# Patient Record
Sex: MALE | Race: BLACK OR AFRICAN AMERICAN | NOT HISPANIC OR LATINO | URBAN - METROPOLITAN AREA
[De-identification: names, ages, dates, MRNs, and addresses within clinical notes are randomized per-mention and may not be internally consistent; named-entity substitution may affect disease eponyms.]

---

## 2021-05-20 ENCOUNTER — INPATIENT (INPATIENT)
Facility: HOSPITAL | Age: 76
LOS: 15 days | Discharge: NURSING, REHAB OR LONG-TERM CARE | DRG: 656 | End: 2021-06-05
Attending: GENERAL ACUTE CARE HOSPITAL | Admitting: GENERAL ACUTE CARE HOSPITAL
Payer: MEDICARE

## 2021-05-20 VITALS
TEMPERATURE: 98 F | RESPIRATION RATE: 16 BRPM | DIASTOLIC BLOOD PRESSURE: 81 MMHG | WEIGHT: 154.1 LBS | HEART RATE: 71 BPM | SYSTOLIC BLOOD PRESSURE: 135 MMHG | OXYGEN SATURATION: 100 % | HEIGHT: 67 IN

## 2021-05-20 DIAGNOSIS — N17.9 ACUTE KIDNEY FAILURE, UNSPECIFIED: ICD-10-CM

## 2021-05-20 DIAGNOSIS — H26.9 UNSPECIFIED CATARACT: Chronic | ICD-10-CM

## 2021-05-20 DIAGNOSIS — Z29.9 ENCOUNTER FOR PROPHYLACTIC MEASURES, UNSPECIFIED: ICD-10-CM

## 2021-05-20 DIAGNOSIS — C16.9 MALIGNANT NEOPLASM OF STOMACH, UNSPECIFIED: ICD-10-CM

## 2021-05-20 DIAGNOSIS — I10 ESSENTIAL (PRIMARY) HYPERTENSION: ICD-10-CM

## 2021-05-20 LAB
ALBUMIN SERPL ELPH-MCNC: 3.8 G/DL — SIGNIFICANT CHANGE UP (ref 3.3–5)
ALP SERPL-CCNC: 94 U/L — SIGNIFICANT CHANGE UP (ref 40–120)
ALT FLD-CCNC: 44 U/L — SIGNIFICANT CHANGE UP (ref 10–45)
ANION GAP SERPL CALC-SCNC: 13 MMOL/L — SIGNIFICANT CHANGE UP (ref 5–17)
ANION GAP SERPL CALC-SCNC: 14 MMOL/L — SIGNIFICANT CHANGE UP (ref 5–17)
APPEARANCE UR: CLEAR — SIGNIFICANT CHANGE UP
AST SERPL-CCNC: 49 U/L — HIGH (ref 10–40)
BASOPHILS # BLD AUTO: 0.03 K/UL — SIGNIFICANT CHANGE UP (ref 0–0.2)
BASOPHILS NFR BLD AUTO: 0.4 % — SIGNIFICANT CHANGE UP (ref 0–2)
BILIRUB SERPL-MCNC: 0.3 MG/DL — SIGNIFICANT CHANGE UP (ref 0.2–1.2)
BILIRUB UR-MCNC: NEGATIVE — SIGNIFICANT CHANGE UP
BUN SERPL-MCNC: 45 MG/DL — HIGH (ref 7–23)
BUN SERPL-MCNC: 46 MG/DL — HIGH (ref 7–23)
CALCIUM SERPL-MCNC: 10.3 MG/DL — SIGNIFICANT CHANGE UP (ref 8.4–10.5)
CALCIUM SERPL-MCNC: 10.4 MG/DL — SIGNIFICANT CHANGE UP (ref 8.4–10.5)
CHLORIDE SERPL-SCNC: 112 MMOL/L — HIGH (ref 96–108)
CHLORIDE SERPL-SCNC: 113 MMOL/L — HIGH (ref 96–108)
CO2 SERPL-SCNC: 22 MMOL/L — SIGNIFICANT CHANGE UP (ref 22–31)
CO2 SERPL-SCNC: 22 MMOL/L — SIGNIFICANT CHANGE UP (ref 22–31)
COLOR SPEC: SIGNIFICANT CHANGE UP
CREAT SERPL-MCNC: 4.02 MG/DL — HIGH (ref 0.5–1.3)
CREAT SERPL-MCNC: 4.08 MG/DL — HIGH (ref 0.5–1.3)
DIFF PNL FLD: ABNORMAL
EOSINOPHIL # BLD AUTO: 0.27 K/UL — SIGNIFICANT CHANGE UP (ref 0–0.5)
EOSINOPHIL NFR BLD AUTO: 3.4 % — SIGNIFICANT CHANGE UP (ref 0–6)
GAS PNL BLDV: SIGNIFICANT CHANGE UP
GLUCOSE SERPL-MCNC: 81 MG/DL — SIGNIFICANT CHANGE UP (ref 70–99)
GLUCOSE SERPL-MCNC: 93 MG/DL — SIGNIFICANT CHANGE UP (ref 70–99)
GLUCOSE UR QL: NEGATIVE — SIGNIFICANT CHANGE UP
HCT VFR BLD CALC: 39.7 % — SIGNIFICANT CHANGE UP (ref 39–50)
HGB BLD-MCNC: 12.6 G/DL — LOW (ref 13–17)
IMM GRANULOCYTES NFR BLD AUTO: 0.3 % — SIGNIFICANT CHANGE UP (ref 0–1.5)
KETONES UR-MCNC: NEGATIVE — SIGNIFICANT CHANGE UP
LEUKOCYTE ESTERASE UR-ACNC: NEGATIVE — SIGNIFICANT CHANGE UP
LYMPHOCYTES # BLD AUTO: 1.78 K/UL — SIGNIFICANT CHANGE UP (ref 1–3.3)
LYMPHOCYTES # BLD AUTO: 22.7 % — SIGNIFICANT CHANGE UP (ref 13–44)
MCHC RBC-ENTMCNC: 28.2 PG — SIGNIFICANT CHANGE UP (ref 27–34)
MCHC RBC-ENTMCNC: 31.7 GM/DL — LOW (ref 32–36)
MCV RBC AUTO: 88.8 FL — SIGNIFICANT CHANGE UP (ref 80–100)
MONOCYTES # BLD AUTO: 1 K/UL — HIGH (ref 0–0.9)
MONOCYTES NFR BLD AUTO: 12.7 % — SIGNIFICANT CHANGE UP (ref 2–14)
NEUTROPHILS # BLD AUTO: 4.75 K/UL — SIGNIFICANT CHANGE UP (ref 1.8–7.4)
NEUTROPHILS NFR BLD AUTO: 60.5 % — SIGNIFICANT CHANGE UP (ref 43–77)
NITRITE UR-MCNC: NEGATIVE — SIGNIFICANT CHANGE UP
NRBC # BLD: 0 /100 WBCS — SIGNIFICANT CHANGE UP (ref 0–0)
PH UR: 6 — SIGNIFICANT CHANGE UP (ref 5–8)
PLATELET # BLD AUTO: 202 K/UL — SIGNIFICANT CHANGE UP (ref 150–400)
POTASSIUM SERPL-MCNC: 4.9 MMOL/L — SIGNIFICANT CHANGE UP (ref 3.5–5.3)
POTASSIUM SERPL-MCNC: 5.7 MMOL/L — HIGH (ref 3.5–5.3)
POTASSIUM SERPL-SCNC: 4.9 MMOL/L — SIGNIFICANT CHANGE UP (ref 3.5–5.3)
POTASSIUM SERPL-SCNC: 5.7 MMOL/L — HIGH (ref 3.5–5.3)
PROT SERPL-MCNC: 7.4 G/DL — SIGNIFICANT CHANGE UP (ref 6–8.3)
PROT UR-MCNC: ABNORMAL
RBC # BLD: 4.47 M/UL — SIGNIFICANT CHANGE UP (ref 4.2–5.8)
RBC # FLD: 15.8 % — HIGH (ref 10.3–14.5)
SARS-COV-2 RNA SPEC QL NAA+PROBE: SIGNIFICANT CHANGE UP
SODIUM SERPL-SCNC: 147 MMOL/L — HIGH (ref 135–145)
SODIUM SERPL-SCNC: 149 MMOL/L — HIGH (ref 135–145)
SP GR SPEC: 1.02 — SIGNIFICANT CHANGE UP (ref 1.01–1.02)
UROBILINOGEN FLD QL: NEGATIVE — SIGNIFICANT CHANGE UP
WBC # BLD: 7.85 K/UL — SIGNIFICANT CHANGE UP (ref 3.8–10.5)
WBC # FLD AUTO: 7.85 K/UL — SIGNIFICANT CHANGE UP (ref 3.8–10.5)

## 2021-05-20 PROCEDURE — 99223 1ST HOSP IP/OBS HIGH 75: CPT

## 2021-05-20 PROCEDURE — 93010 ELECTROCARDIOGRAM REPORT: CPT | Mod: GC

## 2021-05-20 PROCEDURE — 99285 EMERGENCY DEPT VISIT HI MDM: CPT | Mod: CS,GC

## 2021-05-20 PROCEDURE — 71045 X-RAY EXAM CHEST 1 VIEW: CPT | Mod: 26

## 2021-05-20 RX ORDER — SODIUM CHLORIDE 9 MG/ML
1000 INJECTION, SOLUTION INTRAVENOUS
Refills: 0 | Status: DISCONTINUED | OUTPATIENT
Start: 2021-05-20 | End: 2021-05-24

## 2021-05-20 RX ORDER — ONDANSETRON 8 MG/1
4 TABLET, FILM COATED ORAL EVERY 6 HOURS
Refills: 0 | Status: DISCONTINUED | OUTPATIENT
Start: 2021-05-20 | End: 2021-05-24

## 2021-05-20 RX ORDER — ONDANSETRON 8 MG/1
4 TABLET, FILM COATED ORAL ONCE
Refills: 0 | Status: COMPLETED | OUTPATIENT
Start: 2021-05-20 | End: 2021-05-20

## 2021-05-20 RX ORDER — SODIUM CHLORIDE 9 MG/ML
1000 INJECTION INTRAMUSCULAR; INTRAVENOUS; SUBCUTANEOUS ONCE
Refills: 0 | Status: COMPLETED | OUTPATIENT
Start: 2021-05-20 | End: 2021-05-20

## 2021-05-20 RX ORDER — SODIUM CHLORIDE 9 MG/ML
1000 INJECTION INTRAMUSCULAR; INTRAVENOUS; SUBCUTANEOUS
Refills: 0 | Status: DISCONTINUED | OUTPATIENT
Start: 2021-05-20 | End: 2021-05-20

## 2021-05-20 RX ORDER — SENNA PLUS 8.6 MG/1
2 TABLET ORAL AT BEDTIME
Refills: 0 | Status: DISCONTINUED | OUTPATIENT
Start: 2021-05-20 | End: 2021-05-28

## 2021-05-20 RX ORDER — ACETAMINOPHEN 500 MG
975 TABLET ORAL EVERY 8 HOURS
Refills: 0 | Status: DISCONTINUED | OUTPATIENT
Start: 2021-05-20 | End: 2021-05-28

## 2021-05-20 RX ORDER — POLYETHYLENE GLYCOL 3350 17 G/17G
17 POWDER, FOR SOLUTION ORAL DAILY
Refills: 0 | Status: DISCONTINUED | OUTPATIENT
Start: 2021-05-21 | End: 2021-05-28

## 2021-05-20 RX ADMIN — ONDANSETRON 4 MILLIGRAM(S): 8 TABLET, FILM COATED ORAL at 18:37

## 2021-05-20 RX ADMIN — SODIUM CHLORIDE 2000 MILLILITER(S): 9 INJECTION INTRAMUSCULAR; INTRAVENOUS; SUBCUTANEOUS at 17:42

## 2021-05-20 RX ADMIN — SODIUM CHLORIDE 125 MILLILITER(S): 9 INJECTION, SOLUTION INTRAVENOUS at 22:54

## 2021-05-20 RX ADMIN — SENNA PLUS 2 TABLET(S): 8.6 TABLET ORAL at 22:54

## 2021-05-20 NOTE — H&P ADULT - NSHPSOCIALHISTORY_GEN_ALL_CORE
Former smoker (in college), no etoh  , lives w/ wife   ambulates w/o assistance  works as laboratory technologist

## 2021-05-20 NOTE — ED CLERICAL - NS ED CLERK NOTE PRE-ARRIVAL INFORMATION; ADDITIONAL PRE-ARRIVAL INFORMATION
CC/Reason For referral:  stage IV gastric ca in akf  Preferred Consultant(if applicable):  dr joshua nephrologist  Who admits for you (if needed):  Do you have documents you would like to fax over?  Would you still like to speak to an ED attending? please call dr sosa @ [phone] after patient is seen

## 2021-05-20 NOTE — H&P ADULT - ASSESSMENT
75 yr old male w/ recent diagnosis of stage IV gastric carcinoma sent from [facility] with noted outpt abnormal labs concerning for new onset acute renal failure.

## 2021-05-20 NOTE — CONSULT NOTE ADULT - SUBJECTIVE AND OBJECTIVE BOX
Patient with Stage IV gastric cancer seen for evaluation by Dr. Kendall Arellano at [facility] found to have GIOVANNI referred to ED for admission, evaluation.    To be admitted under Dr. Gene Mullins. Dr. Noel from Nephrology called about case - requested to consult.    Full consult to follow in am.    Thank you.  Josr Nunes PA-C  Hematology/Oncology  [facility]   [phone] (cell)  [phone] (office)  [phone] (alt office)  Evenings and weekends please call MD on call or office

## 2021-05-20 NOTE — ED PROVIDER NOTE - CLINICAL SUMMARY MEDICAL DECISION MAKING FREE TEXT BOX
Acute renal failure in pt with stage 4 gastric cancer.  will get CBC, CMP today and give IVF.  will place thibodeaux if in renal failure.  check EKG and admit pt for further evaluation.

## 2021-05-20 NOTE — ED ADULT NURSE NOTE - OBJECTIVE STATEMENT
Pt was sent here from [facility] for eval and ts of elev Na+ and Cl+.  He received ivf earlier this week for dehydration. and an increase in his gfr.  Currently has c/o mild nausea and discomfort.  He reports decreased appetite, recently dx'd stage IV gastric ca. but has nor received any chemo as yet.

## 2021-05-20 NOTE — H&P ADULT - HISTORY OF PRESENT ILLNESS
75 yr old male w/ recent diagnosis of stage IV gastric carcinoma sent from [facility] with abnormal labs, notably elevated sCr. Pt notes normal renal function about two weeks ago. Notes he was also recently in ED earlier this week where he was really dehdrated and received some IVF (?500 cc NS) and then sent home. Notes he had decreased urine output that day but has otherwise not noted a decrease in urine output at home. No blood in urine. Denies new medications including NSAIDS or antibiotics. Has had reduced oral intake in the last 3 weeks associated with lack of appetite. Currently w/ mild nausea and discomfort.  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 Plan to start chemo.

## 2021-05-20 NOTE — ED PROVIDER NOTE - CONSTITUTIONAL, MLM
RN contacted by CT tech.  Informed Pt refusing CT.  RN to CT to talk with Pt.    Well appearing, awake, alert, oriented to person, place, time/situation and in no apparent distress. normal...

## 2021-05-20 NOTE — ED PROVIDER NOTE - OBJECTIVE STATEMENT
Attendinyo male presents with elevated creatinine.  Pt's GFR went from over 100 to less than 25 over the course of 1-2 weeks.  pt states he has been having decreased PO intake.  Has diagnosis of stage 4 gastric cancer, not on chemotherapy.  pt sent to the ED for the elevated creatinine for admission to Dr. Mullins.  Pt has no pain.  no nausea but feels fatigued.

## 2021-05-20 NOTE — H&P ADULT - NSICDXPASTMEDICALHX_GEN_ALL_CORE_FT
PAST MEDICAL HISTORY:  Gastric cancer stage IV    HTN (hypertension)     Polycystic kidney disease ??    Raynauds syndrome

## 2021-05-20 NOTE — ED ADULT NURSE REASSESSMENT NOTE - NS ED NURSE REASSESS COMMENT FT1
Indwelling urinary "thibodeaux" catheter inserted using sterile technique. Procedure, risks, and benefits of catheter explained to patient, patient verbalized understanding. Second RN present to confirm sterility. Pt tolerated well. Urinary catheter drained clear urine, no clots visualized. Bedside drainage to gravity. Stat lock in place.

## 2021-05-20 NOTE — H&P ADULT - NSHPLABSRESULTS_GEN_ALL_CORE
Personally reviewed labs.   Personally reviewed imaging.   Personally reviewed EKG.                12.6   7.85  )-----------( 202      ( 20 May 2021 17:52 )             39.7     149<H>  |  113<H>  |  45<H>  ----------------------------<  81  4.9   |  22  |  4.08<H>    Ca    10.3      20 May 2021 19:08    TPro  7.4  /  Alb  3.8  /  TBili  0.3  /  DBili  x   /  AST  49<H>  /  ALT  44  /  AlkPhos  94  05-20    LIVER FUNCTIONS - ( 20 May 2021 17:52 )  Alb: 3.8 g/dL / Pro: 7.4 g/dL / ALK PHOS: 94 U/L / ALT: 44 U/L / AST: 49 U/L / GGT: x           Urinalysis Basic - ( 20 May 2021 19:48 )    Color: Light Yellow / Appearance: Clear / S.017 / pH: x  Gluc: x / Ketone: Negative  / Bili: Negative / Urobili: Negative   Blood: x / Protein: 30 mg/dL / Nitrite: Negative   Leuk Esterase: Negative / RBC: 1 /hpf / WBC 1 /HPF   Sq Epi: x / Non Sq Epi: 0 /hpf / Bacteria: Negative    EKG- NSR w.o acute ST or T changes  CXR w/ clear lungs  UA: protein 30 mg/dl; trace blood

## 2021-05-20 NOTE — H&P ADULT - PROBLEM SELECTOR PLAN 1
may have component of dehydration/pre renal but unclear cause of rapid decline; pt denies any new medications; oliguric   - obtain urine lytes  - s/p morelia peoples, strict In/Out q3h  - will start IVF: LR at 125 cc/hr  - avoid nephrotoxic agetns may have component of dehydration/pre renal but unclear cause of rapid decline; pt denies any new medications; oliguric   - obtain urine lytes and Renal US  - s/p morelia peoples, strict In/Out q3h  - will start IVF: LR at 125 cc/hr  - avoid nephrotoxic agents  - Nephro consult

## 2021-05-20 NOTE — H&P ADULT - NSHPREVIEWOFSYSTEMS_GEN_ALL_CORE
REVIEW OF SYSTEMS:  CONSTITUTIONAL: + poor appetite. No weakness. No fevers. No chills. No rigors. No weight loss. No night sweats.  EYES: No blurry or double vision. No eye pain.  ENT: No hearing difficulty. No vertigo. No dysphagia. No sore throat. No Sinusitis/rhinorrhea.   NECK: No pain. No stiffness/rigidity.  CARDIAC: No chest pain. No palpitations. No lightheadedness. No syncope.  RESPIRATORY: No cough. No SOB. No hemoptysis.  GASTROINTESTINAL: +abdominal discomfort, nausea. No vomiting. No hematemesis. No diarrhea. No constipation. No melena. No hematochezia.  GENITOURINARY: No dysuria. No frequency. No hesitancy. No hematuria. No oliguria.  NEUROLOGICAL: No numbness/tingling. No focal weakness. No urinary or fecal incontinence. No headache. No unsteady gait.  BACK: + back pain. No flank pain.  EXTREMITIES: No lower extremity edema. Full ROM. No joint pain.  SKIN: No rashes. No itching. No other lesions.  PSYCHIATRIC: No depression. No anxiety. No SI/HI.  ALLERGIC: No lip swelling. No hives.  All other review of systems is negative unless indicated above.  Unless indicated above, unable to assess ROS 2/2

## 2021-05-20 NOTE — H&P ADULT - NSHPPHYSICALEXAM_GEN_ALL_CORE
PHYSICAL EXAM:   GENERAL: Alert. Not confused. No acute distress. Not thin. Not cachectic. Not obese.  HEAD:  Atraumatic. Normocephalic.  EYES: EOMI. PERRLA. Normal conjunctiva/sclera.  ENT: Neck supple. No JVD. Moist oral mucosa. Not edentulous. No thrush.  LYMPH: Normal supraclavicular/cervical lymph nodes.   CARDIAC: No tachy, Not kemi. Regular rhythm. Not irregularly irregular. S1. S2. No murmur. No rub. No distant heart sounds.  LUNG/CHEST: CTAB. BS equal bilaterally. No wheezes. No rales. No rhonchi.  ABDOMEN: Soft. mild tenderness to palpation in hypogastric region. No distension. No fluid wave. Normal bowel sounds.  BACK: No midline/vertebral tenderness. No flank tenderness.  VASCULAR: +2 b/l radial or ulnar pulses. Palpable DP pulses.  EXTREMITIES:  No clubbing. No cyanosis. No edema. Moving all 4.  NEUROLOGY: A&Ox3. Non-focal exam. Cranial nerves intact. Normal speech. Sensation intact.  PSYCH: Normal behavior. Normal affect.  SKIN: No jaundice. No erythema. No rash/lesion.  Vascular Access:     ICU Vital Signs Last 24 Hrs  T(C): 36.8 (20 May 2021 15:53), Max: 36.8 (20 May 2021 15:53)  T(F): 98.3 (20 May 2021 15:53), Max: 98.3 (20 May 2021 15:53)  HR: 72 (20 May 2021 15:53) (71 - 72)  BP: 133/71 (20 May 2021 15:53) (133/71 - 135/81)  BP(mean): --  ABP: --  ABP(mean): --  RR: 18 (20 May 2021 15:53) (16 - 18)  SpO2: 100% (20 May 2021 15:53) (100% - 100%)      I&O's Summary    20 May 2021 07:01  -  20 May 2021 21:45  --------------------------------------------------------  IN: 1000 mL / OUT: 100 mL / NET: 900 mL

## 2021-05-20 NOTE — H&P ADULT - PROBLEM SELECTOR PLAN 2
Recent diagnosis, per pt, known metastasis to peritoneal lymph nodes  Heme onc following, appreciate recs  pain control- tylenol 975 mg TID for mild-mod pain, will add dilaudid po for severe pain  nausea - zofran 4 mg q6h prn

## 2021-05-21 LAB
ANION GAP SERPL CALC-SCNC: 16 MMOL/L — SIGNIFICANT CHANGE UP (ref 5–17)
BASOPHILS # BLD AUTO: 0.02 K/UL — SIGNIFICANT CHANGE UP (ref 0–0.2)
BASOPHILS NFR BLD AUTO: 0.3 % — SIGNIFICANT CHANGE UP (ref 0–2)
BUN SERPL-MCNC: 40 MG/DL — HIGH (ref 7–23)
CALCIUM SERPL-MCNC: 9.8 MG/DL — SIGNIFICANT CHANGE UP (ref 8.4–10.5)
CHLORIDE SERPL-SCNC: 115 MMOL/L — HIGH (ref 96–108)
CO2 SERPL-SCNC: 18 MMOL/L — LOW (ref 22–31)
COVID-19 SPIKE DOMAIN AB INTERP: POSITIVE
COVID-19 SPIKE DOMAIN ANTIBODY RESULT: >250 U/ML — HIGH
CREAT ?TM UR-MCNC: 116 MG/DL — SIGNIFICANT CHANGE UP
CREAT SERPL-MCNC: 3.98 MG/DL — HIGH (ref 0.5–1.3)
EOSINOPHIL # BLD AUTO: 0.24 K/UL — SIGNIFICANT CHANGE UP (ref 0–0.5)
EOSINOPHIL NFR BLD AUTO: 3.8 % — SIGNIFICANT CHANGE UP (ref 0–6)
GLUCOSE SERPL-MCNC: 78 MG/DL — SIGNIFICANT CHANGE UP (ref 70–99)
HCT VFR BLD CALC: 36.6 % — LOW (ref 39–50)
HGB BLD-MCNC: 11.6 G/DL — LOW (ref 13–17)
IMM GRANULOCYTES NFR BLD AUTO: 0.2 % — SIGNIFICANT CHANGE UP (ref 0–1.5)
LACTATE SERPL-SCNC: 1.1 MMOL/L — SIGNIFICANT CHANGE UP (ref 0.7–2)
LYMPHOCYTES # BLD AUTO: 1.29 K/UL — SIGNIFICANT CHANGE UP (ref 1–3.3)
LYMPHOCYTES # BLD AUTO: 20.2 % — SIGNIFICANT CHANGE UP (ref 13–44)
MCHC RBC-ENTMCNC: 27.8 PG — SIGNIFICANT CHANGE UP (ref 27–34)
MCHC RBC-ENTMCNC: 31.7 GM/DL — LOW (ref 32–36)
MCV RBC AUTO: 87.6 FL — SIGNIFICANT CHANGE UP (ref 80–100)
MONOCYTES # BLD AUTO: 0.7 K/UL — SIGNIFICANT CHANGE UP (ref 0–0.9)
MONOCYTES NFR BLD AUTO: 11 % — SIGNIFICANT CHANGE UP (ref 2–14)
NEUTROPHILS # BLD AUTO: 4.12 K/UL — SIGNIFICANT CHANGE UP (ref 1.8–7.4)
NEUTROPHILS NFR BLD AUTO: 64.5 % — SIGNIFICANT CHANGE UP (ref 43–77)
NRBC # BLD: 0 /100 WBCS — SIGNIFICANT CHANGE UP (ref 0–0)
PHOSPHATE SERPL-MCNC: 3.2 MG/DL — SIGNIFICANT CHANGE UP (ref 2.5–4.5)
PLATELET # BLD AUTO: 165 K/UL — SIGNIFICANT CHANGE UP (ref 150–400)
POTASSIUM SERPL-MCNC: 4.9 MMOL/L — SIGNIFICANT CHANGE UP (ref 3.5–5.3)
POTASSIUM SERPL-SCNC: 4.9 MMOL/L — SIGNIFICANT CHANGE UP (ref 3.5–5.3)
PROT ?TM UR-MCNC: 14 MG/DL — HIGH (ref 0–12)
PROT/CREAT UR-RTO: 0.1 RATIO — SIGNIFICANT CHANGE UP (ref 0–0.2)
RBC # BLD: 4.18 M/UL — LOW (ref 4.2–5.8)
RBC # FLD: 15.6 % — HIGH (ref 10.3–14.5)
SARS-COV-2 IGG+IGM SERPL QL IA: >250 U/ML — HIGH
SARS-COV-2 IGG+IGM SERPL QL IA: POSITIVE
SODIUM SERPL-SCNC: 149 MMOL/L — HIGH (ref 135–145)
SODIUM UR-SCNC: 70 MMOL/L — SIGNIFICANT CHANGE UP
WBC # BLD: 6.38 K/UL — SIGNIFICANT CHANGE UP (ref 3.8–10.5)
WBC # FLD AUTO: 6.38 K/UL — SIGNIFICANT CHANGE UP (ref 3.8–10.5)

## 2021-05-21 PROCEDURE — 99223 1ST HOSP IP/OBS HIGH 75: CPT | Mod: GC

## 2021-05-21 RX ORDER — HEPARIN SODIUM 5000 [USP'U]/ML
5000 INJECTION INTRAVENOUS; SUBCUTANEOUS EVERY 8 HOURS
Refills: 0 | Status: DISCONTINUED | OUTPATIENT
Start: 2021-05-21 | End: 2021-05-28

## 2021-05-21 RX ORDER — FAMOTIDINE 10 MG/ML
20 INJECTION INTRAVENOUS ONCE
Refills: 0 | Status: COMPLETED | OUTPATIENT
Start: 2021-05-21 | End: 2021-05-21

## 2021-05-21 RX ORDER — SODIUM CHLORIDE 9 MG/ML
1000 INJECTION, SOLUTION INTRAVENOUS ONCE
Refills: 0 | Status: COMPLETED | OUTPATIENT
Start: 2021-05-21 | End: 2021-05-21

## 2021-05-21 RX ADMIN — SODIUM CHLORIDE 125 MILLILITER(S): 9 INJECTION, SOLUTION INTRAVENOUS at 18:06

## 2021-05-21 RX ADMIN — SENNA PLUS 2 TABLET(S): 8.6 TABLET ORAL at 21:35

## 2021-05-21 RX ADMIN — ONDANSETRON 4 MILLIGRAM(S): 8 TABLET, FILM COATED ORAL at 07:24

## 2021-05-21 RX ADMIN — SODIUM CHLORIDE 125 MILLILITER(S): 9 INJECTION, SOLUTION INTRAVENOUS at 11:41

## 2021-05-21 RX ADMIN — SODIUM CHLORIDE 500 MILLILITER(S): 9 INJECTION, SOLUTION INTRAVENOUS at 09:04

## 2021-05-21 RX ADMIN — ONDANSETRON 4 MILLIGRAM(S): 8 TABLET, FILM COATED ORAL at 13:24

## 2021-05-21 RX ADMIN — HEPARIN SODIUM 5000 UNIT(S): 5000 INJECTION INTRAVENOUS; SUBCUTANEOUS at 14:23

## 2021-05-21 RX ADMIN — HEPARIN SODIUM 5000 UNIT(S): 5000 INJECTION INTRAVENOUS; SUBCUTANEOUS at 21:35

## 2021-05-21 RX ADMIN — HEPARIN SODIUM 5000 UNIT(S): 5000 INJECTION INTRAVENOUS; SUBCUTANEOUS at 05:48

## 2021-05-21 RX ADMIN — FAMOTIDINE 20 MILLIGRAM(S): 10 INJECTION INTRAVENOUS at 11:24

## 2021-05-21 NOTE — CONSULT NOTE ADULT - SUBJECTIVE AND OBJECTIVE BOX
[facility] DIVISION OF KIDNEY DISEASES AND HYPERTENSION -- INITIAL CONSULT NOTE  --------------------------------------------------------------------------------  Trent Rosales   Nephrology Fellow  Pager NS: [phone]/ [facility]: [phone]  (After 5 pm or on weekends please page the on-call fellow, can view the schedule on [employer]. Login is kita lr, schedule under [facility] medicine, psych, derm.    HPI: Patient is a 75 yr old male w/ recent diagnosis of stage IV gastric carcinoma sent from [facility] with abnormal labs, notably elevated sCr. Pt notes normal renal function about two weeks ago. Notes he was also recently in ED earlier this week where he was really dehdrated and received some IVF (?500 cc NS) and then sent home. Notes he had decreased urine output that day but has otherwise not noted a decrease in urine output at home. No blood in urine. Denies new medications including NSAIDS or antibiotics. Has had reduced oral intake in the last 3 weeks associated with lack of appetite. Currently w/ mild nausea and discomfort.  Diagnosed last week with stage IV gastric ca w/ metastasis to peritoneal lymph nodes. Nephrology consulted for GIOVANNI management.         PAST HISTORY  --------------------------------------------------------------------------------  PAST MEDICAL & SURGICAL HISTORY:  HTN (hypertension)    Polycystic kidney disease  ??    Raynauds syndrome    Gastric cancer  stage IV    Cataract      FAMILY HISTORY:  FH: liver cancer (Mother)      PAST SOCIAL HISTORY:    ALLERGIES & MEDICATIONS  --------------------------------------------------------------------------------  Allergies    aspirin (Rash)    Intolerances      Standing Inpatient Medications  heparin   Injectable 5000 Unit(s) SubCutaneous every 8 hours  lactated ringers. 1000 milliLiter(s) IV Continuous <Continuous>  polyethylene glycol 3350 17 Gram(s) Oral daily  senna 2 Tablet(s) Oral at bedtime    PRN Inpatient Medications  acetaminophen   Tablet .. 975 milliGRAM(s) Oral every 8 hours PRN  ondansetron Injectable 4 milliGRAM(s) IV Push every 6 hours PRN      REVIEW OF SYSTEMS  --------------------------------------------------------------------------------  Gen: No fevers/chills  Skin: No rashes  Head/Eyes/Ears: Normal hearing, no difficulty seeing  Respiratory: No dyspnea, cough  CV: No chest pain  GI: No abdominal pain, diarrhea  : No dysuria, hematuria  MSK: No  edema  Heme: No easy bruising or bleeding  Psych: No significant depression.    VITALS/PHYSICAL EXAM  --------------------------------------------------------------------------------  T(C): 36.8 (05-21-21 @ 04:56), Max: 37.2 (05-20-21 @ 23:29)  HR: 70 (05-21-21 @ 04:56) (70 - 80)  BP: 144/78 (05-21-21 @ 04:56) (131/73 - 149/71)  RR: 18 (05-21-21 @ 04:56) (16 - 18)  SpO2: 100% (05-21-21 @ 04:56) (100% - 100%)  Wt(kg): --  Height (cm): 170.2 (05-20-21 @ 14:19)  Weight (kg): 69.9 (05-20-21 @ 14:19)  BMI (kg/m2): 24.1 (05-20-21 @ 14:19)  BSA (m2): 1.81 (05-20-21 @ 14:19)      05-20-21 @ 07:01  -  05-21-21 @ 06:53  --------------------------------------------------------  IN: 1000 mL / OUT: 400 mL / NET: 600 mL      Physical Exam:    	Gen: NAD  	HEENT: Anicteric  	Pulm: CTA B/L  	CV: S1S2  	Abd: Soft, +BS   	MSK: No LE edema B/L  	Neuro: Awake  	Skin: Warm and dry  	Vascular access:      LABS/STUDIES  --------------------------------------------------------------------------------              12.6   7.85  >-----------<  202      [05-20-21 @ 17:52]              39.7     149  |  113  |  45  ----------------------------<  81      [05-20-21 @ 19:08]  4.9   |  22  |  4.08        Ca     10.3     [05-20-21 @ 19:08]    TPro  7.4  /  Alb  3.8  /  TBili  0.3  /  DBili  x   /  AST  49  /  ALT  44  /  AlkPhos  94  [05-20-21 @ 17:52]          Creatinine Trend:  SCr 4.08 [05-20 @ 19:08]  SCr 4.02 [05-20 @ 17:52]    Urinalysis - [05-20-21 @ 19:48]      Color Light Yellow / Appearance Clear / SG 1.017 / pH 6.0      Gluc Negative / Ketone Negative  / Bili Negative / Urobili Negative       Blood Trace / Protein 30 mg/dL / Leuk Est Negative / Nitrite Negative      RBC 1 / WBC 1 / Hyaline 2 / Gran  / Sq Epi  / Non Sq Epi 0 / Bacteria Negative           [facility] DIVISION OF KIDNEY DISEASES AND HYPERTENSION -- INITIAL CONSULT NOTE  --------------------------------------------------------------------------------  Trent Rosales   Nephrology Fellow  Pager NS: [phone]/ [facility]: [phone]  (After 5 pm or on weekends please page the on-call fellow, can view the schedule on [employer]. Login is kita lr, schedule under [facility] medicine, psych, derm.    HPI: Patient is a 75 yr old male w/ recent diagnosis of stage IV gastric carcinoma sent from [facility] with abnormal labs, notably elevated sCr. Pt notes normal renal function about two weeks ago. Notes he was also recently in ED earlier this week where he was really dehdrated and received some IVF (?500 cc NS) and then sent home. Notes he had decreased urine output that day but has otherwise not noted a decrease in urine output at home. No blood in urine. Denies new medications including NSAIDS or antibiotics. Has had reduced oral intake in the last 3 weeks associated with lack of appetite. Currently w/ mild nausea and discomfort.  Diagnosed last week with stage IV gastric ca w/ metastasis to peritoneal lymph nodes. Nephrology consulted for GIOVANNI management. Pt states that he was on several anti-hypertensive agents at home, including telmisartan, he discontinued them on his own roughly 2 weeks ago bc his BP was low.         PAST HISTORY  --------------------------------------------------------------------------------  PAST MEDICAL & SURGICAL HISTORY:  HTN (hypertension)    Polycystic kidney disease  ??    Raynauds syndrome    Gastric cancer  stage IV    Cataract      FAMILY HISTORY:  FH: liver cancer (Mother)      PAST SOCIAL HISTORY: No toxic habits    ALLERGIES & MEDICATIONS  --------------------------------------------------------------------------------  Allergies    aspirin (Rash)    Intolerances      Standing Inpatient Medications  heparin   Injectable 5000 Unit(s) SubCutaneous every 8 hours  lactated ringers. 1000 milliLiter(s) IV Continuous <Continuous>  polyethylene glycol 3350 17 Gram(s) Oral daily  senna 2 Tablet(s) Oral at bedtime    PRN Inpatient Medications  acetaminophen   Tablet .. 975 milliGRAM(s) Oral every 8 hours PRN  ondansetron Injectable 4 milliGRAM(s) IV Push every 6 hours PRN      REVIEW OF SYSTEMS  --------------------------------------------------------------------------------  Gen: No fevers/chills  Skin: No rashes  Head/Eyes/Ears: Normal hearing, no difficulty seeing  Respiratory: No dyspnea, cough  CV: No chest pain  GI: No abdominal pain, diarrhea  : No dysuria, hematuria  MSK: No  edema  Heme: No easy bruising or bleeding  Psych: No significant depression.    VITALS/PHYSICAL EXAM  --------------------------------------------------------------------------------  T(C): 36.8 (05-21-21 @ 04:56), Max: 37.2 (05-20-21 @ 23:29)  HR: 70 (05-21-21 @ 04:56) (70 - 80)  BP: 144/78 (05-21-21 @ 04:56) (131/73 - 149/71)  RR: 18 (05-21-21 @ 04:56) (16 - 18)  SpO2: 100% (05-21-21 @ 04:56) (100% - 100%)  Wt(kg): --  Height (cm): 170.2 (05-20-21 @ 14:19)  Weight (kg): 69.9 (05-20-21 @ 14:19)  BMI (kg/m2): 24.1 (05-20-21 @ 14:19)  BSA (m2): 1.81 (05-20-21 @ 14:19)      05-20-21 @ 07:01  -  05-21-21 @ 06:53  --------------------------------------------------------  IN: 1000 mL / OUT: 400 mL / NET: 600 mL      Physical Exam:    	Gen: NAD  	HEENT: Anicteric  	Pulm: CTA B/L  	CV: S1S2  	Abd: Soft, +BS               : Ramon with trace urine  	MSK: No LE edema B/L  	Neuro: Awake  	Skin: Warm and dry  	Vascular access:      LABS/STUDIES  --------------------------------------------------------------------------------              12.6   7.85  >-----------<  202      [05-20-21 @ 17:52]              39.7     149  |  113  |  45  ----------------------------<  81      [05-20-21 @ 19:08]  4.9   |  22  |  4.08        Ca     10.3     [05-20-21 @ 19:08]    TPro  7.4  /  Alb  3.8  /  TBili  0.3  /  DBili  x   /  AST  49  /  ALT  44  /  AlkPhos  94  [05-20-21 @ 17:52]          Creatinine Trend:  SCr 4.08 [05-20 @ 19:08]  SCr 4.02 [05-20 @ 17:52]    Urinalysis - [05-20-21 @ 19:48]      Color Light Yellow / Appearance Clear / SG 1.017 / pH 6.0      Gluc Negative / Ketone Negative  / Bili Negative / Urobili Negative       Blood Trace / Protein 30 mg/dL / Leuk Est Negative / Nitrite Negative      RBC 1 / WBC 1 / Hyaline 2 / Gran  / Sq Epi  / Non Sq Epi 0 / Bacteria Negative

## 2021-05-21 NOTE — CONSULT NOTE ADULT - ATTENDING COMMENTS
Alert, conversant  1.  ARF--likely volume depletion possibly magnified by ARB.  Ulytes not distinctly pre-renal but would provide IVF and trend off diuretics and ARB  2.  HYpernatremia--free water supplementation.  May be attributable to 3.  3.  Hypercalcemia--PTH, 1/25 VitD levels.  Likely improve with volume optimization and provision of NaCl to distal tubule    discussed with med team

## 2021-05-21 NOTE — CONSULT NOTE ADULT - SUBJECTIVE AND OBJECTIVE BOX
Reason for consult: Gastric Cancer    HPI:  75 yr old male w/ recent diagnosis of stage IV gastric carcinoma [place] [facility] [place], notably elevated sCr. Pt notes normal renal function about two weeks ago. Notes he was also recently in ED earlier this week where he was really dehdrated and received some IVF (?500 cc NS) and then sent home. Notes he had decreased urine output that day but has otherwise not noted a decrease in urine output at home. No blood in urine. Denies new medications including NSAIDS or antibiotics. Has had reduced oral intake in the last 3 weeks associated with lack of appetite. Currently w/ mild nausea and discomfort.  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 Plan to start chemo.    (20 May 2021 21:37)    Hematology/Oncology called to see patient who was seen in consultation by Dr. Kendall Arellano and [facility] on 5/20/2021 for stage IV gastric cancer. History provided by Dr. Arellano as follows:  75-year-old male with HTN was experiencing abdominal pain, fatigue and weight loss of 10 pounds, with symptoms worsening over 6 week period  Patient saw PMD and was referred to gastroenterology 4/15/2021 CT A/P w/o con outside report – mesenteric, retroperitoneal, pelvic adenopathy concerning for malignancy, innumerable hyper and hypodense cysts throughout kidneys demonstrating peripheral calcifications, compatible with polycystic kidney disease. Multiple lytic and sclerotic lesions throughout sacrum and to lesser extent pelvis concerning for metastasis.  Prostatomegaly. Hypoattenuating lesions in liver may represent cysts, however metastases cannot be entirely excluded  5/5/2021 PET/CT [facility] outside report - linear segment of hypermetabolism associated with lesser curvature of mid stomach measures 4 x 1cm, SUV 6.0; numerous hypo and  hyperdense cysts throughout bilateral kidneys, multiple hypodense lesions in liver consistent with hepatic cysts; FDG avid posterior mediastinal lymph node mass adjacent to mid esophagus, SUV 5.0, 2.1 x 1.9cm. Numerous FDG avid lymph nodes throughout abdomen and pelvis for example right external iliac node, SUV 5.1, 1.7 x 1.4cm. Inactive left thyroid nodule, lack of uptake favors adenoma. No aggressive osseous lesions seen.  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 Avelina Chaudhry of [facility] who informed patient of stage IV gastric cancer diagnosis, palliative intent of treatment and recommended DOF + Herceptin (docetaxel 70mg/m2 d1, oxaliplatin 130mg/m2, IVCI fluorouracil 750mg/m2 d1-5, trastuzumab 8mg/kg loading dose then 6 mg/kg maintenance every 3 weeks as part of 21 day schedule)  As of day of appointment, patient had not signed release of information to request pathology and imaging [place] at [facility], [facility] review not yet available  Patient reports he feels well, is urinating less. Patient notes that he had eGFR >100 last week but within a few days had dropped significantly with no cause known.    PAST MEDICAL & SURGICAL HISTORY:  HTN (hypertension)    Polycystic kidney disease  ??    Raynauds syndrome    Gastric cancer  stage IV    Cataract        FAMILY HISTORY:  FH: liver cancer (Mother)        Alcohol Denied  Smoking: Nonsmoker  Drug Use: Denied  Marital Status:         Allergies    aspirin (Rash)    Intolerances        MEDICATIONS  (STANDING):  famotidine    Tablet 20 milliGRAM(s) Oral once  heparin   Injectable 5000 Unit(s) SubCutaneous every 8 hours  lactated ringers. 1000 milliLiter(s) (125 mL/Hr) IV Continuous <Continuous>  polyethylene glycol 3350 17 Gram(s) Oral daily  senna 2 Tablet(s) Oral at bedtime    MEDICATIONS  (PRN):  acetaminophen   Tablet .. 975 milliGRAM(s) Oral every 8 hours PRN Mild Pain (1 - 3), Moderate Pain (4 - 6)  ondansetron Injectable 4 milliGRAM(s) IV Push every 6 hours PRN Nausea      ROS  No fever, sweats, chills  No epistaxis, HA, sore throat  No CP, SOB, cough, sputum  Intermittent nausea, no vomiting  No edema  No rash  No anxiety  No back pain, joint pain  No bleeding, bruising  No dysuria, hematuria    T(C): 36.6 (05-21-21 @ 11:18), Max: 37.2 (05-20-21 @ 23:29)  HR: 61 (05-21-21 @ 11:18) (61 - 80)  BP: 149/75 (05-21-21 @ 11:18) (131/73 - 164/80)  RR: 18 (05-21-21 @ 11:18) (16 - 18)  SpO2: 99% (05-21-21 @ 11:18) (99% - 100%)  Wt(kg): --    PE  NAD  Awake, alert  Anicteric, MMM  RRR  CTAB  Abd soft, NT, ND  No c/c/e  No rash grossly                            11.6   6.38  )-----------( 165      ( 21 May 2021 07:10 )             36.6       05-21    149<H>  |  115<H>  |  40<H>  ----------------------------<  78  4.9   |  18<L>  |  3.98<H>    Ca    9.8      21 May 2021 07:10  Phos  3.2     05-21    TPro  7.4  /  Alb  3.8  /  TBili  0.3  /  DBili  x   /  AST  49<H>  /  ALT  44  /  AlkPhos  94  05-20    EXAM:  XR CHEST PORTABLE URGENT 1V                            PROCEDURE DATE:  05/20/2021            INTERPRETATION:  EXAMINATION: XR CHEST URGENT    CLINICAL INDICATION: weakness    TECHNIQUE: Single frontal, portable view of the chest was obtained.    COMPARISON:  None.    FINDINGS:    The heart is normal in size.  The lungs are clear.  There is no pneumothorax or pleural effusion.  Degenerative changes of the spine.    IMPRESSION:  Clear lungs.

## 2021-05-21 NOTE — PROGRESS NOTE ADULT - ASSESSMENT
75 yr old male w/ recent diagnosis of stage IV gastric [place] [facility] [place] abnormal labs concerning for new onset acute renal failure.     Problem/Plan - 1:  ·  Problem: Acute kidney failure.  Plan: may have component of dehydration/pre renal but unclear cause of rapid decline; pt denies any new medications; oliguric   - fu  Renal US  - s/p morelia peoples, strict In/Out q3h  - cont ivf   - avoid nephrotoxic agents  - appreciate nephro input , possible need for bx     Problem/Plan - 2:  ·  Problem: Gastric cancer.  Plan: Recent diagnosis, per pt, known metastasis to peritoneal lymph nodes  Heme onc following, appreciate recs  pain control- tylenol 975 mg TID for mild-mod pain, will add dilaudid po for severe pain  nausea - zofran 4 mg q6h prn.     Problem/Plan - 3:  ·  Problem: HTN (hypertension).  Plan: not on antihypertensives per pt  controlled, continue to monitor.     Problem/Plan - 4:  ·  Problem: Prophylactic measure.  Plan: DVT ppx: heparin subq.

## 2021-05-22 LAB
ANION GAP SERPL CALC-SCNC: 13 MMOL/L — SIGNIFICANT CHANGE UP (ref 5–17)
BUN SERPL-MCNC: 42 MG/DL — HIGH (ref 7–23)
CALCIUM SERPL-MCNC: 9.8 MG/DL — SIGNIFICANT CHANGE UP (ref 8.4–10.5)
CHLORIDE SERPL-SCNC: 114 MMOL/L — HIGH (ref 96–108)
CO2 SERPL-SCNC: 20 MMOL/L — LOW (ref 22–31)
CREAT SERPL-MCNC: 3.94 MG/DL — HIGH (ref 0.5–1.3)
GLUCOSE SERPL-MCNC: 80 MG/DL — SIGNIFICANT CHANGE UP (ref 70–99)
HCT VFR BLD CALC: 38.9 % — LOW (ref 39–50)
HGB BLD-MCNC: 12.7 G/DL — LOW (ref 13–17)
MCHC RBC-ENTMCNC: 28.5 PG — SIGNIFICANT CHANGE UP (ref 27–34)
MCHC RBC-ENTMCNC: 32.6 GM/DL — SIGNIFICANT CHANGE UP (ref 32–36)
MCV RBC AUTO: 87.4 FL — SIGNIFICANT CHANGE UP (ref 80–100)
NRBC # BLD: 0 /100 WBCS — SIGNIFICANT CHANGE UP (ref 0–0)
PLATELET # BLD AUTO: 177 K/UL — SIGNIFICANT CHANGE UP (ref 150–400)
POTASSIUM SERPL-MCNC: 5.2 MMOL/L — SIGNIFICANT CHANGE UP (ref 3.5–5.3)
POTASSIUM SERPL-SCNC: 5.2 MMOL/L — SIGNIFICANT CHANGE UP (ref 3.5–5.3)
RBC # BLD: 4.45 M/UL — SIGNIFICANT CHANGE UP (ref 4.2–5.8)
RBC # FLD: 15.5 % — HIGH (ref 10.3–14.5)
SODIUM SERPL-SCNC: 147 MMOL/L — HIGH (ref 135–145)
WBC # BLD: 5.66 K/UL — SIGNIFICANT CHANGE UP (ref 3.8–10.5)
WBC # FLD AUTO: 5.66 K/UL — SIGNIFICANT CHANGE UP (ref 3.8–10.5)

## 2021-05-22 PROCEDURE — 99233 SBSQ HOSP IP/OBS HIGH 50: CPT | Mod: GC

## 2021-05-22 PROCEDURE — 76770 US EXAM ABDO BACK WALL COMP: CPT | Mod: 26

## 2021-05-22 RX ORDER — LATANOPROST 0.05 MG/ML
1 SOLUTION/ DROPS OPHTHALMIC; TOPICAL AT BEDTIME
Refills: 0 | Status: DISCONTINUED | OUTPATIENT
Start: 2021-05-22 | End: 2021-05-28

## 2021-05-22 RX ADMIN — LATANOPROST 1 DROP(S): 0.05 SOLUTION/ DROPS OPHTHALMIC; TOPICAL at 21:22

## 2021-05-22 RX ADMIN — SENNA PLUS 2 TABLET(S): 8.6 TABLET ORAL at 21:22

## 2021-05-22 RX ADMIN — HEPARIN SODIUM 5000 UNIT(S): 5000 INJECTION INTRAVENOUS; SUBCUTANEOUS at 06:30

## 2021-05-22 RX ADMIN — HEPARIN SODIUM 5000 UNIT(S): 5000 INJECTION INTRAVENOUS; SUBCUTANEOUS at 21:22

## 2021-05-22 RX ADMIN — HEPARIN SODIUM 5000 UNIT(S): 5000 INJECTION INTRAVENOUS; SUBCUTANEOUS at 14:28

## 2021-05-22 NOTE — PROGRESS NOTE ADULT - SUBJECTIVE AND OBJECTIVE BOX
Patient is a 75y old  Male who presents with a chief complaint of GIOVANNI (21 May 2021 15:17)      MEDICATIONS  (STANDING):  heparin   Injectable 5000 Unit(s) SubCutaneous every 8 hours  lactated ringers. 1000 milliLiter(s) (125 mL/Hr) IV Continuous <Continuous>  polyethylene glycol 3350 17 Gram(s) Oral daily  senna 2 Tablet(s) Oral at bedtime    MEDICATIONS  (PRN):  acetaminophen   Tablet .. 975 milliGRAM(s) Oral every 8 hours PRN Mild Pain (1 - 3), Moderate Pain (4 - 6)  ondansetron Injectable 4 milliGRAM(s) IV Push every 6 hours PRN Nausea      Interim History:  No acute events over night. Vitals stable. Patient in no acute distress      Vital Signs Last 24 Hrs  T(C): 36.6 (22 May 2021 04:01), Max: 36.8 (21 May 2021 21:23)  T(F): 97.9 (22 May 2021 04:01), Max: 98.2 (21 May 2021 21:23)  HR: 72 (22 May 2021 04:01) (72 - 72)  BP: 151/76 (22 May 2021 04:01) (151/76 - 159/83)  BP(mean): --  RR: 18 (22 May 2021 04:01) (17 - 18)  SpO2: 99% (22 May 2021 04:01) (99% - 100%)    PE  NAD  Awake, alert  Anicteric, MMM  RRR  CTAB  Abd soft, NT, ND  No c/c/e  No rash grossly                            12.7   5.66  )-----------( 177      ( 22 May 2021 06:58 )             38.9       05-22    147<H>  |  114<H>  |  42<H>  ----------------------------<  80  5.2   |  20<L>  |  3.94<H>    Ca    9.8      22 May 2021 06:52  Phos  3.2     05-21    TPro  7.4  /  Alb  3.8  /  TBili  0.3  /  DBili  x   /  AST  49<H>  /  ALT  44  /  AlkPhos  94  05-20

## 2021-05-22 NOTE — PROGRESS NOTE ADULT - SUBJECTIVE AND OBJECTIVE BOX
[facility] Division of Kidney Diseases & Hypertension  FOLLOW UP NOTE  --------------------------------------------------------------------------------  Chief Complaint:    24 hour events/subjective:    no changes         PAST HISTORY  --------------------------------------------------------------------------------  No significant changes to PMH, PSH, FHx, SHx, unless otherwise noted    ALLERGIES & MEDICATIONS  --------------------------------------------------------------------------------  Allergies    aspirin (Rash)    Intolerances      Standing Inpatient Medications  heparin   Injectable 5000 Unit(s) SubCutaneous every 8 hours  lactated ringers. 1000 milliLiter(s) IV Continuous <Continuous>  polyethylene glycol 3350 17 Gram(s) Oral daily  senna 2 Tablet(s) Oral at bedtime    PRN Inpatient Medications  acetaminophen   Tablet .. 975 milliGRAM(s) Oral every 8 hours PRN  ondansetron Injectable 4 milliGRAM(s) IV Push every 6 hours PRN        VITALS/PHYSICAL EXAM  --------------------------------------------------------------------------------  T(C): 36.6 (05-22-21 @ 04:01), Max: 36.8 (05-21-21 @ 21:23)  HR: 72 (05-22-21 @ 04:01) (72 - 72)  BP: 151/76 (05-22-21 @ 04:01) (151/76 - 159/83)  RR: 18 (05-22-21 @ 04:01) (17 - 18)  SpO2: 99% (05-22-21 @ 04:01) (99% - 100%)  Wt(kg): --  Height (cm): 170.2 (05-20-21 @ 14:19)  Weight (kg): 69.9 (05-20-21 @ 14:19)  BMI (kg/m2): 24.1 (05-20-21 @ 14:19)  BSA (m2): 1.81 (05-20-21 @ 14:19)      05-21-21 @ 07:01  -  05-22-21 @ 07:00  --------------------------------------------------------  IN: 350 mL / OUT: 650 mL / NET: -300 mL      Physical Exam:  	Gen: well-appearing  	HEENT: supple neck  	Pulm: CTA B/L  	CV: RRR, S1S2; no rub  	Abd: +BS, soft, nontender/nondistended  	UE: Warm, no edema; no asterixis  	Skin: Warm, without rashes  	  LABS/STUDIES  --------------------------------------------------------------------------------              12.7   5.66  >-----------<  177      [05-22-21 @ 06:58]              38.9     147  |  114  |  42  ----------------------------<  80      [05-22-21 @ 06:52]  5.2   |  20  |  3.94        Ca     9.8     [05-22-21 @ 06:52]      Phos  3.2     [05-21-21 @ 07:10]    TPro  7.4  /  Alb  3.8  /  TBili  0.3  /  DBili  x   /  AST  49  /  ALT  44  /  AlkPhos  94  [05-20-21 @ 17:52]          Creatinine Trend:  SCr 3.94 [05-22 @ 06:52]  SCr 3.98 [05-21 @ 07:10]  SCr 4.08 [05-20 @ 19:08]  SCr 4.02 [05-20 @ 17:52]    Urinalysis - [05-20-21 @ 19:48]      Color Light Yellow / Appearance Clear / SG 1.017 / pH 6.0      Gluc Negative / Ketone Negative  / Bili Negative / Urobili Negative       Blood Trace / Protein 30 mg/dL / Leuk Est Negative / Nitrite Negative      RBC 1 / WBC 1 / Hyaline 2 / Gran  / Sq Epi  / Non Sq Epi 0 / Bacteria Negative    Urine Creatinine 116      [05-21-21 @ 06:41]  Urine Protein 14      [05-21-21 @ 06:41]  Urine Sodium 70      [05-21-21 @ 06:41]

## 2021-05-22 NOTE — PROGRESS NOTE ADULT - SUBJECTIVE AND OBJECTIVE BOX
Date of service: 05-22-21 @ 21:58      Patient is a 75y old  Male who presents with a chief complaint of GIOVANNI (22 May 2021 11:54)                                                               INTERVAL HPI/OVERNIGHT EVENTS:    REVIEW OF SYSTEMS:     CONSTITUTIONAL: No weakness, fevers or chills  EYES/ENT: No visual changes , no ear ache   NECK: No pain or stiffness  RESPIRATORY: No cough, wheezing,  No shortness of breath  CARDIOVASCULAR: No chest pain or palpitations  GASTROINTESTINAL: No abdominal pain  . No nausea, vomiting, or hematemesis; No diarrhea or constipation. No melena or hematochezia.  GENITOURINARY: No dysuria, frequency or hematuria  NEUROLOGICAL: No numbness or weakness  SKIN: No itching, burning, rashes, or lesions                                                                                                                                                                                                                                                                                 Medications:  MEDICATIONS  (STANDING):  heparin   Injectable 5000 Unit(s) SubCutaneous every 8 hours  lactated ringers. 1000 milliLiter(s) (125 mL/Hr) IV Continuous <Continuous>  latanoprost 0.005% Ophthalmic Solution 1 Drop(s) Both EYES at bedtime  polyethylene glycol 3350 17 Gram(s) Oral daily  senna 2 Tablet(s) Oral at bedtime    MEDICATIONS  (PRN):  acetaminophen   Tablet .. 975 milliGRAM(s) Oral every 8 hours PRN Mild Pain (1 - 3), Moderate Pain (4 - 6)  ondansetron Injectable 4 milliGRAM(s) IV Push every 6 hours PRN Nausea       Allergies    aspirin (Rash)    Intolerances      Vital Signs Last 24 Hrs  T(C): 36.7 (22 May 2021 21:52), Max: 36.7 (22 May 2021 12:54)  T(F): 98.1 (22 May 2021 21:52), Max: 98.1 (22 May 2021 12:54)  HR: 63 (22 May 2021 21:52) (62 - 72)  BP: 148/83 (22 May 2021 21:52) (124/75 - 151/76)  BP(mean): --  RR: 18 (22 May 2021 21:52) (18 - 18)  SpO2: 100% (22 May 2021 21:52) (99% - 100%)  CAPILLARY BLOOD GLUCOSE          05-21 @ 07:01  -  05-22 @ 07:00  --------------------------------------------------------  IN: 350 mL / OUT: 650 mL / NET: -300 mL      Physical Exam:    Daily     Daily   General:  Well appearing, NAD, not cachetic  HEENT:  Nonicteric, PERRLA  CV:  RRR, S1S2   Lungs:  CTA B/L, no wheezes, rales, rhonchi  Abdomen:  Soft, non-tender, no distended, positive BS  Extremities:  2+ pulses, no c/c, no edema  Skin:  Warm and dry, no rashes  :  No thibodeaux  Neuro:  AAOx3, non-focal, grossly intact                                                                                                                                                                                                                                                                                                LABS:                               12.7   5.66  )-----------( 177      ( 22 May 2021 06:58 )             38.9                      05-22    147<H>  |  114<H>  |  42<H>  ----------------------------<  80  5.2   |  20<L>  |  3.94<H>    Ca    9.8      22 May 2021 06:52  Phos  3.2     05-21                         RADIOLOGY & ADDITIONAL TESTS         I personally reviewed: [  ]EKG   [  ]CXR    [  ] CT      A/P:         Discussed with :     [place] consultants' Notes   Time spent :

## 2021-05-22 NOTE — PROGRESS NOTE ADULT - ASSESSMENT
75y M with gastric cancer presenting with acute renal failure    #GIOVANNI  - Pt reportedly had normal renal fxn a week ago, states his Cr was ~1mg/dl, now presenting with Cr of 4mg/dl in setting of poor Po intake and ? ARB use. Possibly now with ATN  - UA relatively bland, no significant proteinuria  - Agree with IVF, CONTINUE LR at 125cc/hr > he has now begun to urinate     Hypernatremia  - Na 147 and improving.

## 2021-05-22 NOTE — PROGRESS NOTE ADULT - ASSESSMENT
75 yr old male w/ recent diagnosis of stage [place] [facility] [place] abnormal labs concerning for new onset acute renal failure.     Problem/Plan - 1:  ·  Problem: Acute kidney failure.  Plan: may have component of dehydration/pre renal but unclear cause of rapid decline; pt denies any new medications; oliguric   - fu  Renal US  - s/p morelia peoples, strict In/Out q3h  - cont ivf   - avoid nephrotoxic agents  - appreciate nephro input , possible need for bx     Problem/Plan - 2:  ·  Problem: Gastric cancer.  Plan: Recent diagnosis, per pt, known metastasis to peritoneal lymph nodes  Heme onc following, appreciate recs  pain control- tylenol 975 mg TID for mild-mod pain, will add dilaudid po for severe pain  nausea - zofran 4 mg q6h prn.     Problem/Plan - 3:  ·  Problem: HTN (hypertension).  Plan: not on antihypertensives per pt  controlled, continue to monitor.     Problem/Plan - 4:  ·  Problem: Prophylactic measure.  Plan: DVT ppx: heparin subq.

## 2021-05-22 NOTE — PROGRESS NOTE ADULT - ASSESSMENT
75 yr old male w/ recent diagnosis of stage IV gastric carcinoma sent from [facility] [place] with abnormal labs, notably elevated sCr. Pt notes normal renal function about two weeks ago. Notes he was also recently in ED earlier this week where he was really dehdrated and received some IVF (?500 cc NS) and then sent home. Notes he had decreased urine output that day but has otherwise not noted a decrease in urine output at home. No blood in urine. Denies new medications including NSAIDS or antibiotics. Has had reduced oral intake in the last 3 weeks associated with lack of appetite. Currently w/ mild nausea and discomfort.  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 Plan to start chemo.    (20 May 2021 21:37)    Hematology/Oncology called to see patient who was seen in consultation by Dr. Kendall Arellano and [facility] [place] on 5/20/2021 for stage IV gastric cancer. History provided by Dr. Arellano as follows:  75-year-old male with HTN was experiencing abdominal pain, fatigue and weight loss of 10 pounds, with symptoms worsening over 6 week period  Patient saw PMD and was referred to gastroenterology 4/15/2021 CT A/P w/o con outside report – mesenteric, retroperitoneal, pelvic adenopathy concerning for malignancy, innumerable hyper and hypodense cysts throughout kidneys demonstrating peripheral calcifications, compatible with polycystic kidney disease. Multiple lytic and sclerotic lesions throughout sacrum and to lesser extent pelvis concerning for metastasis.  Prostatomegaly. Hypoattenuating lesions in liver may represent cysts, however metastases cannot be entirely excluded  5/5/2021 PET/CT [facility] outside report - linear segment of hypermetabolism associated with lesser curvature of mid stomach measures 4 x 1cm, SUV 6.0; numerous hypo and  hyperdense cysts throughout bilateral kidneys, multiple hypodense lesions in liver consistent with hepatic cysts; FDG avid posterior mediastinal lymph node mass adjacent to mid esophagus, SUV 5.0, 2.1 x 1.9cm. Numerous FDG avid lymph nodes throughout abdomen and pelvis for example right external iliac node, SUV 5.1, 1.7 x 1.4cm. Inactive left thyroid nodule, lack of uptake favors adenoma. No aggressive osseous lesions seen.  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 Avelina Chaudhry of [facility] who informed patient of stage IV gastric cancer diagnosis, palliative intent of treatment and recommended DOF + Herceptin (docetaxel 70mg/m2 d1, oxaliplatin 130mg/m2, IVCI fluorouracil 750mg/m2 d1-5, trastuzumab 8mg/kg loading dose then 6 mg/kg maintenance every 3 weeks as part of 21 day schedule)  As of day of appointment, patient had not signed release of information to request pathology and imaging review at [facility] [place], [facility] [place] review not yet available  Patient reports he feels well, is urinating less. Patient notes that he had eGFR >100 last week but within a few days had dropped significantly with no cause known.      Stage IV Gastric Cancer  --Seen by Dr. Kendall Arellano at [facility]  --Treatment per Dr. Arellano      Acute Kidney Injury  --Nephrology Consult appreciated; recommended IVF and USG to assess for hydronephrosis  --Workup underway  --Cr more or less stable; 3.9      We will continue to follow patient and coordinate with [facility]      Altaf Panda MD  Hematology/Oncology Consultant  [facility]  Cell: [phone]

## 2021-05-23 LAB
ANION GAP SERPL CALC-SCNC: 12 MMOL/L — SIGNIFICANT CHANGE UP (ref 5–17)
BUN SERPL-MCNC: 39 MG/DL — HIGH (ref 7–23)
CALCIUM SERPL-MCNC: 9.4 MG/DL — SIGNIFICANT CHANGE UP (ref 8.4–10.5)
CHLORIDE SERPL-SCNC: 114 MMOL/L — HIGH (ref 96–108)
CO2 SERPL-SCNC: 19 MMOL/L — LOW (ref 22–31)
CREAT SERPL-MCNC: 3.81 MG/DL — HIGH (ref 0.5–1.3)
EOSINOPHIL NFR URNS MANUAL: POSITIVE
GLUCOSE SERPL-MCNC: 72 MG/DL — SIGNIFICANT CHANGE UP (ref 70–99)
POTASSIUM SERPL-MCNC: 4.4 MMOL/L — SIGNIFICANT CHANGE UP (ref 3.5–5.3)
POTASSIUM SERPL-SCNC: 4.4 MMOL/L — SIGNIFICANT CHANGE UP (ref 3.5–5.3)
SODIUM SERPL-SCNC: 145 MMOL/L — SIGNIFICANT CHANGE UP (ref 135–145)

## 2021-05-23 RX ADMIN — HEPARIN SODIUM 5000 UNIT(S): 5000 INJECTION INTRAVENOUS; SUBCUTANEOUS at 05:34

## 2021-05-23 RX ADMIN — POLYETHYLENE GLYCOL 3350 17 GRAM(S): 17 POWDER, FOR SOLUTION ORAL at 11:32

## 2021-05-23 RX ADMIN — SENNA PLUS 2 TABLET(S): 8.6 TABLET ORAL at 21:02

## 2021-05-23 RX ADMIN — Medication 975 MILLIGRAM(S): at 02:25

## 2021-05-23 RX ADMIN — Medication 975 MILLIGRAM(S): at 11:15

## 2021-05-23 RX ADMIN — HEPARIN SODIUM 5000 UNIT(S): 5000 INJECTION INTRAVENOUS; SUBCUTANEOUS at 21:02

## 2021-05-23 RX ADMIN — LATANOPROST 1 DROP(S): 0.05 SOLUTION/ DROPS OPHTHALMIC; TOPICAL at 21:02

## 2021-05-23 RX ADMIN — Medication 975 MILLIGRAM(S): at 10:39

## 2021-05-23 RX ADMIN — ONDANSETRON 4 MILLIGRAM(S): 8 TABLET, FILM COATED ORAL at 10:39

## 2021-05-23 RX ADMIN — Medication 975 MILLIGRAM(S): at 02:18

## 2021-05-23 RX ADMIN — HEPARIN SODIUM 5000 UNIT(S): 5000 INJECTION INTRAVENOUS; SUBCUTANEOUS at 13:07

## 2021-05-23 NOTE — PROGRESS NOTE ADULT - ASSESSMENT
75 yr old male w/ recent diagnosis of stage IV gastric carcinoma sent from [facility] [place] with abnormal labs, notably elevated sCr. Pt notes normal renal function about two weeks ago. Notes he was also recently in ED earlier this week where he was really dehdrated and received some IVF (?500 cc NS) and then sent home. Notes he had decreased urine output that day but has otherwise not noted a decrease in urine output at home. No blood in urine. Denies new medications including NSAIDS or antibiotics. Has had reduced oral intake in the last 3 weeks associated with lack of appetite. Currently w/ mild nausea and discomfort.  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 Plan to start chemo.    (20 May 2021 21:37)    Hematology/Oncology called to see patient who was seen in consultation by Dr. Kendall Arellano [place] [facility] [place] for stage IV gastric cancer. History provided by Dr. Arellano as follows:  75-year-old male with HTN was experiencing abdominal pain, fatigue and weight loss of 10 pounds, with symptoms worsening over 6 week period  Patient saw PMD and was referred to gastroenterology 4/15/2021 CT A/P w/o con outside report – mesenteric, retroperitoneal, pelvic adenopathy concerning for malignancy, innumerable hyper and hypodense cysts throughout kidneys demonstrating peripheral calcifications, compatible with polycystic kidney disease. Multiple lytic and sclerotic lesions throughout sacrum and to lesser extent pelvis concerning for metastasis.  Prostatomegaly. Hypoattenuating lesions in liver may represent cysts, however metastases cannot be entirely excluded  5/5/2021 PET/CT [facility] outside report - linear segment of hypermetabolism associated with lesser curvature of mid stomach measures 4 x 1cm, SUV 6.0; numerous hypo and  hyperdense cysts throughout bilateral kidneys, multiple hypodense lesions in liver consistent with hepatic cysts; FDG avid posterior mediastinal lymph node mass adjacent to mid esophagus, SUV 5.0, 2.1 x 1.9cm. Numerous FDG avid lymph nodes throughout abdomen and pelvis for example right external iliac node, SUV 5.1, 1.7 x 1.4cm. Inactive left thyroid nodule, lack of uptake favors adenoma. No aggressive osseous lesions seen.  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 Avelina Chaudhry of [facility] who informed patient of stage IV gastric cancer diagnosis, palliative intent of treatment and recommended DOF + Herceptin (docetaxel 70mg/m2 d1, oxaliplatin 130mg/m2, IVCI fluorouracil 750mg/m2 d1-5, trastuzumab 8mg/kg loading dose then 6 mg/kg maintenance every 3 weeks as part of 21 day schedule)  As of day of appointment, patient had not signed release of information to request pathology and imaging review at [facility], [facility] review not yet available  Patient reports he feels well, is urinating less. Patient notes that he had eGFR >100 last week but within a few days had dropped significantly with no cause known.      Stage IV Gastric Cancer  --[place] Dr. Kendall Arellano [place] [facility] [place]  --Treatment per Dr. Arellano      Acute Kidney Injury  --Nephrology Consult appreciated; recommended IVF and USG to assess for hydronephrosis  --Workup underway  --Cr more or less stable; 3.81  --ATN vs. ARB induced vs. hydronephrosis;  noted US to have increased renal cortical echogenicity bilaterally which may be secondary to medical renal disease; mild to moderate left hydronephrosis with echogenic material noted within the left renal collecting system.   -- nephrology f/u  We will be happy to answer any onc questions on behalf of [facility] and will be in touch with them.     Jens Alonzo MD  Hematology/Oncology  Cell:  [phone]  Office Phone: [phone]  Office Fax:  [phone] [address] [place] [address]

## 2021-05-23 NOTE — PROGRESS NOTE ADULT - ASSESSMENT
75 yr old male w/ recent diagnosis of stage IV gastric carcinoma sent from [facility] with noted outpt abnormal labs concerning for new onset acute renal failure.     Problem/Plan - 1:  ·  Problem: Acute kidney failure.  Plan: may have component of dehydration/pre renal but unclear cause of rapid decline; pt denies any new medications  - Renal US: noted .. consider CT Nn contrast ..? underlying pckd   - thibodeaux   - cont ivf   - avoid nephrotoxic agents  - possible need for bx .. Cr still elevated .. no obvious etiology .. eosinophils sent    fu with nephro       Problem/Plan - 2:  ·  Problem: Gastric cancer.  Plan: Recent diagnosis, per pt, known metastasis to peritoneal lymph nodes  Heme onc following, appreciate recs  pain control- tylenol 975 mg TID for mild-mod pain, will add dilaudid po for severe pain  nausea - zofran 4 mg q6h prn.     Problem/Plan - 3:  ·  Problem: HTN (hypertension).  Plan: not on antihypertensives per pt  controlled, continue to monitor.     Problem/Plan - 4:  ·  Problem: back pain : sec to suptured ? cyst

## 2021-05-23 NOTE — PROGRESS NOTE ADULT - SUBJECTIVE AND OBJECTIVE BOX
no new events    acetaminophen   Tablet .. 975 milliGRAM(s) Oral every 8 hours PRN  heparin   Injectable 5000 Unit(s) SubCutaneous every 8 hours  lactated ringers. 1000 milliLiter(s) IV Continuous <Continuous>  latanoprost 0.005% Ophthalmic Solution 1 Drop(s) Both EYES at bedtime  ondansetron Injectable 4 milliGRAM(s) IV Push every 6 hours PRN  polyethylene glycol 3350 17 Gram(s) Oral daily  senna 2 Tablet(s) Oral at bedtime      aspirin (Rash)      ROS otherwise negative     T(C): 36.7 (05-23-21 @ 05:47), Max: 36.7 (05-22-21 @ 12:54)  HR: 58 (05-23-21 @ 05:47) (58 - 63)  BP: 152/80 (05-23-21 @ 05:47) (124/75 - 152/80)  RR: 18 (05-23-21 @ 05:47) (18 - 18)  SpO2: 99% (05-23-21 @ 05:47) (99% - 100%)  PHYSICAL EXAM  Gen:  laying in bed, nad  H:  anicteric, eomi  CV:  RRR, S1, S2  Lungs:  CTA b/l  Ab soft/nt/nd  Ext:  no edema                          12.7   5.66  )-----------( 177      ( 22 May 2021 06:58 )             38.9                         11.6   6.38  )-----------( 165      ( 21 May 2021 07:10 )             36.6                         12.6   7.85  )-----------( 202      ( 20 May 2021 17:52 )             39.7   05-23    145  |  114<H>  |  39<H>  ----------------------------<  72  4.4   |  19<L>  |  3.81<H>    Ca    9.4      23 May 2021 07:30    < from: US Kidney and Bladder (05.22.21 @ 17:34) >  Increased renal cortical echogenicity bilaterally which may be secondary to medical renal disease.    Multiple bilateral renal cysts, some of which are complex. In particular, a cyst in the left kidney demonstrates peripheral calcification and internal echogenic material of uncertain etiology.    Mild to moderate left hydronephrosis with echogenic material noted within the left renal collecting system. If the patient's creatinine normalizes, dedicated pre and postcontrast CT or MR is suggested for further evaluation.    Nonobstructing right intrarenal calculi.    < end of copied text >

## 2021-05-23 NOTE — PROGRESS NOTE ADULT - SUBJECTIVE AND OBJECTIVE BOX
Date of service: 05-23-21 @ 22:42      Patient is a 75y old  Male who presents with a chief complaint of GIOVANNI (23 May 2021 10:54)                                                               INTERVAL HPI/OVERNIGHT EVENTS:    REVIEW OF SYSTEMS:     CONSTITUTIONAL: No weakness, fevers or chills  EYES/ENT: No visual changes , no ear ache   NECK: No pain or stiffness  RESPIRATORY: No cough, wheezing,  No shortness of breath  CARDIOVASCULAR: No chest pain or palpitations  GASTROINTESTINAL: No abdominal pain  . No nausea, vomiting, or hematemesis; No diarrhea or constipation. No melena or hematochezia.  GENITOURINARY: No dysuria, frequency or hematuria  NEUROLOGICAL: No numbness or weakness  SKIN: No itching, burning, rashes, or lesions                                                                                                                                                                                                                                                                                 Medications:  MEDICATIONS  (STANDING):  heparin   Injectable 5000 Unit(s) SubCutaneous every 8 hours  lactated ringers. 1000 milliLiter(s) (125 mL/Hr) IV Continuous <Continuous>  latanoprost 0.005% Ophthalmic Solution 1 Drop(s) Both EYES at bedtime  polyethylene glycol 3350 17 Gram(s) Oral daily  senna 2 Tablet(s) Oral at bedtime    MEDICATIONS  (PRN):  acetaminophen   Tablet .. 975 milliGRAM(s) Oral every 8 hours PRN Mild Pain (1 - 3), Moderate Pain (4 - 6)  ondansetron Injectable 4 milliGRAM(s) IV Push every 6 hours PRN Nausea       Allergies    aspirin (Rash)    Intolerances      Vital Signs Last 24 Hrs  T(C): 37.2 (23 May 2021 20:55), Max: 37.2 (23 May 2021 20:55)  T(F): 98.9 (23 May 2021 20:55), Max: 98.9 (23 May 2021 20:55)  HR: 68 (23 May 2021 20:55) (58 - 68)  BP: 123/70 (23 May 2021 20:55) (123/70 - 152/80)  BP(mean): --  RR: 18 (23 May 2021 20:55) (18 - 18)  SpO2: 97% (23 May 2021 20:55) (97% - 99%)  CAPILLARY BLOOD GLUCOSE          05-22 @ 07:01  -  05-23 @ 07:00  --------------------------------------------------------  IN: 1700 mL / OUT: 700 mL / NET: 1000 mL    05-23 @ 07:01  -  05-23 @ 22:42  --------------------------------------------------------  IN: 240 mL / OUT: 725 mL / NET: -485 mL      Physical Exam:    Daily     Daily   General:  Well appearing, NAD, not cachetic  HEENT:  Nonicteric, PERRLA  CV:  RRR, S1S2   Lungs:  CTA B/L, no wheezes, rales, rhonchi  Abdomen:  Soft, non-tender, no distended, positive BS  Extremities:  2+ pulses, no c/c, no edema  Skin:  Warm and dry, no rashes  :  No thibodeaux  Neuro:  AAOx3, non-focal, grossly intact                                                                                                                                                                                                                                                                                                LABS:                               12.7   5.66  )-----------( 177      ( 22 May 2021 06:58 )             38.9                      05-23    145  |  114<H>  |  39<H>  ----------------------------<  72  4.4   |  19<L>  |  3.81<H>    Ca    9.4      23 May 2021 07:30                         RADIOLOGY & ADDITIONAL TESTS         I personally reviewed: [  ]EKG   [  ]CXR    [  ] CT      A/P:         Discussed with :     [facility] consultants' Notes   Time spent :

## 2021-05-24 LAB
ANION GAP SERPL CALC-SCNC: 13 MMOL/L — SIGNIFICANT CHANGE UP (ref 5–17)
BUN SERPL-MCNC: 33 MG/DL — HIGH (ref 7–23)
CALCIUM SERPL-MCNC: 9.3 MG/DL — SIGNIFICANT CHANGE UP (ref 8.4–10.5)
CHLORIDE SERPL-SCNC: 112 MMOL/L — HIGH (ref 96–108)
CO2 SERPL-SCNC: 19 MMOL/L — LOW (ref 22–31)
CREAT SERPL-MCNC: 3.65 MG/DL — HIGH (ref 0.5–1.3)
GLUCOSE SERPL-MCNC: 74 MG/DL — SIGNIFICANT CHANGE UP (ref 70–99)
HCT VFR BLD CALC: 35.2 % — LOW (ref 39–50)
HGB BLD-MCNC: 11.7 G/DL — LOW (ref 13–17)
MCHC RBC-ENTMCNC: 28.7 PG — SIGNIFICANT CHANGE UP (ref 27–34)
MCHC RBC-ENTMCNC: 33.2 GM/DL — SIGNIFICANT CHANGE UP (ref 32–36)
MCV RBC AUTO: 86.5 FL — SIGNIFICANT CHANGE UP (ref 80–100)
NRBC # BLD: 0 /100 WBCS — SIGNIFICANT CHANGE UP (ref 0–0)
PLATELET # BLD AUTO: 181 K/UL — SIGNIFICANT CHANGE UP (ref 150–400)
POTASSIUM SERPL-MCNC: 4.3 MMOL/L — SIGNIFICANT CHANGE UP (ref 3.5–5.3)
POTASSIUM SERPL-SCNC: 4.3 MMOL/L — SIGNIFICANT CHANGE UP (ref 3.5–5.3)
RBC # BLD: 4.07 M/UL — LOW (ref 4.2–5.8)
RBC # FLD: 15.3 % — HIGH (ref 10.3–14.5)
SODIUM SERPL-SCNC: 144 MMOL/L — SIGNIFICANT CHANGE UP (ref 135–145)
WBC # BLD: 7.64 K/UL — SIGNIFICANT CHANGE UP (ref 3.8–10.5)
WBC # FLD AUTO: 7.64 K/UL — SIGNIFICANT CHANGE UP (ref 3.8–10.5)

## 2021-05-24 PROCEDURE — 74176 CT ABD & PELVIS W/O CONTRAST: CPT | Mod: 26

## 2021-05-24 PROCEDURE — 99233 SBSQ HOSP IP/OBS HIGH 50: CPT | Mod: GC

## 2021-05-24 RX ORDER — SIMETHICONE 80 MG/1
80 TABLET, CHEWABLE ORAL ONCE
Refills: 0 | Status: COMPLETED | OUTPATIENT
Start: 2021-05-24 | End: 2021-05-24

## 2021-05-24 RX ORDER — SODIUM CHLORIDE 9 MG/ML
1000 INJECTION, SOLUTION INTRAVENOUS
Refills: 0 | Status: DISCONTINUED | OUTPATIENT
Start: 2021-05-24 | End: 2021-05-28

## 2021-05-24 RX ORDER — METOCLOPRAMIDE HCL 10 MG
5 TABLET ORAL EVERY 8 HOURS
Refills: 0 | Status: DISCONTINUED | OUTPATIENT
Start: 2021-05-24 | End: 2021-05-27

## 2021-05-24 RX ADMIN — ONDANSETRON 4 MILLIGRAM(S): 8 TABLET, FILM COATED ORAL at 11:19

## 2021-05-24 RX ADMIN — Medication 5 MILLIGRAM(S): at 20:20

## 2021-05-24 RX ADMIN — POLYETHYLENE GLYCOL 3350 17 GRAM(S): 17 POWDER, FOR SOLUTION ORAL at 11:18

## 2021-05-24 RX ADMIN — Medication 975 MILLIGRAM(S): at 11:38

## 2021-05-24 RX ADMIN — HEPARIN SODIUM 5000 UNIT(S): 5000 INJECTION INTRAVENOUS; SUBCUTANEOUS at 05:45

## 2021-05-24 RX ADMIN — Medication 975 MILLIGRAM(S): at 12:12

## 2021-05-24 RX ADMIN — SENNA PLUS 2 TABLET(S): 8.6 TABLET ORAL at 22:26

## 2021-05-24 RX ADMIN — LATANOPROST 1 DROP(S): 0.05 SOLUTION/ DROPS OPHTHALMIC; TOPICAL at 22:26

## 2021-05-24 RX ADMIN — SIMETHICONE 80 MILLIGRAM(S): 80 TABLET, CHEWABLE ORAL at 12:16

## 2021-05-24 RX ADMIN — ONDANSETRON 4 MILLIGRAM(S): 8 TABLET, FILM COATED ORAL at 05:47

## 2021-05-24 RX ADMIN — HEPARIN SODIUM 5000 UNIT(S): 5000 INJECTION INTRAVENOUS; SUBCUTANEOUS at 14:30

## 2021-05-24 RX ADMIN — HEPARIN SODIUM 5000 UNIT(S): 5000 INJECTION INTRAVENOUS; SUBCUTANEOUS at 22:26

## 2021-05-24 NOTE — PROGRESS NOTE ADULT - ASSESSMENT
75 yr old male w/ recent diagnosis of stage IV gastric carcinoma [place] [facility] [place] abnormal labs concerning for new onset acute renal failure.     Problem/Plan - 1:  ·  Problem: Acute kidney failure.  Plan: may have component of dehydration/pre renal but unclear cause of rapid decline; pt denies any new medications  - Renal US: noted .. consider CT Nn contrast ..? underlying pckd   - thibodeaux TOV   - avoid nephrotoxic agents  - Cr now slwoly improving .. no obvious etiology .. eosinophils positive  : will dw neph  - discussed with urology : CT done : fu official reports     Problem/Plan - 2:  ·  Problem: Gastric cancer.  Plan: Recent diagnosis, per pt, known metastasis to peritoneal lymph nodes  Heme onc following, appreciate recs  pain control- tylenol 975 mg TID for mild-mod pain, will add dilaudid po for severe pain  nausea - zofran 4 mg q6h prn.     Problem/Plan - 3:  ·  Problem: HTN (hypertension).  Plan: not on antihypertensives per pt  controlled, continue to monitor.     Problem/Plan - 4:  ·  Problem: back pain : sec to suptured ? cyst

## 2021-05-24 NOTE — PROGRESS NOTE ADULT - SUBJECTIVE AND OBJECTIVE BOX
[facility] DIVISION OF KIDNEY DISEASES AND HYPERTENSION -- FOLLOW UP NOTE  --------------------------------------------------------------------------------  Trent Rosales   Nephrology Fellow  Pager NS: [phone]/ [facility]: [phone]  (After 5 pm or on weekends please page the on-call fellow, can view the schedule on [email or web address]. Login is kita lr, schedule under [facility] medicine, psych, derm.      Patient is a 75y old  Male who presents with a chief complaint of GIOVANNI (23 May 2021 22:41)      24 hour events/subjective: Patient seen and examined at the bedside. Vital signs, labs, medications reviewed. Endorsing bloating and nausea. Non-oliguric otherwise         PAST HISTORY  --------------------------------------------------------------------------------  No significant changes to PMH, PSH, FHx, SHx, unless otherwise noted    ALLERGIES & MEDICATIONS  --------------------------------------------------------------------------------  Allergies    aspirin (Rash)    Intolerances      Standing Inpatient Medications  heparin   Injectable 5000 Unit(s) SubCutaneous every 8 hours  lactated ringers. 1000 milliLiter(s) IV Continuous <Continuous>  latanoprost 0.005% Ophthalmic Solution 1 Drop(s) Both EYES at bedtime  polyethylene glycol 3350 17 Gram(s) Oral daily  senna 2 Tablet(s) Oral at bedtime    PRN Inpatient Medications  acetaminophen   Tablet .. 975 milliGRAM(s) Oral every 8 hours PRN  ondansetron Injectable 4 milliGRAM(s) IV Push every 6 hours PRN      REVIEW OF SYSTEMS  --------------------------------------------------------------------------------  Gen: No fevers/chills  Skin: No rashes  Head/Eyes/Ears: Normal hearing, no difficulty seeing  Respiratory: No dyspnea, cough  CV: No chest pain  GI:  Complaining of bloating  : No dysuria, hematuria  MSK: No  edema  Heme: No easy bruising or bleeding  Psych: No significant depression    >>> <<<    VITALS/PHYSICAL EXAM  --------------------------------------------------------------------------------  T(C): 37.1 (05-24-21 @ 05:57), Max: 37.2 (05-23-21 @ 20:55)  HR: 66 (05-24-21 @ 08:44) (59 - 68)  BP: 119/63 (05-24-21 @ 08:44) (119/63 - 162/80)  RR: 18 (05-24-21 @ 05:57) (18 - 18)  SpO2: 98% (05-24-21 @ 05:57) (97% - 98%)  Wt(kg): --        05-23-21 @ 07:01  -  05-24-21 @ 07:00  --------------------------------------------------------  IN: 1990 mL / OUT: 1125 mL / NET: 865 mL      Physical Exam:    	Gen: NAD  	HEENT: Anicteric  	Pulm: CTA B/L  	CV: S1S2  	Abd: Soft, +BS   	MSK: No LE edema B/L  	Neuro: Awake  	Skin: Warm and dry  	Vascular access:      LABS/STUDIES  --------------------------------------------------------------------------------              11.7   7.64  >-----------<  181      [05-24-21 @ 07:17]              35.2     144  |  112  |  33  ----------------------------<  74      [05-24-21 @ 07:14]  4.3   |  19  |  3.65        Ca     9.3     [05-24-21 @ 07:14]            Creatinine Trend:  SCr 3.65 [05-24 @ 07:14]  SCr 3.81 [05-23 @ 07:30]  SCr 3.94 [05-22 @ 06:52]  SCr 3.98 [05-21 @ 07:10]  SCr 4.08 [05-20 @ 19:08]    Urinalysis - [05-20-21 @ 19:48]      Color Light Yellow / Appearance Clear / SG 1.017 / pH 6.0      Gluc Negative / Ketone Negative  / Bili Negative / Urobili Negative       Blood Trace / Protein 30 mg/dL / Leuk Est Negative / Nitrite Negative      RBC 1 / WBC 1 / Hyaline 2 / Gran  / Sq Epi  / Non Sq Epi 0 / Bacteria Negative    Urine Creatinine 116      [05-21-21 @ 06:41]  Urine Protein 14      [05-21-21 @ 06:41]  Urine Sodium 70      [05-21-21 @ 06:41]

## 2021-05-24 NOTE — CONSULT NOTE ADULT - ASSESSMENT
75M with newly diagnosed metastatic gastric cancer with new onset GIOVANNI and renal US demonstrating left hydronephrosis, right nonobstructing calculi.    -CT abdomen pelvis ordered to rule out obstructing stone in left ureter  -Cr improving, suggesting pre-renal or renal etiology rather than obstructive  -continue to trend Cr  -NPO at midnight in case CT demonstrates stone necessitating add on for left ureteral stent  -discussed plan with Dr. Mullins and patient    Frantz Holbrook MD  [facility]  [address], [address], [place] [address]  Office: [phone] Fax: [phone]  
75 yr old male w/ recent diagnosis of stage IV gastric carcinoma sent from [facility] [place] with abnormal labs, notably elevated sCr. Pt notes normal renal function about two weeks ago. Notes he was also recently in ED earlier this week where he was really dehdrated and received some IVF (?500 cc NS) and then sent home. Notes he had decreased urine output that day but has otherwise not noted a decrease in urine output at home. No blood in urine. Denies new medications including NSAIDS or antibiotics. Has had reduced oral intake in the last 3 weeks associated with lack of appetite. Currently w/ mild nausea and discomfort.  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 Plan to start chemo.    (20 May 2021 21:37)    Hematology/Oncology called to see patient who was seen in consultation by Dr. Kendall Arellano and [facility] on 5/20/2021 for stage IV gastric cancer. History provided by Dr. Arellano as follows:  75-year-old male with HTN was experiencing abdominal pain, fatigue and weight loss of 10 pounds, with symptoms worsening over 6 week period  Patient saw PMD and was referred to gastroenterology 4/15/2021 CT A/P w/o con outside report – mesenteric, retroperitoneal, pelvic adenopathy concerning for malignancy, innumerable hyper and hypodense cysts throughout kidneys demonstrating peripheral calcifications, compatible with polycystic kidney disease. Multiple lytic and sclerotic lesions throughout sacrum and to lesser extent pelvis concerning for metastasis.  Prostatomegaly. Hypoattenuating lesions in liver may represent cysts, however metastases cannot be entirely excluded  5/5/2021 PET/CT [facility] outside report - linear segment of hypermetabolism associated with lesser curvature of mid stomach measures 4 x 1cm, SUV 6.0; numerous hypo and  hyperdense cysts throughout bilateral kidneys, multiple hypodense lesions in liver consistent with hepatic cysts; FDG avid posterior mediastinal lymph node mass adjacent to mid esophagus, SUV 5.0, 2.1 x 1.9cm. Numerous FDG avid lymph nodes throughout abdomen and pelvis for example right external iliac node, SUV 5.1, 1.7 x 1.4cm. Inactive left thyroid nodule, lack of uptake favors adenoma. No aggressive osseous lesions seen.  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 Avelina Chaudhry of [facility] who informed patient of stage IV gastric cancer diagnosis, palliative intent of treatment and recommended DOF + Herceptin (docetaxel 70mg/m2 d1, oxaliplatin 130mg/m2, IVCI fluorouracil 750mg/m2 d1-5, trastuzumab 8mg/kg loading dose then 6 mg/kg maintenance every 3 weeks as part of 21 day schedule)  As of day of appointment, patient had not signed release of information to request pathology and imaging review at [facility] review not yet available  Patient reports he feels well, is urinating less. Patient notes that he had eGFR >100 last week but within a few days had dropped significantly with no cause known.    Stage IV Gastric Cancer  --Seen by Dr. Kendall Arellano at [facility]  --Treatment per Dr. Arellano    Acute Kidney Injury  --Nephrology Consult appreciated  --Workup underway    We will continue to follow patient and coordinate with [facility]    Josr Nunes PA-C  Hematology/Oncology  [facility]   [phone] (cell)  [phone] (office)  [phone] (alt office)  Evenings and weekends please call MD on call or office  
75y M with gastric cancer presenting with acute renal failure    #GIOVANNI  - Pt reportedly had normal renal fxn a week ago, states his Cr was ~1mg/dl, now presenting with Cr of 4mg/dl in setting of poor Po intake and ? ARB use. Possibly now with ATN  - UA relatively bland, no significant proteinuria  - Agree with IVF, would give additional 1L bolus of LR and c/w LR at 125cc/hr for one day  - Would check renal US, despite thibodeaux catheter he is not making significant amount of urine. Could have hydro 2/2 RP lymphadenopathy?  - Dose medications to eGFR<10  - If no significant improvement in the next few days would need to consider renal biopsy    Hypernatremia  - Na 149, encourage PO water intake. Can hold off on changing IVF but if persists can change to 1/2 NS tmrw

## 2021-05-24 NOTE — PHYSICAL THERAPY INITIAL EVALUATION ADULT - DIAGNOSIS, PT EVAL
To get better and follow your care plan as instructed. Pt presents with pain, decreased strength, decreased balance, and decreased endurance limiting overall independence with mobility.

## 2021-05-24 NOTE — PROGRESS NOTE ADULT - SUBJECTIVE AND OBJECTIVE BOX
Patient is a 75y old  Male who presents with a chief complaint of GIOVANNI (24 May 2021 10:03)    Patient seen this morning. Reporting nausea and bloating.    MEDICATIONS  (STANDING):  heparin   Injectable 5000 Unit(s) SubCutaneous every 8 hours  lactated ringers. 1000 milliLiter(s) (50 mL/Hr) IV Continuous <Continuous>  latanoprost 0.005% Ophthalmic Solution 1 Drop(s) Both EYES at bedtime  polyethylene glycol 3350 17 Gram(s) Oral daily  senna 2 Tablet(s) Oral at bedtime    MEDICATIONS  (PRN):  acetaminophen   Tablet .. 975 milliGRAM(s) Oral every 8 hours PRN Mild Pain (1 - 3), Moderate Pain (4 - 6)  ondansetron Injectable 4 milliGRAM(s) IV Push every 6 hours PRN Nausea        Vital Signs Last 24 Hrs  T(C): 37.1 (24 May 2021 05:57), Max: 37.2 (23 May 2021 20:55)  T(F): 98.8 (24 May 2021 05:57), Max: 98.9 (23 May 2021 20:55)  HR: 66 (24 May 2021 08:44) (59 - 68)  BP: 119/63 (24 May 2021 08:44) (119/63 - 162/80)  BP(mean): --  RR: 18 (24 May 2021 05:57) (18 - 18)  SpO2: 98% (24 May 2021 05:57) (97% - 98%)    PE  NAD  Awake, alert  Anicteric  No rash grossly                            11.7   7.64  )-----------( 181      ( 24 May 2021 07:17 )             35.2       05-24    144  |  112<H>  |  33<H>  ----------------------------<  74  4.3   |  19<L>  |  3.65<H>    Ca    9.3      24 May 2021 07:14

## 2021-05-24 NOTE — PROGRESS NOTE ADULT - SUBJECTIVE AND OBJECTIVE BOX
Date of service: 05-24-21 @ 21:57      Patient is a 75y old  Male who presents with a chief complaint of GIOVANNI (24 May 2021 16:04)                                                               INTERVAL HPI/OVERNIGHT EVENTS:    REVIEW OF SYSTEMS:     CONSTITUTIONAL: No weakness, fevers or chills  EYES/ENT: No visual changes , no ear ache   NECK: No pain or stiffness  RESPIRATORY: No cough, wheezing,  No shortness of breath  CARDIOVASCULAR: No chest pain or palpitations  GASTROINTESTINAL: No abdominal pain  . No nausea, vomiting, or hematemesis; No diarrhea or constipation. No melena or hematochezia.  GENITOURINARY: No dysuria, frequency or hematuria  NEUROLOGICAL: No numbness or weakness  SKIN: No itching, burning, rashes, or lesions                                                                                                                                                                                                                                                                                 Medications:  MEDICATIONS  (STANDING):  heparin   Injectable 5000 Unit(s) SubCutaneous every 8 hours  lactated ringers. 1000 milliLiter(s) (50 mL/Hr) IV Continuous <Continuous>  latanoprost 0.005% Ophthalmic Solution 1 Drop(s) Both EYES at bedtime  polyethylene glycol 3350 17 Gram(s) Oral daily  senna 2 Tablet(s) Oral at bedtime    MEDICATIONS  (PRN):  acetaminophen   Tablet .. 975 milliGRAM(s) Oral every 8 hours PRN Mild Pain (1 - 3), Moderate Pain (4 - 6)  metoclopramide Injectable 5 milliGRAM(s) IV Push every 8 hours PRN nausea/vomitting       Allergies    aspirin (Rash)    Intolerances      Vital Signs Last 24 Hrs  T(C): 37.2 (24 May 2021 20:19), Max: 37.2 (24 May 2021 20:19)  T(F): 98.9 (24 May 2021 20:19), Max: 98.9 (24 May 2021 20:19)  HR: 70 (24 May 2021 20:19) (59 - 70)  BP: 172/78 (24 May 2021 20:19) (119/63 - 174/78)  BP(mean): --  RR: 17 (24 May 2021 20:19) (17 - 18)  SpO2: 96% (24 May 2021 20:19) (96% - 99%)  CAPILLARY BLOOD GLUCOSE          05-23 @ 07:01  -  05-24 @ 07:00  --------------------------------------------------------  IN: 1990 mL / OUT: 1125 mL / NET: 865 mL    05-24 @ 07:01  -  05-24 @ 21:57  --------------------------------------------------------  IN: 0 mL / OUT: 0 mL / NET: 0 mL      Physical Exam:    Daily     Daily   General:  Well appearing, NAD, not cachetic  HEENT:  Nonicteric, PERRLA  CV:  RRR, S1S2   Lungs:  CTA B/L, no wheezes, rales, rhonchi  Abdomen:  Soft, non-tender, no distended, positive BS  Extremities:  2+ pulses, no c/c, no edema  Skin:  Warm and dry, no rashes  :  No thibodeaux  Neuro:  AAOx3, non-focal, grossly intact                                                                                                                                                                                                                                                                                                LABS:                               11.7   7.64  )-----------( 181      ( 24 May 2021 07:17 )             35.2                      05-24    144  |  112<H>  |  33<H>  ----------------------------<  74  4.3   |  19<L>  |  3.65<H>    Ca    9.3      24 May 2021 07:14                         RADIOLOGY & ADDITIONAL TESTS         I personally reviewed: [  ]EKG   [  ]CXR    [  ] CT      A/P:         Discussed with :     [facility] consultants' Notes   Time spent :

## 2021-05-24 NOTE — PHYSICAL THERAPY INITIAL EVALUATION ADULT - PERTINENT HX OF CURRENT PROBLEM, REHAB EVAL
75 yr old male w/ recent diagnosis of stage IV gastric carcinoma sent from [facility] [place] [place] with noted outpt abnormal labs concerning for new onset acute renal failure. Dx: Acute Kidney Failure, Gastric cancer known metastasis to peritoneal lymph nodes, HTN.

## 2021-05-24 NOTE — CONSULT NOTE ADULT - SUBJECTIVE AND OBJECTIVE BOX
Patient is a 75y old  Male who presents with a chief complaint of GIOVANNI (24 May 2021 11:18)      HPI:  75 yr old male w/ recent diagnosis of stage IV gastric carcinoma sent from [facility] [place] with abnormal labs, notably elevated sCr. Pt notes normal renal function about two weeks ago. Notes he was also recently in ED earlier this week where he was really dehdrated and received some IVF (?500 cc NS) and then sent home. Notes he had decreased urine output that day but has otherwise not noted a decrease in urine output at home. No blood in urine. Denies new medications including NSAIDS or antibiotics. Has had reduced oral intake in the last 3 weeks associated with lack of appetite. Currently w/ mild nausea and discomfort.  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 Plan to start chemo.    (20 May 2021 21:37)     consulted for finding of left hydronephrosis in setting of new onset GIOVANNI.      PAST MEDICAL & SURGICAL HISTORY:  HTN (hypertension)    Polycystic kidney disease  ??    Raynauds syndrome    Gastric cancer  stage IV    Cataract        REVIEW OF SYSTEMS:    CONSTITUTIONAL: No weakness, fevers or chills  HEENT: No visual changes;  No vertigo or throat pain   ENDO: No sweating, no palpitations  NECK: No pain or stiffness  MUSCULOSKELETAL: No back pain, no joint pain  RESPIRATORY: No cough, wheezing, hemoptysis; No shortness of breath  CARDIOVASCULAR: No chest pain or palpitations  GASTROINTESTINAL: No abdominal or epigastric pain. +nausea, vomiting, no hematemesis; No diarrhea or constipation. No melena or hematochezia.  NEUROLOGICAL: No numbness or weakness  PSYCH: No depression, no mood changes  SKIN: No itching, burning, rashes, or lesions   All other review of systems is negative unless indicated above.    MEDICATIONS  (STANDING):  heparin   Injectable 5000 Unit(s) SubCutaneous every 8 hours  lactated ringers. 1000 milliLiter(s) (50 mL/Hr) IV Continuous <Continuous>  latanoprost 0.005% Ophthalmic Solution 1 Drop(s) Both EYES at bedtime  polyethylene glycol 3350 17 Gram(s) Oral daily  senna 2 Tablet(s) Oral at bedtime    MEDICATIONS  (PRN):  acetaminophen   Tablet .. 975 milliGRAM(s) Oral every 8 hours PRN Mild Pain (1 - 3), Moderate Pain (4 - 6)  metoclopramide Injectable 5 milliGRAM(s) IV Push every 8 hours PRN nausea/vomitting      Allergies    aspirin (Rash)    Intolerances        SOCIAL HISTORY: No illicit drug use    FAMILY HISTORY:  FH: liver cancer (Mother)        Vital Signs Last 24 Hrs  T(C): 36.8 (24 May 2021 11:51), Max: 37.2 (23 May 2021 20:55)  T(F): 98.2 (24 May 2021 11:51), Max: 98.9 (23 May 2021 20:55)  HR: 62 (24 May 2021 12:42) (59 - 68)  BP: 136/65 (24 May 2021 12:42) (119/63 - 174/78)  BP(mean): --  RR: 18 (24 May 2021 11:51) (18 - 18)  SpO2: 99% (24 May 2021 11:51) (97% - 99%)    PHYSICAL EXAM:    Constitutional: NAD, well-developed  HEENT: JENNA, EOMI, Normal Hearing, MMM  Neck: No JVD  Back: No CVA tenderness  Respiratory: No accessory respiratory muscle use  Abd: Soft, NT/ND, no CVAT  Extremities: No calf tenderness  Neurological: A/O x 3, no focal deficits  Psychiatric: Normal mood, normal affect  Skin: No rashes    I&O's Summary    23 May 2021 07:01  -  24 May 2021 07:00  --------------------------------------------------------  IN: 1990 mL / OUT: 1125 mL / NET: 865 mL    24 May 2021 07:01  -  24 May 2021 16:06  --------------------------------------------------------  IN: 0 mL / OUT: 0 mL / NET: 0 mL        LABS:                        11.7   7.64  )-----------( 181      ( 24 May 2021 07:17 )             35.2     05-24    144  |  112<H>  |  33<H>  ----------------------------<  74  4.3   |  19<L>  |  3.65<H>    Ca    9.3      24 May 2021 07:14          Urine Culture: thibodeaux in place    RADIOLOGY & ADDITIONAL STUDIES:    Renal US 5/22/21    Right kidney: 11.2 cm. No hydronephrosis. Increased cortical echogenicity. Multiple cysts, some of which contain septations. For example, a 2.2 x 2.5 x 2.7 cm interpolar cyst contains internal septations. Multiple nonobstructing calculi are present, the largest in the interpolar region measuring 1.2 cm.    Left kidney: 12.5 cm. Mild to moderate hydronephrosis. There is echogenic material within the hydronephrotic left lower pole calyces. Increased cortical echogenicity. Multiple cysts. The largest is a complex cyst in the upper pole measuring 4.4 x 4.6 x 4.3 cm, with calcified wall and internal echogenic material. A 2.6 x 2.5 x 2.6 cm interpolar cyst contains an internal septation.    Urinary bladder: Collapsed with a Thibodeaux catheter, limiting evaluation.

## 2021-05-24 NOTE — PROGRESS NOTE ADULT - ASSESSMENT
75y M with gastric cancer presenting with acute renal failure    #GIOVANNI  - Pt reportedly had normal renal fxn a week ago, states his Cr was ~1mg/dl, now presenting with Cr of 4mg/dl in setting of poor Po intake and ? ARB use. Possibly now with ATN  - UA relatively bland, no significant proteinuria  - Cr downtrending to 3.6mg/dl  - Renal US findings noted, patient with multiple renal cysts b/l and likely underlying cystic disease but he does not have PCKD as his renal failure is acute, his Cr was .66mg/dl back on 5/14 and acutely eliot to 3.97mg/dl on 5/17 which would not be consistent with a hx of PCKD.  - Left renal collecting system with moderate hydronephrosis with echogenic material despite thibodeaux, recommend urology consult, may require an MRI to better assess if indicated, can also be done outpt from Nephrology standpoint  - Agree with IVF,  reduce to 50cc/hr given the bloating  - Would try to control pt's nausea, vomiting, bloating as that would help him have an oral intake and remove need for IVF. Consider increasing the zofran and adding H2 Blocker?  - Dose medications to eGFR<15 for now    Hypernatremia  - Resolved, Na 144

## 2021-05-24 NOTE — PHYSICAL THERAPY INITIAL EVALUATION ADULT - GAIT DEVIATIONS NOTED, PT EVAL
Narrow LUCY, increased M/L sway/decreased lauren/decreased stride length/increased stride width/decreased weight-shifting ability

## 2021-05-24 NOTE — PHYSICAL THERAPY INITIAL EVALUATION ADULT - ADDITIONAL COMMENTS
Pt lives with wife in a private home with 7 steps inside. PTA pt was independent with ADLs and amb without an AD.

## 2021-05-24 NOTE — PROGRESS NOTE ADULT - ASSESSMENT
75 yr old male w/ recent diagnosis of stage IV gastric carcinoma [place] [facility] [place], notably elevated sCr. Pt notes normal renal function about two weeks ago. Notes he was also recently in ED earlier this week where he was really dehdrated and received some IVF (?500 cc NS) and then sent home. Notes he had decreased urine output that day but has otherwise not noted a decrease in urine output at home. No blood in urine. Denies new medications including NSAIDS or antibiotics. Has had reduced oral intake in the last 3 weeks associated with lack of appetite. Currently w/ mild nausea and discomfort.  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 Plan to start chemo.    (20 May 2021 21:37)    Hematology/Oncology called to see patient who was seen in consultation by Dr. Kendall Arellano and [facility] on 5/20/2021 for stage IV gastric cancer. History provided by Dr. Arellano as follows:  75-year-old male with HTN was experiencing abdominal pain, fatigue and weight loss of 10 pounds, with symptoms worsening over 6 week period  Patient saw PMD and was referred to gastroenterology 4/15/2021 CT A/P w/o con outside report – mesenteric, retroperitoneal, pelvic adenopathy concerning for malignancy, innumerable hyper and hypodense cysts throughout kidneys demonstrating peripheral calcifications, compatible with polycystic kidney disease. Multiple lytic and sclerotic lesions throughout sacrum and to lesser extent pelvis concerning for metastasis.  Prostatomegaly. Hypoattenuating lesions in liver may represent cysts, however metastases cannot be entirely excluded  5/5/2021 PET/CT [facility] outside report - linear segment of hypermetabolism associated with lesser curvature of mid stomach measures 4 x 1cm, SUV 6.0; numerous hypo and  hyperdense cysts throughout bilateral kidneys, multiple hypodense lesions in liver consistent with hepatic cysts; FDG avid posterior mediastinal lymph node mass adjacent to mid esophagus, SUV 5.0, 2.1 x 1.9cm. Numerous FDG avid lymph nodes throughout abdomen and pelvis for example right external iliac node, SUV 5.1, 1.7 x 1.4cm. Inactive left thyroid nodule, lack of uptake favors adenoma. No aggressive osseous lesions seen.  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 Avelina Chaudhry of [facility] who informed patient of stage IV gastric cancer diagnosis, palliative intent of treatment and recommended DOF + Herceptin (docetaxel 70mg/m2 d1, oxaliplatin 130mg/m2, IVCI fluorouracil 750mg/m2 d1-5, trastuzumab 8mg/kg loading dose then 6 mg/kg maintenance every 3 weeks as part of 21 day schedule)  As of day of appointment, patient had not signed release of information to request pathology and imaging [place] [facility] [facility] [place] not yet available  Patient reports he feels well, is urinating less. Patient notes that he had eGFR >100 last week but within a few days had dropped significantly with no cause known.    Stage IV Gastric Cancer  --Seen by Dr. Kendall Arellano at [facility]  --Treatment per Dr. Arellano    Acute Kidney Injury  --Nephrology Consult appreciated; recommended IVF and USG to assess for hydronephrosis   --Cr more or less stable; 3.65  --ATN vs. ARB induced vs. hydronephrosis ;  noted US to have increased renal cortical echogenicity bilaterally which may be secondary to medical renal disease; mild to moderate left hydronephrosis with echogenic material noted within the left renal collecting system.   --Recommend  to evaluate hydronephrosis    We will be happy to answer any onc questions on behalf of [facility] and will be in touch with them.       Josr Nunes PA-C  Hematology/Oncology  [facility]   [phone] (cell)  [phone] (office)  [phone] (alt office)  Evenings and weekends please call MD on call or office

## 2021-05-25 ENCOUNTER — TRANSCRIPTION ENCOUNTER (OUTPATIENT)
Age: 76
End: 2021-05-25

## 2021-05-25 LAB
ANION GAP SERPL CALC-SCNC: 14 MMOL/L — SIGNIFICANT CHANGE UP (ref 5–17)
APPEARANCE UR: CLEAR — SIGNIFICANT CHANGE UP
BACTERIA # UR AUTO: NEGATIVE — SIGNIFICANT CHANGE UP
BILIRUB UR-MCNC: NEGATIVE — SIGNIFICANT CHANGE UP
BUN SERPL-MCNC: 29 MG/DL — HIGH (ref 7–23)
CALCIUM SERPL-MCNC: 9.3 MG/DL — SIGNIFICANT CHANGE UP (ref 8.4–10.5)
CHLORIDE SERPL-SCNC: 112 MMOL/L — HIGH (ref 96–108)
CHLORIDE UR-SCNC: 76 MMOL/L — SIGNIFICANT CHANGE UP
CO2 SERPL-SCNC: 18 MMOL/L — LOW (ref 22–31)
COLOR SPEC: SIGNIFICANT CHANGE UP
CREAT ?TM UR-MCNC: 107 MG/DL — SIGNIFICANT CHANGE UP
CREAT SERPL-MCNC: 3.71 MG/DL — HIGH (ref 0.5–1.3)
DIFF PNL FLD: ABNORMAL
EPI CELLS # UR: 0 /HPF — SIGNIFICANT CHANGE UP
GLUCOSE SERPL-MCNC: 71 MG/DL — SIGNIFICANT CHANGE UP (ref 70–99)
GLUCOSE UR QL: NEGATIVE — SIGNIFICANT CHANGE UP
HAV IGM SER-ACNC: SIGNIFICANT CHANGE UP
HBV CORE IGM SER-ACNC: SIGNIFICANT CHANGE UP
HBV SURFACE AG SER-ACNC: SIGNIFICANT CHANGE UP
HCT VFR BLD CALC: 34.2 % — LOW (ref 39–50)
HCV AB S/CO SERPL IA: 0.84 S/CO — SIGNIFICANT CHANGE UP (ref 0–0.99)
HCV AB SERPL-IMP: SIGNIFICANT CHANGE UP
HGB BLD-MCNC: 11.3 G/DL — LOW (ref 13–17)
HYALINE CASTS # UR AUTO: 0 /LPF — SIGNIFICANT CHANGE UP (ref 0–2)
KETONES UR-MCNC: ABNORMAL
LEUKOCYTE ESTERASE UR-ACNC: ABNORMAL
MCHC RBC-ENTMCNC: 28.1 PG — SIGNIFICANT CHANGE UP (ref 27–34)
MCHC RBC-ENTMCNC: 33 GM/DL — SIGNIFICANT CHANGE UP (ref 32–36)
MCV RBC AUTO: 85.1 FL — SIGNIFICANT CHANGE UP (ref 80–100)
NITRITE UR-MCNC: NEGATIVE — SIGNIFICANT CHANGE UP
NRBC # BLD: 0 /100 WBCS — SIGNIFICANT CHANGE UP (ref 0–0)
PH UR: 6 — SIGNIFICANT CHANGE UP (ref 5–8)
PLATELET # BLD AUTO: 197 K/UL — SIGNIFICANT CHANGE UP (ref 150–400)
POTASSIUM SERPL-MCNC: 4.2 MMOL/L — SIGNIFICANT CHANGE UP (ref 3.5–5.3)
POTASSIUM SERPL-SCNC: 4.2 MMOL/L — SIGNIFICANT CHANGE UP (ref 3.5–5.3)
POTASSIUM UR-SCNC: 23 MMOL/L — SIGNIFICANT CHANGE UP
PROT ?TM UR-MCNC: 18 MG/DL — HIGH (ref 0–12)
PROT UR-MCNC: ABNORMAL
PROT/CREAT UR-RTO: 0.2 RATIO — SIGNIFICANT CHANGE UP (ref 0–0.2)
RBC # BLD: 4.02 M/UL — LOW (ref 4.2–5.8)
RBC # FLD: 15.5 % — HIGH (ref 10.3–14.5)
RBC CASTS # UR COMP ASSIST: 11 /HPF — HIGH (ref 0–4)
SODIUM SERPL-SCNC: 144 MMOL/L — SIGNIFICANT CHANGE UP (ref 135–145)
SODIUM UR-SCNC: 96 MMOL/L — SIGNIFICANT CHANGE UP
SP GR SPEC: 1.01 — SIGNIFICANT CHANGE UP (ref 1.01–1.02)
UROBILINOGEN FLD QL: NEGATIVE — SIGNIFICANT CHANGE UP
WBC # BLD: 8.17 K/UL — SIGNIFICANT CHANGE UP (ref 3.8–10.5)
WBC # FLD AUTO: 8.17 K/UL — SIGNIFICANT CHANGE UP (ref 3.8–10.5)
WBC UR QL: 7 /HPF — HIGH (ref 0–5)

## 2021-05-25 PROCEDURE — 99233 SBSQ HOSP IP/OBS HIGH 50: CPT | Mod: GC

## 2021-05-25 PROCEDURE — 78708 K FLOW/FUNCT IMAGE W/DRUG: CPT | Mod: 26

## 2021-05-25 RX ORDER — AMLODIPINE BESYLATE 2.5 MG/1
5 TABLET ORAL DAILY
Refills: 0 | Status: DISCONTINUED | OUTPATIENT
Start: 2021-05-25 | End: 2021-05-28

## 2021-05-25 RX ORDER — SODIUM CHLORIDE 9 MG/ML
700 INJECTION INTRAMUSCULAR; INTRAVENOUS; SUBCUTANEOUS ONCE
Refills: 0 | Status: COMPLETED | OUTPATIENT
Start: 2021-05-25 | End: 2021-05-25

## 2021-05-25 RX ADMIN — HEPARIN SODIUM 5000 UNIT(S): 5000 INJECTION INTRAVENOUS; SUBCUTANEOUS at 21:17

## 2021-05-25 RX ADMIN — LATANOPROST 1 DROP(S): 0.05 SOLUTION/ DROPS OPHTHALMIC; TOPICAL at 21:17

## 2021-05-25 RX ADMIN — SODIUM CHLORIDE 50 MILLILITER(S): 9 INJECTION, SOLUTION INTRAVENOUS at 21:26

## 2021-05-25 RX ADMIN — SENNA PLUS 2 TABLET(S): 8.6 TABLET ORAL at 21:17

## 2021-05-25 RX ADMIN — HEPARIN SODIUM 5000 UNIT(S): 5000 INJECTION INTRAVENOUS; SUBCUTANEOUS at 15:33

## 2021-05-25 RX ADMIN — SODIUM CHLORIDE 700 MILLILITER(S): 9 INJECTION INTRAMUSCULAR; INTRAVENOUS; SUBCUTANEOUS at 11:10

## 2021-05-25 RX ADMIN — Medication 5 MILLIGRAM(S): at 07:47

## 2021-05-25 RX ADMIN — Medication 5 MILLIGRAM(S): at 15:40

## 2021-05-25 NOTE — PROGRESS NOTE ADULT - SUBJECTIVE AND OBJECTIVE BOX
[facility] DIVISION OF KIDNEY DISEASES AND HYPERTENSION -- FOLLOW UP NOTE  --------------------------------------------------------------------------------  Trent Rosales   Nephrology Fellow  Pager NS: [phone]/ [facility]: [phone]  (After 5 pm or on weekends please page the on-call fellow, can view the schedule on [facility]. Login is kita lr, schedule under [facility] medicine, psych, derm.      Patient is a 75y old  Male who presents with a chief complaint of GIOVANNI (25 May 2021 11:57)      24 hour events/subjective: Patient seen and examined at the bedside. Vital signs, labs, medications reviewed. Pt still with nausea, abdominal bloating improved. Non-oliguric, Cr stable.         PAST HISTORY  --------------------------------------------------------------------------------  No significant changes to PMH, PSH, FHx, SHx, unless otherwise noted    ALLERGIES & MEDICATIONS  --------------------------------------------------------------------------------  Allergies    aspirin (Rash)    Intolerances      Standing Inpatient Medications  heparin   Injectable 5000 Unit(s) SubCutaneous every 8 hours  lactated ringers. 1000 milliLiter(s) IV Continuous <Continuous>  latanoprost 0.005% Ophthalmic Solution 1 Drop(s) Both EYES at bedtime  polyethylene glycol 3350 17 Gram(s) Oral daily  senna 2 Tablet(s) Oral at bedtime    PRN Inpatient Medications  acetaminophen   Tablet .. 975 milliGRAM(s) Oral every 8 hours PRN  metoclopramide Injectable 5 milliGRAM(s) IV Push every 8 hours PRN      REVIEW OF SYSTEMS  --------------------------------------------------------------------------------  Gen: No fevers/chills  Skin: No rashes  Head/Eyes/Ears: Normal hearing, no difficulty seeing  Respiratory: No dyspnea, cough  CV: No chest pain  GI: No abdominal pain, diarrhea, + nausea  : No dysuria, hematuria  MSK: No  edema      >>> <<<    VITALS/PHYSICAL EXAM  --------------------------------------------------------------------------------  T(C): 37 (05-25-21 @ 04:43), Max: 37.2 (05-24-21 @ 20:19)  HR: 71 (05-25-21 @ 04:43) (66 - 71)  BP: 150/82 (05-25-21 @ 04:43) (132/67 - 172/78)  RR: 18 (05-25-21 @ 04:43) (17 - 18)  SpO2: 100% (05-25-21 @ 04:43) (96% - 100%)  Wt(kg): --        05-24-21 @ 07:01  -  05-25-21 @ 07:00  --------------------------------------------------------  IN: 0 mL / OUT: 1050 mL / NET: -1050 mL      Physical Exam:    	Gen: NAD  	HEENT: Anicteric  	Pulm: CTA B/L  	CV: S1S2  	Abd: Soft, +BS   	MSK: No LE edema B/L  	Neuro: Awake  	Skin: Warm and dry  	Vascular access:      LABS/STUDIES  --------------------------------------------------------------------------------              11.3   8.17  >-----------<  197      [05-25-21 @ 06:47]              34.2     144  |  112  |  29  ----------------------------<  71      [05-25-21 @ 06:47]  4.2   |  18  |  3.71        Ca     9.3     [05-25-21 @ 06:47]            Creatinine Trend:  SCr 3.71 [05-25 @ 06:47]  SCr 3.65 [05-24 @ 07:14]  SCr 3.81 [05-23 @ 07:30]  SCr 3.94 [05-22 @ 06:52]  SCr 3.98 [05-21 @ 07:10]    Urinalysis - [05-25-21 @ 07:49]      Color Light Yellow / Appearance Clear / SG 1.014 / pH 6.0      Gluc Negative / Ketone Small  / Bili Negative / Urobili Negative       Blood Moderate / Protein Trace / Leuk Est Large / Nitrite Negative      RBC 11 / WBC 7 / Hyaline 0 / Gran  / Sq Epi  / Non Sq Epi 0 / Bacteria Negative    Urine Creatinine 107      [05-25-21 @ 07:50]  Urine Protein 18      [05-25-21 @ 07:50]  Urine Sodium 96      [05-25-21 @ 07:50]  Urine Potassium 23      [05-25-21 @ 07:50]  Urine Chloride 76      [05-25-21 @ 07:50]

## 2021-05-25 NOTE — PROGRESS NOTE ADULT - ASSESSMENT
75 yr old male w/ recent diagnosis of stage IV gastric carcinoma sent from [facility] with noted outpt abnormal labs concerning for new onset acute renal failure.     Problem/Plan - 1:  ·  Problem: Acute kidney failure.  Plan: may have component of dehydration/pre renal but unclear cause of rapid decline; pt denies any new medications  - Renal US:< from: US Kidney and Bladder (05.22.21 @ 17:34) >  Increased renal cortical echogenicity bilaterally which may be secondary to medical renal disease.  Multiple bilateral renal cysts, some of which are complex. In particular, a cyst in the left kidney demonstrates peripheral calcification and internal echogenic material of uncertain etiology.  Mild to moderate left hydronephrosis with echogenic material noted within the left renal collecting system. Nonobstructing right intrarenal calculi.  - avoid nephrotoxic agents  - Cr now slwoly improving .. no obvious etiology .. eosinophils positive  : will dw neph  - discussed with urology : CT noted   called and discussed with nephro and urology ...  hydronehprosis mild to moderate and doubt it is the etiology of GIOVANNI however will check lasix scan , and cont lundy for other etiologies of GIOVANNI     Problem/Plan - 2:  ·  Problem: Gastric cancer.  Plan: Recent diagnosis, per pt, known metastasis to peritoneal lymph nodes  Heme onc following, appreciate recs  pain control- tylenol 975 mg TID for mild-mod pain, will add dilaudid po for severe pain  nausea - zofran 4 mg q6h prn.     Problem/Plan - 3:  ·  Problem: HTN (hypertension).  Plan: will start Norvasc     Problem/Plan - 4:  ·  Problem: back pain : sec to ruptured ? cyst   improved   no neuro deificits

## 2021-05-25 NOTE — PROGRESS NOTE ADULT - ASSESSMENT
75 yr old male w/ recent diagnosis of stage IV gastric carcinoma sent [place] [facility] with abnormal labs, notably elevated sCr. Pt notes normal renal function about two weeks ago. Notes he was also recently in ED earlier this week where he was really dehdrated and received some IVF (?500 cc NS) and then sent home. Notes he had decreased urine output that day but has otherwise not noted a decrease in urine output at home. No blood in urine. Denies new medications including NSAIDS or antibiotics. Has had reduced oral intake in the last 3 weeks associated with lack of appetite. Currently w/ mild nausea and discomfort.  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 Plan to start chemo.    (20 May 2021 21:37)    Hematology/Oncology called to see patient who was seen in consultation by Dr. Kendall Arellano and [facility] on 5/20/2021 for stage IV gastric cancer. History provided by Dr. Arellano as follows:  75-year-old male with HTN was experiencing abdominal pain, fatigue and weight loss of 10 pounds, with symptoms worsening over 6 week period  Patient saw PMD and was referred to gastroenterology 4/15/2021 CT A/P w/o con outside report – mesenteric, retroperitoneal, pelvic adenopathy concerning for malignancy, innumerable hyper and hypodense cysts throughout kidneys demonstrating peripheral calcifications, compatible with polycystic kidney disease. Multiple lytic and sclerotic lesions throughout sacrum and to lesser extent pelvis concerning for metastasis.  Prostatomegaly. Hypoattenuating lesions in liver may represent cysts, however metastases cannot be entirely excluded  5/5/2021 PET/CT [facility] outside report - linear segment of hypermetabolism associated with lesser curvature of mid stomach measures 4 x 1cm, SUV 6.0; numerous hypo and  hyperdense cysts throughout bilateral kidneys, multiple hypodense lesions in liver consistent with hepatic cysts; FDG avid posterior mediastinal lymph node mass adjacent to mid esophagus, SUV 5.0, 2.1 x 1.9cm. Numerous FDG avid lymph nodes throughout abdomen and pelvis for example right external iliac node, SUV 5.1, 1.7 x 1.4cm. Inactive left thyroid nodule, lack of uptake favors adenoma. No aggressive osseous lesions seen.  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 Avelina Chaudhry of [facility] who informed patient of stage IV gastric cancer diagnosis, palliative intent of treatment and recommended DOF + Herceptin (docetaxel 70mg/m2 d1, oxaliplatin 130mg/m2, IVCI fluorouracil 750mg/m2 d1-5, trastuzumab 8mg/kg loading dose then 6 mg/kg maintenance every 3 weeks as part of 21 day schedule)  As of day of appointment, patient had not signed release of information to request pathology and imaging review at [facility], [facility] review not yet available  Patient reports he feels well, is urinating less. Patient notes that he had eGFR >100 last week but within a few days had dropped significantly with no cause known.    Stage IV Gastric Cancer  --Seen by Dr. Kendall Arellano at [facility]  --Treatment per Dr. Arellano    Acute Kidney Injury  --Nephrology Consult appreciated; recommendeded IVF and USG to assess for hydronephrosis   --Cr more or less stable; 3.71  --ATN vs. ARB induced vs. hydronephrosis ;  noted US to have increased renal cortical echogenicity bilaterally which may be secondary to medical renal disease; mild to moderate left hydronephrosis with echogenic material noted within the left renal collecting system.   --Patient to get "Lasix" Scan to assess renal function  --Urine Eosinophils positive  --CT shows mild/moderate left hydronephrosis  --Appreciate  evaluation    We will be happy to answer any onc questions on behalf of [facility] and will be in touch with them.       Josr Nunes PA-C  Hematology/Oncology  [facility]   [phone] (cell)  [phone] (office)  [phone] (alt office)  Evenings and weekends please call MD on call or office

## 2021-05-25 NOTE — PROGRESS NOTE ADULT - SUBJECTIVE AND OBJECTIVE BOX
Date of service: 05-25-21 @ 19:59      Patient is a 75y old  Male who presents with a chief complaint of GIOVANNI (25 May 2021 14:58)                                                               INTERVAL HPI/OVERNIGHT EVENTS:    REVIEW OF SYSTEMS:     CONSTITUTIONAL: No weakness, fevers or chills  EYES/ENT: No visual changes , no ear ache   NECK: No pain or stiffness  RESPIRATORY: No cough, wheezing,  No shortness of breath  CARDIOVASCULAR: No chest pain or palpitations  GASTROINTESTINAL: No abdominal pain  . No nausea, vomiting, or hematemesis; No diarrhea or constipation. No melena or hematochezia.  GENITOURINARY: No dysuria, frequency or hematuria  NEUROLOGICAL: No numbness or weakness  SKIN: No itching, burning, rashes, or lesions                                                                                                                                                                                                                                                                                 Medications:  MEDICATIONS  (STANDING):  heparin   Injectable 5000 Unit(s) SubCutaneous every 8 hours  lactated ringers. 1000 milliLiter(s) (50 mL/Hr) IV Continuous <Continuous>  latanoprost 0.005% Ophthalmic Solution 1 Drop(s) Both EYES at bedtime  polyethylene glycol 3350 17 Gram(s) Oral daily  senna 2 Tablet(s) Oral at bedtime    MEDICATIONS  (PRN):  acetaminophen   Tablet .. 975 milliGRAM(s) Oral every 8 hours PRN Mild Pain (1 - 3), Moderate Pain (4 - 6)  metoclopramide Injectable 5 milliGRAM(s) IV Push every 8 hours PRN nausea/vomitting       Allergies    aspirin (Rash)    Intolerances      Vital Signs Last 24 Hrs  T(C): 36.9 (25 May 2021 15:01), Max: 37.2 (24 May 2021 20:19)  T(F): 98.4 (25 May 2021 15:01), Max: 98.9 (24 May 2021 20:19)  HR: 79 (25 May 2021 16:16) (66 - 79)  BP: 145/76 (25 May 2021 16:16) (132/67 - 176/75)  BP(mean): --  RR: 17 (25 May 2021 15:01) (17 - 18)  SpO2: 100% (25 May 2021 15:10) (96% - 100%)  CAPILLARY BLOOD GLUCOSE          05-24 @ 07:01  -  05-25 @ 07:00  --------------------------------------------------------  IN: 0 mL / OUT: 1050 mL / NET: -1050 mL    05-25 @ 07:01  -  05-25 @ 19:59  --------------------------------------------------------  IN: 0 mL / OUT: 500 mL / NET: -500 mL      Physical Exam:    Daily     Daily   General:  Well appearing, NAD, not cachetic  HEENT:  Nonicteric, PERRLA  CV:  RRR, S1S2   Lungs:  CTA B/L, no wheezes, rales, rhonchi  Abdomen:  Soft, non-tender, no distended, positive BS  Extremities: no edema  Neuro:  AAOx3, non-focal, grossly intact                                                                                                                                                                                                                                                                                                LABS:                               11.3   8.17  )-----------( 197      ( 25 May 2021 06:47 )             34.2                      05-25    144  |  112<H>  |  29<H>  ----------------------------<  71  4.2   |  18<L>  |  3.71<H>    Ca    9.3      25 May 2021 06:47                         RADIOLOGY & ADDITIONAL TESTS         I personally reviewed: [  ]EKG   [  ]CXR    [  ] CT      A/P:         Discussed with :     [facility] consultants' Notes   Time spent :

## 2021-05-25 NOTE — PROGRESS NOTE ADULT - SUBJECTIVE AND OBJECTIVE BOX
Patient is a 75y old  Male who presents with a chief complaint of GIOVANNI (24 May 2021 21:57)    Patient seen this morning. No new complaints. Nausea and abdominal pain under control.    MEDICATIONS  (STANDING):  heparin   Injectable 5000 Unit(s) SubCutaneous every 8 hours  lactated ringers. 1000 milliLiter(s) (50 mL/Hr) IV Continuous <Continuous>  latanoprost 0.005% Ophthalmic Solution 1 Drop(s) Both EYES at bedtime  polyethylene glycol 3350 17 Gram(s) Oral daily  senna 2 Tablet(s) Oral at bedtime    MEDICATIONS  (PRN):  acetaminophen   Tablet .. 975 milliGRAM(s) Oral every 8 hours PRN Mild Pain (1 - 3), Moderate Pain (4 - 6)  metoclopramide Injectable 5 milliGRAM(s) IV Push every 8 hours PRN nausea/vomitting      Vital Signs Last 24 Hrs  T(C): 37 (25 May 2021 04:43), Max: 37.2 (24 May 2021 20:19)  T(F): 98.6 (25 May 2021 04:43), Max: 98.9 (24 May 2021 20:19)  HR: 71 (25 May 2021 04:43) (62 - 71)  BP: 150/82 (25 May 2021 04:43) (132/67 - 172/78)  BP(mean): --  RR: 18 (25 May 2021 04:43) (17 - 18)  SpO2: 100% (25 May 2021 04:43) (96% - 100%)    PE  NAD  Awake, alert  Anicteric  No rash grossly                            11.3   8.17  )-----------( 197      ( 25 May 2021 06:47 )             34.2       05-25    144  |  112<H>  |  29<H>  ----------------------------<  71  4.2   |  18<L>  |  3.71<H>    Ca    9.3      25 May 2021 06:47        ******PRELIMINARY REPORT******    ******PRELIMINARY REPORT******          EXAM:  CT ABDOMEN AND PELVIS                            PROCEDURE DATE:  05/24/2021      ******PRELIMINARY REPORT******    ******PRELIMINARY REPORT******              INTERPRETATION:  Small bilateral pleural effusions. Multiple bilateral renal cysts, some of which are partially calcified. Bilateral hyperdense cysts. Mild left hydronephrosis without nephrolithiasis. No right hydronephrosis. Nonobstructing 6 mm calculus in the right renal pelvis. Bladder is collapsed around Ramon catheter balloon. No bowel obstruction. Follow-up the official report.

## 2021-05-25 NOTE — DISCHARGE NOTE NURSING/CASE MANAGEMENT/SOCIAL WORK - PATIENT PORTAL LINK FT
You can access the FollowMyHealth Patient Portal offered by [facility] by registering at the following website: [email or web address]. By joining [email or web address]’s FollowMyHealth portal, you will also be able to view your health information using other applications (apps) compatible with our system.

## 2021-05-25 NOTE — PROGRESS NOTE ADULT - ASSESSMENT
75y M with gastric cancer presenting with acute renal failure    #GIOVANNI  - Pt reportedly had normal renal fxn a week ago, states his Cr was ~1mg/dl, now presenting with Cr of 4mg/dl in setting of poor Po intake and ? ARB use. Possibly now with ATN  - UA relatively bland, no significant proteinuria  - Cr stable at 3.6-3.7mg/dl  - Renal US findings noted, patient with multiple renal cysts b/l and likely underlying cystic disease but he does not have PCKD as his renal failure is acute, his Cr was .66mg/dl back on 5/14 and acutely eliot to 3.97mg/dl on 5/17 which would not be consistent with a hx of PCKD.  - Left renal collecting system with moderate hydronephrosis with echogenic material despite thibodeaux, appreciate urology recommendations Would pursue a renal lasix scan prior to consider decompression of the urinary system. Would give 100mg IV lasix during the lasix renal scan   - Agree with IVF,  reduce to 50cc/hr given the bloating  - Would try to control pt's nausea, vomiting, bloating as that would help him have an oral intake and remove need for IVF.   - Discussed with the daughter/wife the possibility of a kidney biopsy for prognostication and diagnosis if no improvement noted and lasix scan is unrevealing. For now would send serological workup. Please send tests for WILY, dsDNA, anti smith, RF, ANCAs, Anti PLA2R level, anti GBM, Hep panel, HIV , c3,c4, immunoglobulin free light chains, Serum immunofixation.   - Dose medications to eGFR<15 for now    #Hypernatremia  - Resolved, Na 144      #Metabolic acidosis  - NAGMA, 2/2 GIOVANNI  - Monitor bicarb for now, no indication for bicarb therapy at this time

## 2021-05-25 NOTE — DISCHARGE NOTE NURSING/CASE MANAGEMENT/SOCIAL WORK - NSDCPNINST_GEN_ALL_CORE
Patient was instructed to follow up with his provider after discharge. Pt was told to call ED if there is any symptoms of SOB or chest pain.

## 2021-05-26 LAB
% ALBUMIN: 50.1 % — SIGNIFICANT CHANGE UP
% ALPHA 1: 5.7 % — SIGNIFICANT CHANGE UP
% ALPHA 2: 16.4 % — SIGNIFICANT CHANGE UP
% BETA: 10 % — SIGNIFICANT CHANGE UP
% GAMMA: 17.8 % — SIGNIFICANT CHANGE UP
ALBUMIN SERPL ELPH-MCNC: 2.6 G/DL — LOW (ref 3.6–5.5)
ALBUMIN/GLOB SERPL ELPH: 1 RATIO — SIGNIFICANT CHANGE UP
ALPHA1 GLOB SERPL ELPH-MCNC: 0.3 G/DL — SIGNIFICANT CHANGE UP (ref 0.1–0.4)
ALPHA2 GLOB SERPL ELPH-MCNC: 0.9 G/DL — SIGNIFICANT CHANGE UP (ref 0.5–1)
ANION GAP SERPL CALC-SCNC: 14 MMOL/L — SIGNIFICANT CHANGE UP (ref 5–17)
B-GLOBULIN SERPL ELPH-MCNC: 0.5 G/DL — SIGNIFICANT CHANGE UP (ref 0.5–1)
BUN SERPL-MCNC: 31 MG/DL — HIGH (ref 7–23)
C3 SERPL-MCNC: 113 MG/DL — SIGNIFICANT CHANGE UP (ref 81–157)
C4 SERPL-MCNC: 36 MG/DL — SIGNIFICANT CHANGE UP (ref 13–39)
CALCIUM SERPL-MCNC: 9.7 MG/DL — SIGNIFICANT CHANGE UP (ref 8.4–10.5)
CHLORIDE SERPL-SCNC: 112 MMOL/L — HIGH (ref 96–108)
CO2 SERPL-SCNC: 19 MMOL/L — LOW (ref 22–31)
CREAT SERPL-MCNC: 3.75 MG/DL — HIGH (ref 0.5–1.3)
DSDNA AB SER-ACNC: <12 IU/ML — SIGNIFICANT CHANGE UP
GAMMA GLOBULIN: 0.9 G/DL — SIGNIFICANT CHANGE UP (ref 0.6–1.6)
GLUCOSE SERPL-MCNC: 85 MG/DL — SIGNIFICANT CHANGE UP (ref 70–99)
INTERPRETATION SERPL IFE-IMP: SIGNIFICANT CHANGE UP
POTASSIUM SERPL-MCNC: 4.5 MMOL/L — SIGNIFICANT CHANGE UP (ref 3.5–5.3)
POTASSIUM SERPL-SCNC: 4.5 MMOL/L — SIGNIFICANT CHANGE UP (ref 3.5–5.3)
PROT PATTERN SERPL ELPH-IMP: SIGNIFICANT CHANGE UP
PROT SERPL-MCNC: 5.2 G/DL — LOW (ref 6–8.3)
PROT SERPL-MCNC: 5.2 G/DL — LOW (ref 6–8.3)
SODIUM SERPL-SCNC: 145 MMOL/L — SIGNIFICANT CHANGE UP (ref 135–145)

## 2021-05-26 PROCEDURE — 93306 TTE W/DOPPLER COMPLETE: CPT | Mod: 26

## 2021-05-26 PROCEDURE — 93356 MYOCRD STRAIN IMG SPCKL TRCK: CPT

## 2021-05-26 RX ADMIN — HEPARIN SODIUM 5000 UNIT(S): 5000 INJECTION INTRAVENOUS; SUBCUTANEOUS at 22:09

## 2021-05-26 RX ADMIN — LATANOPROST 1 DROP(S): 0.05 SOLUTION/ DROPS OPHTHALMIC; TOPICAL at 22:09

## 2021-05-26 RX ADMIN — Medication 975 MILLIGRAM(S): at 04:01

## 2021-05-26 RX ADMIN — SODIUM CHLORIDE 50 MILLILITER(S): 9 INJECTION, SOLUTION INTRAVENOUS at 10:26

## 2021-05-26 RX ADMIN — HEPARIN SODIUM 5000 UNIT(S): 5000 INJECTION INTRAVENOUS; SUBCUTANEOUS at 05:27

## 2021-05-26 RX ADMIN — SENNA PLUS 2 TABLET(S): 8.6 TABLET ORAL at 22:10

## 2021-05-26 RX ADMIN — Medication 975 MILLIGRAM(S): at 05:26

## 2021-05-26 RX ADMIN — HEPARIN SODIUM 5000 UNIT(S): 5000 INJECTION INTRAVENOUS; SUBCUTANEOUS at 13:16

## 2021-05-26 RX ADMIN — SODIUM CHLORIDE 50 MILLILITER(S): 9 INJECTION, SOLUTION INTRAVENOUS at 13:17

## 2021-05-26 RX ADMIN — Medication 5 MILLIGRAM(S): at 10:26

## 2021-05-26 NOTE — DIETITIAN INITIAL EVALUATION ADULT. - CHIEF COMPLAINT
Per chart, "75 yr old male w/ recent diagnosis of stage IV gastric carcinoma sent from [facility] with noted outpt abnormal labs concerning for new onset acute renal failure."

## 2021-05-26 NOTE — DIETITIAN INITIAL EVALUATION ADULT. - ORAL INTAKE PTA/DIET HISTORY
Of note, RD interview limited as Pt very lethargic at time of visit.  Pt visited at bedside who reports poor PO intake x2 months PTA secondary to poor appetite, consuming <50% of baseline intake + x2 Ensure Enlive at home. Likely meeting <75% of needs >1 month.  Pt reports following a regular diet PTA. Denies any difficulties chewing or swallowing. Pt denies taking any vitamins or minerals PTA. Confirms NKFA.

## 2021-05-26 NOTE — DIETITIAN INITIAL EVALUATION ADULT. - REASON
Pt very lethargic at time of visit, falling asleep towards end of RD interview. RD observed overt visual signs of moderate muscle mass depletion to clavicles.

## 2021-05-26 NOTE — PROGRESS NOTE ADULT - ASSESSMENT
75 yr old male w/ recent diagnosis of stage IV gastric carcinoma sent from [facility] with abnormal labs, notably elevated sCr. Pt notes normal renal function about two weeks ago. Notes he was also recently in ED earlier this week where he was really dehdrated and received some IVF (?500 cc NS) and then sent home. Notes he had decreased urine output that day but has otherwise not noted a decrease in urine output at home. No blood in urine. Denies new medications including NSAIDS or antibiotics. Has had reduced oral intake in the last 3 weeks associated with lack of appetite. Currently w/ mild nausea and discomfort.  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 Plan to start chemo.    (20 May 2021 21:37)    Hematology/Oncology called to see patient who was seen in consultation by Dr. Kendall Arellano and [facility] [place] on 5/20/2021 for stage IV gastric cancer. History provided by Dr. Arellano as follows:  75-year-old male with HTN was experiencing abdominal pain, fatigue and weight loss of 10 pounds, with symptoms worsening over 6 week period  Patient saw PMD and was referred to gastroenterology 4/15/2021 CT A/P w/o con outside report – mesenteric, retroperitoneal, pelvic adenopathy concerning for malignancy, innumerable hyper and hypodense cysts throughout kidneys demonstrating peripheral calcifications, compatible with polycystic kidney disease. Multiple lytic and sclerotic lesions throughout sacrum and to lesser extent pelvis concerning for metastasis.  Prostatomegaly. Hypoattenuating lesions in liver may represent cysts, however metastases cannot be entirely excluded  5/5/2021 PET/CT [facility] outside report - linear segment of hypermetabolism associated with lesser curvature of mid stomach measures 4 x 1cm, SUV 6.0; numerous hypo and  hyperdense cysts throughout bilateral kidneys, multiple hypodense lesions in liver consistent with hepatic cysts; FDG avid posterior mediastinal lymph node mass adjacent to mid esophagus, SUV 5.0, 2.1 x 1.9cm. Numerous FDG avid lymph nodes throughout abdomen and pelvis for example right external iliac node, SUV 5.1, 1.7 x 1.4cm. Inactive left thyroid nodule, lack of uptake favors adenoma. No aggressive osseous lesions seen.  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 Avelina Chaudhry of [facility] who informed patient of stage IV gastric cancer diagnosis, palliative intent of treatment and recommended DOF + Herceptin (docetaxel 70mg/m2 d1, oxaliplatin 130mg/m2, IVCI fluorouracil 750mg/m2 d1-5, trastuzumab 8mg/kg loading dose then 6 mg/kg maintenance every 3 weeks as part of 21 day schedule)  As of day of appointment, patient had not signed release of information to request pathology and imaging review at [facility], [facility] review not yet available  Patient reports he feels well, is urinating less. Patient notes that he had eGFR >100 last week but within a few days had dropped significantly with no cause known.    Stage IV Gastric Cancer  --Seen by Dr. Kendall Arellano at [facility] [place]  --Treatment per Dr. Arellano  --S/P echocardiogram per Dr. Arellano's request prior to chemo    Acute Kidney Injury  --Nephrology Consult appreciated; recommended IVF and USG to assess for hydronephrosis   --Cr more or less stable; 3.71  --ATN vs. ARB induced vs. hydronephrosis ;  noted US to have increased renal cortical echogenicity bilaterally which may be secondary to medical renal disease; mild to moderate left hydronephrosis with echogenic material noted within the left renal collecting system.   --S/P "Lasix" Scan to assess renal function   --Urine Eosinophils positive  --CT shows mild/moderate left hydronephrosis  --Appreciate  evaluation  --Patient under evaluation - may need stent placement or Nephrostomy tube    We will be happy to answer any onc questions on behalf of [facility] and will be in touch with them.       Josr Nunes PA-C  Hematology/Oncology  [facility]   [phone] (cell)  [phone] (office)  [phone] (alt office)  Evenings and weekends please call MD on call or office

## 2021-05-26 NOTE — PROGRESS NOTE ADULT - SUBJECTIVE AND OBJECTIVE BOX
Patient is a 75y old  Male who presents with a chief complaint of GIOVANNI (26 May 2021 08:57)    Patient seen this morning. Reporting abdominal pain this morning. Nausea is under control.    MEDICATIONS  (STANDING):  amLODIPine   Tablet 5 milliGRAM(s) Oral daily  heparin   Injectable 5000 Unit(s) SubCutaneous every 8 hours  lactated ringers. 1000 milliLiter(s) (50 mL/Hr) IV Continuous <Continuous>  latanoprost 0.005% Ophthalmic Solution 1 Drop(s) Both EYES at bedtime  polyethylene glycol 3350 17 Gram(s) Oral daily  senna 2 Tablet(s) Oral at bedtime    MEDICATIONS  (PRN):  acetaminophen   Tablet .. 975 milliGRAM(s) Oral every 8 hours PRN Mild Pain (1 - 3), Moderate Pain (4 - 6)  metoclopramide Injectable 5 milliGRAM(s) IV Push every 8 hours PRN nausea/vomitting    Vital Signs Last 24 Hrs  T(C): 36.7 (26 May 2021 04:54), Max: 37.5 (25 May 2021 20:40)  T(F): 98.1 (26 May 2021 04:54), Max: 99.5 (25 May 2021 20:40)  HR: 67 (26 May 2021 04:54) (67 - 79)  BP: 134/80 (26 May 2021 04:54) (126/72 - 176/75)  BP(mean): --  RR: 18 (26 May 2021 04:54) (17 - 18)  SpO2: 96% (26 May 2021 04:54) (96% - 100%)    PE  NAD  Awake, alert  Anicteric  No rash grossly                            11.3   8.17  )-----------( 197      ( 25 May 2021 06:47 )             34.2       05-25    144  |  112<H>  |  29<H>  ----------------------------<  71  4.2   |  18<L>  |  3.71<H>    Ca    9.3      25 May 2021 06:47        EXAM:  NM KIDNEY IMG LASIX                                PROCEDURE DATE:  05/25/2021          INTERPRETATION:  RADIOPHARMACEUTICAL: 10.2 mCi [id number] (MAG3), I.V.    CLINICAL STATEMENT: 75-year-old male with acute kidney insufficiency of unclear etiology referred to evaluate for obstruction and renal function.    TECHNIQUE: Patient arrived at the division with indwelling Ramon catheter in place. Patient was hydrated with 700 mL of normal saline solution intravenously in one hour. Dynamic images of the posterior abdomen were obtained for 46 minutes following administration of radiopharmaceutical.  Lasix 40 mg I.V. was administered at 24 minutes post-injection.    FINDINGS:  The renal perfusion images demonstrate markedly delayed and diminished flow to both kidneys, worse on the left.    The kidneys are symmetric in size, with the left kidney larger than the right.    The functional images show diminished cortical uptake by both kidneys, worse on the larger left kidney. There is delayed cortical transit bilaterally.    There are persistent photopenic areas along the lateral and superior aspects of the left kidney. There is continued accumulation of activity in the left collecting system with no evidence of drainage before and after the administration of Lasix, compatible with obstruction.    There are also persistent photopenic areas in the superomedial and inferolateral aspects of the right kidney. There is continued accumulation of activity in the right collecting system prior to Lasix. After Lasix, washout half time from the right kidney is 15 minutes (normal is 10 minutes or less). This is equivocal for obstruction.    Differential renal function: Right kidney 60 %; left kidney 40 %.    IMPRESSION: Abnormal diuretic renal scan.    Obstructed larger left kidney with delayed and diminished flow and diminished function.    Delayed and diminished flow to, and diminished function of the right kidney with equivocal response to diuretic challenge. Low-grade or partial obstruction cannot be excluded.    Persistent photopenic areas in bilateral kidneys may be secondary to cysts or masses.    Differential renal function: Right kidney 60 %; left kidney 40 %.      Patient name: CHAO MINER  YOB: 1945   Age: 75 (M)   MR#: [id number]  Study Date: 5/26/2021  Location: [address] [place], [place] [id number]: Amie Lino RDCS  Study quality: Technically fair  Referring Physician: Gene Mullins MD  Blood Pressure: 134/90 mmHg  Height: 170 cm  Weight: 70 kg  BSA: 1.8 m2  ------------------------------------------------------------------------  PROCEDURE: Transthoracic echocardiogram with 2-D, M-Mode  and complete spectral and color flow Doppler. Strain  Imaging.  INDICATION: Encounter for antineoplastic chemotherapy  (Z51.11)  ------------------------------------------------------------------------  Dimensions:    Normal Values:  LA:     3.7    2.0 - 4.0 cm  Ao:     3.3    2.0 - 3.8 cm  SEPTUM: 1.2    0.6 - 1.2 cm  PWT:    1.0    0.6 - 1.1 cm  LVIDd:  4.0    3.0 - 5.6 cm  LVIDs:         1.8 - 4.0 cm  Derived variables:  LVMI: 80 g/m2  RWT: 0.50  EF (Neff Rule): 70 %  ------------------------------------------------------------------------  Observations:  Mitral Valve: Normal mitral valve. Minimal mitral  regurgitation.  Aortic Valve/Aorta: Normal trileaflet aortic valve.  Aortic Root: 3.3 cm.  Left Atrium: Normal left atrium.  LA volume index = 24  cc/m2.  Left Ventricle: Normal left ventricular systolic function.  No segmental wall motion abnormalities. Increased relative  wall thickness with normal left ventricular mass index,  consistent with concentric left ventricular remodeling.  Mild diastolic dysfunction (Stage I).  Right Heart: Normal right atrium. Normal right ventricular  size and function. Normal tricuspid valve. Minimal  tricuspid regurgitation. Normal pulmonic valve.  Pericardium/Pleura: Normal pericardium with no pericardial  effusion.  Hemodynamic: Estimated right atrial pressure is 8 mm Hg.  Estimated right ventricular systolic pressure equals 37 mm  Hg, assuming right atrial pressure equals 8 mm Hg,  consistent with borderline pulmonary hypertension.  ------------------------------------------------------------------------  Conclusions:  1. Increased relative wall thickness with normal left  ventricular mass index, consistent with concentric left  ventricular remodeling.  2. Normal left ventricular systolic function. No segmental  wall motion abnormalities.  3. Global Longitudinal Strain -23.5%. (GE, Hr 62, BP  134/90)  4. Mild diastolic dysfunction (Stage I).  5. Estimated pulmonary artery systolic pressure equals 37  mm Hg, assuming right atrial pressure equals 8 mm Hg,  consistent with borderline pulmonary pressures.  *** No previous Echo exam.

## 2021-05-26 NOTE — DIETITIAN INITIAL EVALUATION ADULT. - ADD RECOMMEND
1. Continue Regular Diet as ordered; monitor need for renal restrictions 2. Monitor if Pt amenable to receive nutritional oral supplementation 3. Adjustments to bowel regimen as appropriate 4. Monitor weight trends, PO intake, GI function, electrolytes, and skin integrity

## 2021-05-26 NOTE — DIETITIAN INITIAL EVALUATION ADULT. - PROBLEM SELECTOR PLAN 1
may have component of dehydration/pre renal but unclear cause of rapid decline; pt denies any new medications; oliguric   - obtain urine lytes and Renal US  - s/p morelia peoples, strict In/Out q3h  - will start IVF: LR at 125 cc/hr  - avoid nephrotoxic agents  - Nephro consult

## 2021-05-26 NOTE — PROGRESS NOTE ADULT - ASSESSMENT
75 yr old male w/ recent diagnosis of stage IV gastric carcinoma sent from [facility] with noted outpt abnormal labs concerning for new onset acute renal failure.     Problem/Plan - 1:  ·  Problem: Acute kidney failure.  Plan: may have component of dehydration/pre renal but unclear cause of rapid decline; pt denies any new medications  - Renal US:< from: US Kidney and Bladder (05.22.21 @ 17:34) >  Increased renal cortical echogenicity bilaterally which may be secondary to medical renal disease.  Multiple bilateral renal cysts, some of which are complex. In particular, a cyst in the left kidney demonstrates peripheral calcification and internal echogenic material of uncertain etiology.  Mild to moderate left hydronephrosis with echogenic material noted within the left renal collecting system. Nonobstructing right intrarenal calculi.  - avoid nephrotoxic agents  - Cr now slwoly improving .. no obvious etiology .. eosinophils positive  : will dw neph  - discussed with urology : CT noted   called and discussed with nephro and urology ...  hydronehprosis mild to moderate and doubt it is the etiology of GIOVANNI however will check lasix scan , and cont lundy for other etiologies of GIOVANNI     Problem/Plan - 2:  ·  Problem: Gastric cancer.  Plan: Recent diagnosis, per pt, known metastasis to peritoneal lymph nodes  Heme onc following, appreciate recs  pain control- tylenol 975 mg TID for mild-mod pain, will add dilaudid po for severe pain  nausea - zofran 4 mg q6h prn.     Problem/Plan - 3:  ·  Problem: HTN (hypertension).  Plan: will start Norvasc     Problem/Plan - 4:  ·  Problem: back pain : sec to ruptured ? cyst   improved   no neuro deificits  75 yr old male w/ recent diagnosis of stage IV gastric carcinoma sent from [facility] with noted outpt abnormal labs concerning for new onset acute renal failure.     Problem/Plan - 1:  ·  Problem: Acute kidney failure.  Plan: may have component of dehydration/pre renal but unclear cause of rapid decline; pt denies any new medications  - Renal US:< from: US Kidney and Bladder (05.22.21 @ 17:34) >  Increased renal cortical echogenicity bilaterally which may be secondary to medical renal disease.  Multiple bilateral renal cysts, some of which are complex. In particular, a cyst in the left kidney demonstrates peripheral calcification and internal echogenic material of uncertain etiology.  Mild to moderate left hydronephrosis with echogenic material noted within the left renal collecting system. Nonobstructing right intrarenal calculi.  - avoid nephrotoxic agents  - Cr now slwoly improving .. no obvious etiology .. eosinophils positive  : will dw neph  - reviewed CT and lasix scan findings with urology Dr. Carrillo : planned OR on friday for cystoscopy/ureteroscopy with stenting ( ? possible B)   - cont current management     Problem/Plan - 2:  ·  Problem: Gastric cancer.  Plan: Recent diagnosis, per pt, known metastasis to peritoneal lymph nodes  Heme onc following, appreciate recs  pain control- tylenol 975 mg TID for mild-mod pain, will add dilaudid po for severe pain  nausea - zofran 4 mg q6h prn.     Problem/Plan - 3:  ·  Problem: HTN (hypertension).  Plan: will start Norvasc     Problem/Plan - 4:  ·  Problem: back pain : sec to ruptured ? cyst   improved   no neuro deificits

## 2021-05-26 NOTE — DIETITIAN INITIAL EVALUATION ADULT. - OTHER CALCULATIONS
estimated nutrient needs based on admission/dosing weight of 69.9 Kg with considerations for malnutrition; fluids per team

## 2021-05-26 NOTE — DIETITIAN INITIAL EVALUATION ADULT. - PERTINENT MEDS FT
MEDICATIONS  (STANDING):  amLODIPine   Tablet 5 milliGRAM(s) Oral daily  heparin   Injectable 5000 Unit(s) SubCutaneous every 8 hours  lactated ringers. 1000 milliLiter(s) (50 mL/Hr) IV Continuous <Continuous>  latanoprost 0.005% Ophthalmic Solution 1 Drop(s) Both EYES at bedtime  polyethylene glycol 3350 17 Gram(s) Oral daily  senna 2 Tablet(s) Oral at bedtime    MEDICATIONS  (PRN):  acetaminophen   Tablet .. 975 milliGRAM(s) Oral every 8 hours PRN Mild Pain (1 - 3), Moderate Pain (4 - 6)  metoclopramide Injectable 5 milliGRAM(s) IV Push every 8 hours PRN nausea/vomitting

## 2021-05-26 NOTE — PROGRESS NOTE ADULT - SUBJECTIVE AND OBJECTIVE BOX
The patient has an elevated creatinine likely secondary to GIOVANNI.  MIld hydronephrosis. Creatinine stable. Urine clear.    Vital Signs Last 24 Hrs  T(C): 36.7 (26 May 2021 04:54), Max: 37.5 (25 May 2021 20:40)  T(F): 98.1 (26 May 2021 04:54), Max: 99.5 (25 May 2021 20:40)  HR: 67 (26 May 2021 04:54) (67 - 79)  BP: 134/80 (26 May 2021 04:54) (126/72 - 176/75)  BP(mean): --  RR: 18 (26 May 2021 04:54) (17 - 18)  SpO2: 96% (26 May 2021 04:54) (96% - 100%)    PHYSICAL EXAM:    NAD, well-developed  Abd: soft, NT/ND, No CVAT    Urine:     I&O's Summary    25 May 2021 07:01  -  26 May 2021 07:00  --------------------------------------------------------  IN: 650 mL / OUT: 2600 mL / NET: -1950 mL        LABS:                        11.3   8.17  )-----------( 197      ( 25 May 2021 06:47 )             34.2     05-25    144  |  112<H>  |  29<H>  ----------------------------<  71  4.2   |  18<L>  |  3.71<H>    Ca    9.3      25 May 2021 06:47      Urinalysis Basic - ( 25 May 2021 07:49 )    Color: Light Yellow / Appearance: Clear / S.014 / pH: x  Gluc: x / Ketone: Small  / Bili: Negative / Urobili: Negative   Blood: x / Protein: Trace / Nitrite: Negative   Leuk Esterase: Large / RBC: 11 /hpf / WBC 7 /HPF   Sq Epi: x / Non Sq Epi: 0 /hpf / Bacteria: Negative        Prior notes/chart reviewed.        Renal scan with lasix washout pending to rule out obstruction.   Monitor creatinine.  No  intervention at this time.      Office: [phone] The patient has an elevated creatinine likely secondary to GIOVANNI.  MIld hydronephrosis. Creatinine stable. Urine clear.    Vital Signs Last 24 Hrs  T(C): 36.7 (26 May 2021 04:54), Max: 37.5 (25 May 2021 20:40)  T(F): 98.1 (26 May 2021 04:54), Max: 99.5 (25 May 2021 20:40)  HR: 67 (26 May 2021 04:54) (67 - 79)  BP: 134/80 (26 May 2021 04:54) (126/72 - 176/75)  BP(mean): --  RR: 18 (26 May 2021 04:54) (17 - 18)  SpO2: 96% (26 May 2021 04:54) (96% - 100%)    PHYSICAL EXAM:    NAD, well-developed  Abd: soft, NT/ND, No CVAT    Urine:     I&O's Summary    25 May 2021 07:01  -  26 May 2021 07:00  --------------------------------------------------------  IN: 650 mL / OUT: 2600 mL / NET: -1950 mL        LABS:                        11.3   8.17  )-----------( 197      ( 25 May 2021 06:47 )             34.2     05-25    144  |  112<H>  |  29<H>  ----------------------------<  71  4.2   |  18<L>  |  3.71<H>    Ca    9.3      25 May 2021 06:47      Urinalysis Basic - ( 25 May 2021 07:49 )    Color: Light Yellow / Appearance: Clear / S.014 / pH: x  Gluc: x / Ketone: Small  / Bili: Negative / Urobili: Negative   Blood: x / Protein: Trace / Nitrite: Negative   Leuk Esterase: Large / RBC: 11 /hpf / WBC 7 /HPF   Sq Epi: x / Non Sq Epi: 0 /hpf / Bacteria: Negative        Prior notes/chart reviewed.    CT  reveals evidence of left distal obstruction. Extrinsic v. Intrinsic ??  Obstructive pattern on renal scan.  Discussed with Dr. Mullins, will plan retrograde pyelogram, possible ureteroscopy and stent. If unable to access ureter will need nephrostomy tube.       Office: [phone]

## 2021-05-26 NOTE — PROGRESS NOTE ADULT - SUBJECTIVE AND OBJECTIVE BOX
Date of service: 05-26-21 @ 22:41      Patient is a 75y old  Male who presents with a chief complaint of GIOVANNI (26 May 2021 15:44)                                                               INTERVAL HPI/OVERNIGHT EVENTS:    REVIEW OF SYSTEMS:     CONSTITUTIONAL: No weakness, fevers or chills  EYES/ENT: No visual changes , no ear ache   NECK: No pain or stiffness  RESPIRATORY: No cough, wheezing,  No shortness of breath  CARDIOVASCULAR: No chest pain or palpitations  GASTROINTESTINAL: No abdominal pain  . No nausea, vomiting, or hematemesis; No diarrhea or constipation. No melena or hematochezia.  GENITOURINARY: No dysuria, frequency or hematuria  NEUROLOGICAL: No numbness or weakness  SKIN: No itching, burning, rashes, or lesions                                                                                                                                                                                                                                                                                 Medications:  MEDICATIONS  (STANDING):  amLODIPine   Tablet 5 milliGRAM(s) Oral daily  heparin   Injectable 5000 Unit(s) SubCutaneous every 8 hours  lactated ringers. 1000 milliLiter(s) (50 mL/Hr) IV Continuous <Continuous>  latanoprost 0.005% Ophthalmic Solution 1 Drop(s) Both EYES at bedtime  polyethylene glycol 3350 17 Gram(s) Oral daily  senna 2 Tablet(s) Oral at bedtime    MEDICATIONS  (PRN):  acetaminophen   Tablet .. 975 milliGRAM(s) Oral every 8 hours PRN Mild Pain (1 - 3), Moderate Pain (4 - 6)  metoclopramide Injectable 5 milliGRAM(s) IV Push every 8 hours PRN nausea/vomitting       Allergies    aspirin (Rash)    Intolerances      Vital Signs Last 24 Hrs  T(C): 36.9 (26 May 2021 21:27), Max: 36.9 (26 May 2021 21:27)  T(F): 98.4 (26 May 2021 21:27), Max: 98.4 (26 May 2021 21:27)  HR: 69 (26 May 2021 21:27) (67 - 71)  BP: 155/81 (26 May 2021 21:27) (134/80 - 165/78)  BP(mean): --  RR: 17 (26 May 2021 21:27) (17 - 18)  SpO2: 97% (26 May 2021 21:27) (96% - 97%)  CAPILLARY BLOOD GLUCOSE          05-25 @ 07:01  -  05-26 @ 07:00  --------------------------------------------------------  IN: 650 mL / OUT: 2600 mL / NET: -1950 mL    05-26 @ 07:01  -  05-26 @ 22:41  --------------------------------------------------------  IN: 1110 mL / OUT: 950 mL / NET: 160 mL      Physical Exam:    Daily     Daily   General:  Well appearing, NAD, not cachetic  HEENT:  Nonicteric, PERRLA  CV:  RRR, S1S2   Lungs:  CTA B/L, no wheezes, rales, rhonchi  Abdomen:  Soft, non-tender, no distended, positive BS  Extremities:  2+ pulses, no c/c, no edema  Skin:  Warm and dry, no rashes  :  No thibodeaux  Neuro:  AAOx3, non-focal, grossly intact                                                                                                                                                                                                                                                                                                LABS:                               11.3   8.17  )-----------( 197      ( 25 May 2021 06:47 )             34.2                      05-26    145  |  112<H>  |  31<H>  ----------------------------<  85  4.5   |  19<L>  |  3.75<H>    Ca    9.7      26 May 2021 10:48    TPro  5.2<L>  /  Alb  2.6<L>  /  TBili  x   /  DBili  x   /  AST  x   /  ALT  x   /  AlkPhos  x   05-26                       RADIOLOGY & ADDITIONAL TESTS         I personally reviewed: [  ]EKG   [  ]CXR    [  ] CT      A/P:         Discussed with :     [facility] consultants' Notes   Time spent :   Date of service: 05-26-21 @ 22:41      Patient is a 75y old  Male who presents with a chief complaint of GIOVANNI (26 May 2021 15:44)                                                               INTERVAL HPI/OVERNIGHT EVENTS:    REVIEW OF SYSTEMS:     CONSTITUTIONAL: No weakness, fevers or chills  EYES/ENT: No visual changes , no ear ache   NECK: No pain or stiffness  RESPIRATORY: No cough, wheezing,  No shortness of breath  CARDIOVASCULAR: No chest pain or palpitations  GASTROINTESTINAL: No abdominal pain  . No nausea, vomiting, or hematemesis; No diarrhea or constipation. No melena or hematochezia.  GENITOURINARY: No dysuria, frequency or hematuria  NEUROLOGICAL: No numbness or weakness                                                                                                                                                                                                                                                                                Medications:  MEDICATIONS  (STANDING):  amLODIPine   Tablet 5 milliGRAM(s) Oral daily  heparin   Injectable 5000 Unit(s) SubCutaneous every 8 hours  lactated ringers. 1000 milliLiter(s) (50 mL/Hr) IV Continuous <Continuous>  latanoprost 0.005% Ophthalmic Solution 1 Drop(s) Both EYES at bedtime  polyethylene glycol 3350 17 Gram(s) Oral daily  senna 2 Tablet(s) Oral at bedtime    MEDICATIONS  (PRN):  acetaminophen   Tablet .. 975 milliGRAM(s) Oral every 8 hours PRN Mild Pain (1 - 3), Moderate Pain (4 - 6)  metoclopramide Injectable 5 milliGRAM(s) IV Push every 8 hours PRN nausea/vomitting       Allergies    aspirin (Rash)    Intolerances      Vital Signs Last 24 Hrs  T(C): 36.9 (26 May 2021 21:27), Max: 36.9 (26 May 2021 21:27)  T(F): 98.4 (26 May 2021 21:27), Max: 98.4 (26 May 2021 21:27)  HR: 69 (26 May 2021 21:27) (67 - 71)  BP: 155/81 (26 May 2021 21:27) (134/80 - 165/78)  BP(mean): --  RR: 17 (26 May 2021 21:27) (17 - 18)  SpO2: 97% (26 May 2021 21:27) (96% - 97%)  CAPILLARY BLOOD GLUCOSE          05-25 @ 07:01  -  05-26 @ 07:00  --------------------------------------------------------  IN: 650 mL / OUT: 2600 mL / NET: -1950 mL    05-26 @ 07:01  -  05-26 @ 22:41  --------------------------------------------------------  IN: 1110 mL / OUT: 950 mL / NET: 160 mL      Physical Exam:    Daily     Daily   General:  Well appearing, NAD, not cachetic  HEENT:  Nonicteric, PERRLA  CV:  RRR, S1S2   Lungs:  CTA B/L, no wheezes, rales, rhonchi  Abdomen:  Soft, non-tender, no distended, positive BS  Extremities:  no edema   thibodeaux in place   Neuro:  AAOx3, non-focal, grossly intact                                                                                                                                                                                                                                                                                                LABS:                               11.3   8.17  )-----------( 197      ( 25 May 2021 06:47 )             34.2                      05-26    145  |  112<H>  |  31<H>  ----------------------------<  85  4.5   |  19<L>  |  3.75<H>    Ca    9.7      26 May 2021 10:48    TPro  5.2<L>  /  Alb  2.6<L>  /  TBili  x   /  DBili  x   /  AST  x   /  ALT  x   /  AlkPhos  x   05-26                       RADIOLOGY & ADDITIONAL TESTS         I personally reviewed: [  ]EKG   [  ]CXR    [  ] CT      A/P:         Discussed with :     [facility] consultants' Notes   Time spent :

## 2021-05-26 NOTE — DIETITIAN INITIAL EVALUATION ADULT. - OTHER INFO
Pt reports poor appetite in-house, however trying to maintain adequate PO intake; ?RN flowsheets documenting 5-99% of meal completion. Pt declined need for nutritional oral supplementation in-house, states that he would like to meet nutritional needs from food. Will continue to monitor. Pt made aware RD remains available.    GI: Pt with complaints of nausea; receiving zofran PRN. Pt states last BM x2 days ago; not actively on bowel regimen.    Current dosing weight: 69.9 Kg (5/20)  Pt reports UBW 72.3 Kg x2 months ago  [facility]  -- Per Pt report, 2.4 Kg (3.4%) weight loss x2 months; notable weight loss, however not clinically significant per ASPEN guidelines.

## 2021-05-27 ENCOUNTER — TRANSCRIPTION ENCOUNTER (OUTPATIENT)
Age: 76
End: 2021-05-27

## 2021-05-27 LAB
ANION GAP SERPL CALC-SCNC: 15 MMOL/L — SIGNIFICANT CHANGE UP (ref 5–17)
BUN SERPL-MCNC: 31 MG/DL — HIGH (ref 7–23)
CALCIUM SERPL-MCNC: 9.6 MG/DL — SIGNIFICANT CHANGE UP (ref 8.4–10.5)
CHLORIDE SERPL-SCNC: 111 MMOL/L — HIGH (ref 96–108)
CO2 SERPL-SCNC: 19 MMOL/L — LOW (ref 22–31)
CREAT SERPL-MCNC: 3.9 MG/DL — HIGH (ref 0.5–1.3)
GBM IGG SER-ACNC: 3 — SIGNIFICANT CHANGE UP (ref 0–20)
GLUCOSE SERPL-MCNC: 78 MG/DL — SIGNIFICANT CHANGE UP (ref 70–99)
HCT VFR BLD CALC: 33.5 % — LOW (ref 39–50)
HGB BLD-MCNC: 11 G/DL — LOW (ref 13–17)
KAPPA LC SER QL IFE: 4.9 MG/DL — HIGH (ref 0.33–1.94)
KAPPA/LAMBDA FREE LIGHT CHAIN RATIO, SERUM: 1.8 RATIO — HIGH (ref 0.26–1.65)
LAMBDA LC SER QL IFE: 2.72 MG/DL — HIGH (ref 0.57–2.63)
MCHC RBC-ENTMCNC: 28.1 PG — SIGNIFICANT CHANGE UP (ref 27–34)
MCHC RBC-ENTMCNC: 32.8 GM/DL — SIGNIFICANT CHANGE UP (ref 32–36)
MCV RBC AUTO: 85.7 FL — SIGNIFICANT CHANGE UP (ref 80–100)
MYELOPEROXIDASE AB SER-ACNC: <5 UNITS — SIGNIFICANT CHANGE UP
MYELOPEROXIDASE CELLS FLD QL: NEGATIVE — SIGNIFICANT CHANGE UP
NRBC # BLD: 0 /100 WBCS — SIGNIFICANT CHANGE UP (ref 0–0)
PLATELET # BLD AUTO: 248 K/UL — SIGNIFICANT CHANGE UP (ref 150–400)
POTASSIUM SERPL-MCNC: 4.3 MMOL/L — SIGNIFICANT CHANGE UP (ref 3.5–5.3)
POTASSIUM SERPL-SCNC: 4.3 MMOL/L — SIGNIFICANT CHANGE UP (ref 3.5–5.3)
PROTEINASE3 AB FLD-ACNC: 5.1 UNITS — SIGNIFICANT CHANGE UP
PROTEINASE3 AB SER-ACNC: NEGATIVE — SIGNIFICANT CHANGE UP
RBC # BLD: 3.91 M/UL — LOW (ref 4.2–5.8)
RBC # FLD: 15.6 % — HIGH (ref 10.3–14.5)
SODIUM SERPL-SCNC: 145 MMOL/L — SIGNIFICANT CHANGE UP (ref 135–145)
WBC # BLD: 6.87 K/UL — SIGNIFICANT CHANGE UP (ref 3.8–10.5)
WBC # FLD AUTO: 6.87 K/UL — SIGNIFICANT CHANGE UP (ref 3.8–10.5)

## 2021-05-27 PROCEDURE — 99233 SBSQ HOSP IP/OBS HIGH 50: CPT | Mod: GC

## 2021-05-27 RX ORDER — POLYETHYLENE GLYCOL 3350 17 G/17G
17 POWDER, FOR SOLUTION ORAL ONCE
Refills: 0 | Status: COMPLETED | OUTPATIENT
Start: 2021-05-27 | End: 2021-05-27

## 2021-05-27 RX ORDER — METOCLOPRAMIDE HCL 10 MG
5 TABLET ORAL THREE TIMES A DAY
Refills: 0 | Status: DISCONTINUED | OUTPATIENT
Start: 2021-05-27 | End: 2021-05-27

## 2021-05-27 RX ORDER — METOCLOPRAMIDE HCL 10 MG
5 TABLET ORAL THREE TIMES A DAY
Refills: 0 | Status: DISCONTINUED | OUTPATIENT
Start: 2021-05-27 | End: 2021-05-28

## 2021-05-27 RX ORDER — PROCHLORPERAZINE MALEATE 5 MG
5 TABLET ORAL THREE TIMES A DAY
Refills: 0 | Status: DISCONTINUED | OUTPATIENT
Start: 2021-05-27 | End: 2021-05-27

## 2021-05-27 RX ORDER — SIMETHICONE 80 MG/1
80 TABLET, CHEWABLE ORAL EVERY 6 HOURS
Refills: 0 | Status: DISCONTINUED | OUTPATIENT
Start: 2021-05-27 | End: 2021-05-28

## 2021-05-27 RX ORDER — METOCLOPRAMIDE HCL 10 MG
5 TABLET ORAL EVERY 8 HOURS
Refills: 0 | Status: DISCONTINUED | OUTPATIENT
Start: 2021-05-27 | End: 2021-05-27

## 2021-05-27 RX ADMIN — POLYETHYLENE GLYCOL 3350 17 GRAM(S): 17 POWDER, FOR SOLUTION ORAL at 05:46

## 2021-05-27 RX ADMIN — Medication 5 MILLIGRAM(S): at 23:00

## 2021-05-27 RX ADMIN — AMLODIPINE BESYLATE 5 MILLIGRAM(S): 2.5 TABLET ORAL at 05:17

## 2021-05-27 RX ADMIN — HEPARIN SODIUM 5000 UNIT(S): 5000 INJECTION INTRAVENOUS; SUBCUTANEOUS at 23:00

## 2021-05-27 RX ADMIN — SODIUM CHLORIDE 50 MILLILITER(S): 9 INJECTION, SOLUTION INTRAVENOUS at 14:33

## 2021-05-27 RX ADMIN — HEPARIN SODIUM 5000 UNIT(S): 5000 INJECTION INTRAVENOUS; SUBCUTANEOUS at 05:18

## 2021-05-27 RX ADMIN — HEPARIN SODIUM 5000 UNIT(S): 5000 INJECTION INTRAVENOUS; SUBCUTANEOUS at 14:15

## 2021-05-27 RX ADMIN — Medication 5 MILLIGRAM(S): at 05:18

## 2021-05-27 RX ADMIN — Medication 5 MILLIGRAM(S): at 14:35

## 2021-05-27 RX ADMIN — LATANOPROST 1 DROP(S): 0.05 SOLUTION/ DROPS OPHTHALMIC; TOPICAL at 22:59

## 2021-05-27 NOTE — PROGRESS NOTE ADULT - SUBJECTIVE AND OBJECTIVE BOX
[facility] DIVISION OF KIDNEY DISEASES AND HYPERTENSION -- FOLLOW UP NOTE  --------------------------------------------------------------------------------  Trent Rosales   Nephrology Fellow  Pager NS: [phone]/ [facility]: [phone]  (After 5 pm or on weekends please page the on-call fellow, can view the schedule on [facility]. Login is kita lr, schedule under [facility] medicine, psych, derm.      Patient is a 75y old  Male who presents with a chief complaint of GIOVANNI (27 May 2021 11:11)      24 hour events/subjective: Patient seen and examined at the bedside. Vital signs, labs, medications reviewed. Still with bloating/nausea, Cr 3.9mg/dl, non-oliguric.         PAST HISTORY  --------------------------------------------------------------------------------  No significant changes to PMH, PSH, FHx, SHx, unless otherwise noted    ALLERGIES & MEDICATIONS  --------------------------------------------------------------------------------  Allergies    aspirin (Rash)    Intolerances      Standing Inpatient Medications  amLODIPine   Tablet 5 milliGRAM(s) Oral daily  heparin   Injectable 5000 Unit(s) SubCutaneous every 8 hours  lactated ringers. 1000 milliLiter(s) IV Continuous <Continuous>  latanoprost 0.005% Ophthalmic Solution 1 Drop(s) Both EYES at bedtime  polyethylene glycol 3350 17 Gram(s) Oral daily  senna 2 Tablet(s) Oral at bedtime    PRN Inpatient Medications  acetaminophen   Tablet .. 975 milliGRAM(s) Oral every 8 hours PRN  metoclopramide Injectable 5 milliGRAM(s) IV Push every 8 hours PRN      REVIEW OF SYSTEMS  --------------------------------------------------------------------------------  Gen: No fevers/chills  Skin: No rashes  Head/Eyes/Ears: Normal hearing, no difficulty seeing  Respiratory: No dyspnea, cough  CV: No chest pain  GI: + bloating, nausea  : No dysuria, hematuria  MSK: No  edema  Heme: No easy bruising or bleeding  Psych: No significant depression    >>> <<<    VITALS/PHYSICAL EXAM  --------------------------------------------------------------------------------  T(C): 36.7 (05-27-21 @ 11:26), Max: 36.9 (05-26-21 @ 21:27)  HR: 69 (05-27-21 @ 11:26) (69 - 72)  BP: 160/82 (05-27-21 @ 11:26) (155/81 - 173/84)  RR: 17 (05-27-21 @ 11:26) (17 - 17)  SpO2: 98% (05-27-21 @ 11:26) (97% - 98%)  Wt(kg): --        05-26-21 @ 07:01  -  05-27-21 @ 07:00  --------------------------------------------------------  IN: 1710 mL / OUT: 1450 mL / NET: 260 mL    05-27-21 @ 07:01  -  05-27-21 @ 13:30  --------------------------------------------------------  IN: 120 mL / OUT: 0 mL / NET: 120 mL      Physical Exam:    	Gen: NAD  	HEENT: Anicteric  	Pulm: CTA B/L  	CV: S1S2  	Abd: Soft, +BS   	MSK: No LE edema B/L  	Neuro: Awake  	Skin: Warm and dry  	Vascular access:      LABS/STUDIES  --------------------------------------------------------------------------------              11.0   6.87  >-----------<  248      [05-27-21 @ 07:08]              33.5     145  |  111  |  31  ----------------------------<  78      [05-27-21 @ 07:08]  4.3   |  19  |  3.90        Ca     9.6     [05-27-21 @ 07:08]    TPro  5.2  /  Alb  2.6  /  TBili  x   /  DBili  x   /  AST  x   /  ALT  x   /  AlkPhos  x   [05-26-21 @ 08:40]          Creatinine Trend:  SCr 3.90 [05-27 @ 07:08]  SCr 3.75 [05-26 @ 10:48]  SCr 3.71 [05-25 @ 06:47]  SCr 3.65 [05-24 @ 07:14]  SCr 3.81 [05-23 @ 07:30]    Urinalysis - [05-25-21 @ 07:49]      Color Light Yellow / Appearance Clear / SG 1.014 / pH 6.0      Gluc Negative / Ketone Small  / Bili Negative / Urobili Negative       Blood Moderate / Protein Trace / Leuk Est Large / Nitrite Negative      RBC 11 / WBC 7 / Hyaline 0 / Gran  / Sq Epi  / Non Sq Epi 0 / Bacteria Negative    Urine Creatinine 107      [05-25-21 @ 07:50]  Urine Protein 18      [05-25-21 @ 07:50]  Urine Sodium 96      [05-25-21 @ 07:50]  Urine Potassium 23      [05-25-21 @ 07:50]  Urine Chloride 76      [05-25-21 @ 07:50]      HBsAg Nonreact      [05-25-21 @ 15:14]  HCV 0.84, Grayzone      [05-25-21 @ 15:14]    dsDNA <12      [05-25-21 @ 15:16]  C3 Complement 113      [05-25-21 @ 15:35]  C4 Complement 36      [05-25-21 @ 15:35]  anti-GBM 3      [05-25-21 @ 17:51]  Free Light Chains: kappa 4.90, lambda 2.72, ratio = 1.80      [05-26 @ 08:40]  Immunofixation Serum:   No Monoclonal Band Identified    Reference Range: None Detected      [05-26-21 @ 08:40]  SPEP Interpretation: Normal Electrophoresis Pattern      [05-26-21 @ 08:40]

## 2021-05-27 NOTE — PROGRESS NOTE ADULT - ASSESSMENT
75 yr old male w/ recent diagnosis of stage IV gastric carcinoma sent from [facility] with abnormal labs, notably elevated sCr. Pt notes normal renal function about two weeks ago. Notes he was also recently in ED earlier this week where he was really dehdrated and received some IVF (?500 cc NS) and then sent home. Notes he had decreased urine output that day but has otherwise not noted a decrease in urine output at home. No blood in urine. Denies new medications including NSAIDS or antibiotics. Has had reduced oral intake in the last 3 weeks associated with lack of appetite. Currently w/ mild nausea and discomfort.  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 Plan to start chemo.    (20 May 2021 21:37)    Hematology/Oncology called to see patient who was seen in consultation by Dr. Kendall Arellano and [facility] [place]/2021 for stage IV gastric cancer. History provided by Dr. Arellano as follows:  75-year-old male with HTN was experiencing abdominal pain, fatigue and weight loss of 10 pounds, with symptoms worsening over 6 week period  Patient saw PMD and was referred to gastroenterology 4/15/2021 CT A/P w/o con outside report – mesenteric, retroperitoneal, pelvic adenopathy concerning for malignancy, innumerable hyper and hypodense cysts throughout kidneys demonstrating peripheral calcifications, compatible with polycystic kidney disease. Multiple lytic and sclerotic lesions throughout sacrum and to lesser extent pelvis concerning for metastasis.  Prostatomegaly. Hypoattenuating lesions in liver may represent cysts, however metastases cannot be entirely excluded  5/5/2021 PET/CT [facility] outside report - linear segment of hypermetabolism associated with lesser curvature of mid stomach measures 4 x 1cm, SUV 6.0; numerous hypo and  hyperdense cysts throughout bilateral kidneys, multiple hypodense lesions in liver consistent with hepatic cysts; FDG avid posterior mediastinal lymph node mass adjacent to mid esophagus, SUV 5.0, 2.1 x 1.9cm. Numerous FDG avid lymph nodes throughout abdomen and pelvis for example right external iliac node, SUV 5.1, 1.7 x 1.4cm. Inactive left thyroid nodule, lack of uptake favors adenoma. No aggressive osseous lesions seen.  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 Avelina Chaudhry of [facility] who informed patient of stage IV gastric cancer diagnosis, palliative intent of treatment and recommended DOF + Herceptin (docetaxel 70mg/m2 d1, oxaliplatin 130mg/m2, IVCI fluorouracil 750mg/m2 d1-5, trastuzumab 8mg/kg loading dose then 6 mg/kg maintenance every 3 weeks as part of 21 day schedule)  As of day of appointment, patient had not signed release of information to request pathology and imaging review at [facility], [facility] review not yet available  Patient reports he feels well, is urinating less. Patient notes that he had eGFR >100 last week but within a few days had dropped significantly with no cause known.    Stage IV Gastric Cancer  --Se[place]. Kendall Arellano [place] [facility]  --Treatment per Dr. Arellano after optimization of renal function and discharge  --S/P echocardiogram per Dr. Arellano's request prior to chemo - normal LVEF    Acute Kidney Injury  --Nephrology Consult appreciated; recommended IVF and USG to assess for hydronephrosis   --Cr more or less stable; 3.90  --ATN vs. ARB induced vs. hydronephrosis ;  noted US to have increased renal cortical echogenicity bilaterally which may be secondary to medical renal disease; mild to moderate left hydronephrosis with echogenic material noted within the left renal collecting system.   --S/P "Lasix" Scan to assess renal function - consistent with obstructive process  --Urine Eosinophils positive  --CT shows mild/moderate left hydronephrosis  --Appreciate  evaluation  --May need stent placement or Nephrostomy tube    We will be happy to answer any onc questions on behalf of [facility] and will be in touch with them.       Josr Nunes PA-C  Hematology/Oncology  [facility]   [phone] (cell)  [phone] (office)  [phone] (alt office)  Evenings and weekends please call MD on call or office

## 2021-05-27 NOTE — PROGRESS NOTE ADULT - ASSESSMENT
75y M with gastric cancer presenting with acute renal failure    #GIOVANNI  - Pt reportedly had normal renal fxn a week ago, states his Cr was ~1mg/dl, now presenting with Cr of 4mg/dl in setting of poor Po intake and ? ARB use. Possibly now with ATN  - UA relatively bland, no significant proteinuria  - Cr stable at 3.6-3.9mg/dl  - Renal US findings noted, patient with multiple renal cysts b/l and likely underlying cystic disease but he does not have PCKD as his renal failure is acute, his Cr was .66mg/dl back on 5/14 and acutely eliot to 3.97mg/dl on 5/17 which would not be consistent with a hx of PCKD.  - Left renal collecting system with moderate hydronephrosis with echogenic material despite thibodeaux, appreciate urology recommendations, plan for retrograde pyelogram  - Agree with IVF, reduced to 50cc/hr given the bloating  - Would try to control pt's nausea, vomiting, bloating as that would help him have an oral intake and remove need for IVF.   - Discussed with the daughter/wife the possibility of a kidney biopsy for prognostication and diagnosis if no improvement noted. Thus far serological w/u unrevealing and no proteinuria noted    - Dose medications to eGFR<15 for now    #Hypernatremia  - Resolved, Na 144      #Metabolic acidosis  - NAGMA, 2/2 GIOVANNI  - Monitor bicarb for now, no indication for bicarb therapy at this time

## 2021-05-27 NOTE — PROGRESS NOTE ADULT - SUBJECTIVE AND OBJECTIVE BOX
Date of service: 05-27-21 @ 14:01      Patient is a 75y old  Male who presents with a chief complaint of GIOVANNI (27 May 2021 13:30)                                                               INTERVAL HPI/OVERNIGHT EVENTS:    REVIEW OF SYSTEMS:     CONSTITUTIONAL: No weakness, fevers or chills  EYES/ENT: No visual changes , no ear ache   NECK: No pain or stiffness  RESPIRATORY: No cough, wheezing,  No shortness of breath  CARDIOVASCULAR: No chest pain or palpitations  GASTROINTESTINAL: No abdominal pain  . Nausea but no vomkitting   had BM .  GENITOURINARY: No dysuria, frequency or hematuria  NEUROLOGICAL: No numbness or weakness                                                                                                                                                                                                                                                                                Medications:  MEDICATIONS  (STANDING):  amLODIPine   Tablet 5 milliGRAM(s) Oral daily  heparin   Injectable 5000 Unit(s) SubCutaneous every 8 hours  lactated ringers. 1000 milliLiter(s) (50 mL/Hr) IV Continuous <Continuous>  latanoprost 0.005% Ophthalmic Solution 1 Drop(s) Both EYES at bedtime  metoclopramide Injectable 5 milliGRAM(s) IV Push three times a day  polyethylene glycol 3350 17 Gram(s) Oral daily  senna 2 Tablet(s) Oral at bedtime    MEDICATIONS  (PRN):  acetaminophen   Tablet .. 975 milliGRAM(s) Oral every 8 hours PRN Mild Pain (1 - 3), Moderate Pain (4 - 6)  simethicone 80 milliGRAM(s) Chew every 6 hours PRN Gas       Allergies    aspirin (Rash)    Intolerances      Vital Signs Last 24 Hrs  T(C): 37 (27 May 2021 21:14), Max: 37 (27 May 2021 21:14)  T(F): 98.6 (27 May 2021 21:14), Max: 98.6 (27 May 2021 21:14)  HR: 85 (27 May 2021 21:14) (69 - 85)  BP: 163/78 (27 May 2021 21:14) (157/80 - 173/84)  BP(mean): --  RR: 18 (27 May 2021 21:14) (17 - 18)  SpO2: 94% (27 May 2021 21:14) (94% - 98%)  CAPILLARY BLOOD GLUCOSE          05-26 @ 07:01  -  05-27 @ 07:00  --------------------------------------------------------  IN: 1710 mL / OUT: 1450 mL / NET: 260 mL    05-27 @ 07:01  -  05-28 @ 04:01  --------------------------------------------------------  IN: 120 mL / OUT: 0 mL / NET: 120 mL      Physical Exam:    Daily     Daily   General:  Well appearing, NAD, not cachetic  HEENT:  Nonicteric, PERRLA  CV:  RRR, S1S2   Lungs:  CTA B/L, no wheezes, rales, rhonchi  Abdomen:  Soft, non-tender, no distended, positive BS  Extremities:  2+ pulses, no c/c, no edema  Skin:  Warm and dry, no rashes  :   morelia  Neuro:  AAOx3, non-focal, grossly intact                                                                                                                                                                                                                                                                                                LABS:                               11.0   6.87  )-----------( 248      ( 27 May 2021 07:08 )             33.5                      05-27    145  |  111<H>  |  31<H>  ----------------------------<  78  4.3   |  19<L>  |  3.90<H>    Ca    9.6      27 May 2021 07:08    TPro  5.2<L>  /  Alb  2.6<L>  /  TBili  x   /  DBili  x   /  AST  x   /  ALT  x   /  AlkPhos  x   05-26                       RADIOLOGY & ADDITIONAL TESTS         I personally reviewed: [  ]EKG   [  ]CXR    [  ] CT      A/P:         Discussed with :     [facility] consultants' Notes   Time spent :

## 2021-05-27 NOTE — PROGRESS NOTE ADULT - ASSESSMENT
75 yr old male w/ recent diagnosis of stage IV gastric carcinoma sent from [facility] with noted outpt abnormal labs concerning for new onset acute renal failure.     Problem/Plan - 1:  ·  Problem: Acute kidney failure.  Plan: may have component of dehydration/pre renal but unclear cause of rapid decline; pt denies any new medications  - Renal US:< from: US Kidney and Bladder (05.22.21 @ 17:34) >  Increased renal cortical echogenicity bilaterally which may be secondary to medical renal disease.  Multiple bilateral renal cysts, some of which are complex. In particular, a cyst in the left kidney demonstrates peripheral calcification and internal echogenic material of uncertain etiology.  Mild to moderate left hydronephrosis with echogenic material noted within the left renal collecting system. Nonobstructing right intrarenal calculi.  - avoid nephrotoxic agents  - Cr now slwoly improving .. no obvious etiology .. eosinophils positive  : will dw neph  - reviewed CT and lasix scan findings with urology Dr. Carrillo : planned OR on friday for cystoscopy/ureteroscopy with stenting ( ? possible B)   - cont current management     Problem/Plan - 2:  ·  Problem: Gastric cancer.  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     Problem/Plan - 3:  ·  Problem: HTN (hypertension).  Plan:increas norvasc  if still elevated      Problem/Plan - 4:  ·  Problem: back pain : sec to ruptured ? cyst   improved   no neuro deificits

## 2021-05-27 NOTE — PROGRESS NOTE ADULT - SUBJECTIVE AND OBJECTIVE BOX
Patient is a 75y old  Male who presents with a chief complaint of GIOVANNI (26 May 2021 22:41)    Patient seen this morning He is feeling much better.     MEDICATIONS  (STANDING):  amLODIPine   Tablet 5 milliGRAM(s) Oral daily  heparin   Injectable 5000 Unit(s) SubCutaneous every 8 hours  lactated ringers. 1000 milliLiter(s) (50 mL/Hr) IV Continuous <Continuous>  latanoprost 0.005% Ophthalmic Solution 1 Drop(s) Both EYES at bedtime  polyethylene glycol 3350 17 Gram(s) Oral daily  senna 2 Tablet(s) Oral at bedtime    MEDICATIONS  (PRN):  acetaminophen   Tablet .. 975 milliGRAM(s) Oral every 8 hours PRN Mild Pain (1 - 3), Moderate Pain (4 - 6)  metoclopramide Injectable 5 milliGRAM(s) IV Push every 8 hours PRN nausea/vomitting      Vital Signs Last 24 Hrs  T(C): 36.8 (27 May 2021 05:00), Max: 36.9 (26 May 2021 21:27)  T(F): 98.3 (27 May 2021 05:00), Max: 98.4 (26 May 2021 21:27)  HR: 72 (27 May 2021 05:00) (69 - 72)  BP: 173/84 (27 May 2021 05:00) (155/81 - 173/84)  BP(mean): --  RR: 17 (27 May 2021 05:00) (17 - 18)  SpO2: 98% (27 May 2021 05:00) (97% - 98%)    PE  NAD  Awake, alert  Anicteric  No rash grossly                            11.0   6.87  )-----------( 248      ( 27 May 2021 07:08 )             33.5       05-27    145  |  111<H>  |  31<H>  ----------------------------<  78  4.3   |  19<L>  |  3.90<H>    Ca    9.6      27 May 2021 07:08    TPro  5.2<L>  /  Alb  2.6<L>  /  TBili  x   /  DBili  x   /  AST  x   /  ALT  x   /  AlkPhos  x   05-26

## 2021-05-28 ENCOUNTER — RESULT REVIEW (OUTPATIENT)
Age: 76
End: 2021-05-28

## 2021-05-28 LAB
ANION GAP SERPL CALC-SCNC: 15 MMOL/L — SIGNIFICANT CHANGE UP (ref 5–17)
BUN SERPL-MCNC: 29 MG/DL — HIGH (ref 7–23)
CALCIUM SERPL-MCNC: 9.7 MG/DL — SIGNIFICANT CHANGE UP (ref 8.4–10.5)
CHLORIDE SERPL-SCNC: 112 MMOL/L — HIGH (ref 96–108)
CO2 SERPL-SCNC: 19 MMOL/L — LOW (ref 22–31)
CREAT SERPL-MCNC: 3.68 MG/DL — HIGH (ref 0.5–1.3)
GLUCOSE SERPL-MCNC: 80 MG/DL — SIGNIFICANT CHANGE UP (ref 70–99)
HCT VFR BLD CALC: 34.1 % — LOW (ref 39–50)
HGB BLD-MCNC: 11.3 G/DL — LOW (ref 13–17)
INR BLD: 1.04 RATIO — SIGNIFICANT CHANGE UP (ref 0.88–1.16)
MCHC RBC-ENTMCNC: 28.3 PG — SIGNIFICANT CHANGE UP (ref 27–34)
MCHC RBC-ENTMCNC: 33.1 GM/DL — SIGNIFICANT CHANGE UP (ref 32–36)
MCV RBC AUTO: 85.5 FL — SIGNIFICANT CHANGE UP (ref 80–100)
NRBC # BLD: 0 /100 WBCS — SIGNIFICANT CHANGE UP (ref 0–0)
PLATELET # BLD AUTO: 265 K/UL — SIGNIFICANT CHANGE UP (ref 150–400)
POTASSIUM SERPL-MCNC: 4.2 MMOL/L — SIGNIFICANT CHANGE UP (ref 3.5–5.3)
POTASSIUM SERPL-SCNC: 4.2 MMOL/L — SIGNIFICANT CHANGE UP (ref 3.5–5.3)
PROTHROM AB SERPL-ACNC: 12.5 SEC — SIGNIFICANT CHANGE UP (ref 10.6–13.6)
RBC # BLD: 3.99 M/UL — LOW (ref 4.2–5.8)
RBC # FLD: 15.2 % — HIGH (ref 10.3–14.5)
SARS-COV-2 RNA SPEC QL NAA+PROBE: SIGNIFICANT CHANGE UP
SODIUM SERPL-SCNC: 146 MMOL/L — HIGH (ref 135–145)
WBC # BLD: 5.24 K/UL — SIGNIFICANT CHANGE UP (ref 3.8–10.5)
WBC # FLD AUTO: 5.24 K/UL — SIGNIFICANT CHANGE UP (ref 3.8–10.5)

## 2021-05-28 PROCEDURE — 88305 TISSUE EXAM BY PATHOLOGIST: CPT | Mod: 26

## 2021-05-28 PROCEDURE — 99233 SBSQ HOSP IP/OBS HIGH 50: CPT | Mod: GC

## 2021-05-28 RX ORDER — ACETAMINOPHEN 500 MG
975 TABLET ORAL EVERY 8 HOURS
Refills: 0 | Status: DISCONTINUED | OUTPATIENT
Start: 2021-05-28 | End: 2021-06-05

## 2021-05-28 RX ORDER — SODIUM CHLORIDE 9 MG/ML
1000 INJECTION INTRAMUSCULAR; INTRAVENOUS; SUBCUTANEOUS
Refills: 0 | Status: DISCONTINUED | OUTPATIENT
Start: 2021-05-28 | End: 2021-05-28

## 2021-05-28 RX ORDER — CEFTRIAXONE 500 MG/1
1000 INJECTION, POWDER, FOR SOLUTION INTRAMUSCULAR; INTRAVENOUS ONCE
Refills: 0 | Status: COMPLETED | OUTPATIENT
Start: 2021-05-28 | End: 2021-05-28

## 2021-05-28 RX ORDER — ONDANSETRON 8 MG/1
4 TABLET, FILM COATED ORAL ONCE
Refills: 0 | Status: DISCONTINUED | OUTPATIENT
Start: 2021-05-28 | End: 2021-05-28

## 2021-05-28 RX ORDER — AMLODIPINE BESYLATE 2.5 MG/1
5 TABLET ORAL DAILY
Refills: 0 | Status: DISCONTINUED | OUTPATIENT
Start: 2021-05-28 | End: 2021-05-29

## 2021-05-28 RX ORDER — METOCLOPRAMIDE HCL 10 MG
5 TABLET ORAL THREE TIMES A DAY
Refills: 0 | Status: DISCONTINUED | OUTPATIENT
Start: 2021-05-28 | End: 2021-06-05

## 2021-05-28 RX ORDER — FENTANYL CITRATE 50 UG/ML
25 INJECTION INTRAVENOUS
Refills: 0 | Status: DISCONTINUED | OUTPATIENT
Start: 2021-05-28 | End: 2021-05-28

## 2021-05-28 RX ORDER — LATANOPROST 0.05 MG/ML
1 SOLUTION/ DROPS OPHTHALMIC; TOPICAL AT BEDTIME
Refills: 0 | Status: DISCONTINUED | OUTPATIENT
Start: 2021-05-28 | End: 2021-06-05

## 2021-05-28 RX ORDER — HEPARIN SODIUM 5000 [USP'U]/ML
5000 INJECTION INTRAVENOUS; SUBCUTANEOUS EVERY 8 HOURS
Refills: 0 | Status: DISCONTINUED | OUTPATIENT
Start: 2021-05-28 | End: 2021-06-05

## 2021-05-28 RX ORDER — SIMETHICONE 80 MG/1
80 TABLET, CHEWABLE ORAL EVERY 6 HOURS
Refills: 0 | Status: DISCONTINUED | OUTPATIENT
Start: 2021-05-28 | End: 2021-06-05

## 2021-05-28 RX ORDER — POLYETHYLENE GLYCOL 3350 17 G/17G
17 POWDER, FOR SOLUTION ORAL DAILY
Refills: 0 | Status: DISCONTINUED | OUTPATIENT
Start: 2021-05-28 | End: 2021-06-05

## 2021-05-28 RX ORDER — SODIUM CHLORIDE 9 MG/ML
1000 INJECTION, SOLUTION INTRAVENOUS
Refills: 0 | Status: DISCONTINUED | OUTPATIENT
Start: 2021-05-28 | End: 2021-05-29

## 2021-05-28 RX ORDER — HYDROMORPHONE HYDROCHLORIDE 2 MG/ML
0.5 INJECTION INTRAMUSCULAR; INTRAVENOUS; SUBCUTANEOUS
Refills: 0 | Status: DISCONTINUED | OUTPATIENT
Start: 2021-05-28 | End: 2021-05-28

## 2021-05-28 RX ORDER — SENNA PLUS 8.6 MG/1
2 TABLET ORAL AT BEDTIME
Refills: 0 | Status: DISCONTINUED | OUTPATIENT
Start: 2021-05-28 | End: 2021-06-05

## 2021-05-28 RX ADMIN — SODIUM CHLORIDE 50 MILLILITER(S): 9 INJECTION, SOLUTION INTRAVENOUS at 05:43

## 2021-05-28 RX ADMIN — HEPARIN SODIUM 5000 UNIT(S): 5000 INJECTION INTRAVENOUS; SUBCUTANEOUS at 22:53

## 2021-05-28 RX ADMIN — Medication 5 MILLIGRAM(S): at 05:43

## 2021-05-28 RX ADMIN — AMLODIPINE BESYLATE 5 MILLIGRAM(S): 2.5 TABLET ORAL at 05:40

## 2021-05-28 RX ADMIN — CEFTRIAXONE 100 MILLIGRAM(S): 500 INJECTION, POWDER, FOR SOLUTION INTRAMUSCULAR; INTRAVENOUS at 07:43

## 2021-05-28 RX ADMIN — SODIUM CHLORIDE 75 MILLILITER(S): 9 INJECTION INTRAMUSCULAR; INTRAVENOUS; SUBCUTANEOUS at 15:02

## 2021-05-28 RX ADMIN — LATANOPROST 1 DROP(S): 0.05 SOLUTION/ DROPS OPHTHALMIC; TOPICAL at 22:53

## 2021-05-28 RX ADMIN — Medication 5 MILLIGRAM(S): at 22:54

## 2021-05-28 NOTE — PROGRESS NOTE ADULT - SUBJECTIVE AND OBJECTIVE BOX
Date of service: 05-28-21 @ 13:14      Patient is a 75y old  Male who presents with a chief complaint of GIOVANNI (28 May 2021 12:12)                                                               INTERVAL HPI/OVERNIGHT EVENTS:    REVIEW OF SYSTEMS:     CONSTITUTIONAL: No weakness, fevers or chills  EYES/ENT: No visual changes , no ear ache   NECK: No pain or stiffness  RESPIRATORY: No cough, wheezing,  No shortness of breath  CARDIOVASCULAR: No chest pain or palpitations  GASTROINTESTINAL: No abdominal pain  . improved N   no vomitting   GENITOURINARY: No dysuria, frequency or hematuria  NEUROLOGICAL: No numbness or weakness  SKIN: No itching, burning, rashes, or lesions                                                                                                                                                                                                                                                                                 Medications:  MEDICATIONS  (STANDING):    MEDICATIONS  (PRN):       Allergies    aspirin (Rash)    Intolerances      Vital Signs Last 24 Hrs  T(C): 36.8 (28 May 2021 11:52), Max: 37 (27 May 2021 21:14)  T(F): 98.2 (28 May 2021 11:35), Max: 98.6 (27 May 2021 21:14)  HR: 76 (28 May 2021 11:52) (73 - 85)  BP: 177/76 (28 May 2021 11:52) (157/80 - 179/88)  BP(mean): --  RR: 16 (28 May 2021 11:52) (16 - 18)  SpO2: 98% (28 May 2021 11:52) (93% - 98%)  CAPILLARY BLOOD GLUCOSE          05-27 @ 07:01  -  05-28 @ 07:00  --------------------------------------------------------  IN: 720 mL / OUT: 600 mL / NET: 120 mL    05-28 @ 07:01  -  05-28 @ 13:14  --------------------------------------------------------  IN: 0 mL / OUT: 0 mL / NET: 0 mL      Physical Exam:    Daily Height in cm: 170.18 (28 May 2021 11:52)    Daily   General:  Well appearing, NAD, not cachetic  HEENT:  Nonicteric, PERRLA  CV:  RRR, S1S2   Lungs:  CTA B/L, no wheezes, rales, rhonchi  Abdomen:  Soft, non-tender, no distended, positive BS  Extremities:  2+ pulses, no c/c, no edema  Skin:  Warm and dry, no rashes  :  No thibodeaux  Neuro:  AAOx3, non-focal, grossly intact                                                                                                                                                                                                                                                                                                LABS:                               11.3   5.24  )-----------( 265      ( 28 May 2021 07:03 )             34.1                      05-28    146<H>  |  112<H>  |  29<H>  ----------------------------<  80  4.2   |  19<L>  |  3.68<H>    Ca    9.7      28 May 2021 07:00                         RADIOLOGY & ADDITIONAL TESTS         I personally reviewed: [  ]EKG   [  ]CXR    [  ] CT      A/P:         Discussed with :     [place] consultants' Notes   Time spent :

## 2021-05-28 NOTE — PROGRESS NOTE ADULT - ASSESSMENT
75y M with gastric cancer presenting with acute renal failure    #GIOVANNI  - Pt reportedly had normal renal fxn several weeks ago. Now presenting with Cr of 4mg/dl in setting of poor Po intake and ? ARB use. Possibly now with ATN vs. obstructive disease  - UA relatively bland, no significant proteinuria  - Cr stable at 3.6-3.9mg/dl  - Renal US findings noted, patient with multiple renal cysts b/l and likely underlying cystic disease but he does not have PCKD as his renal failure is acute, his Cr was .66mg/dl back on 5/14 and acutely eliot to 3.97mg/dl on 5/17 which would not be consistent with a hx of PCKD.  - Left renal collecting system with moderate hydronephrosis with echogenic material despite thibodeaux, appreciate urology recommendations, plan for retrograde pyelogram  - Agree with IVF, reduced to 50cc/hr given the bloating  - Discussed with the daughter/wife the possibility of a kidney biopsy for prognostication and diagnosis if no improvement noted. Thus far serological w/u unrevealing and no proteinuria noted    - Dose medications to eGFR<15 for now    #Hypernatremia  - Na 146, due to NPO. Encourage free water intake post procedure       #Metabolic acidosis  - NAGMA, 2/2 GIOVANNI  - Monitor bicarb for now, no indication for bicarb therapy at this time

## 2021-05-28 NOTE — PROGRESS NOTE ADULT - ASSESSMENT
75 yr old male w/ recent diagnosis of stage IV gastric carcinoma sent from [facility] with noted outpt abnormal labs concerning for new onset acute renal failure.     Problem/Plan - 1:  ·  Problem: Acute kidney failure.  Plan: may have component of dehydration/pre renal but unclear cause of rapid decline; pt denies any new medications  - Renal US:< from: US Kidney and Bladder (05.22.21 @ 17:34) >  Increased renal cortical echogenicity bilaterally which may be secondary to medical renal disease.  Multiple bilateral renal cysts, some of which are complex. In particular, a cyst in the left kidney demonstrates peripheral calcification and internal echogenic material of uncertain etiology.  Mild to moderate left hydronephrosis with echogenic material noted within the left renal collecting system. Nonobstructing right intrarenal calculi.  - avoid nephrotoxic agents  - Cr now slwoly improving .. no obvious etiology .. eosinophils positive  : will dw neph  - reviewed CT and lasix scan findings with urology Dr. Carrillo : planned OR today for cystoscopy/ureteroscopy with stenting ( ? possible B)   - cont current management     Problem/Plan - 2:  ·  Problem: Gastric cancer.  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     Problem/Plan - 3:  ·  Problem: HTN (hypertension).  Plan:increas norvasc  if still elevated      Problem/Plan - 4:  ·  Problem: back pain : sec to ruptured ? cyst   improved   no neuro deificits

## 2021-05-28 NOTE — PROGRESS NOTE ADULT - SUBJECTIVE AND OBJECTIVE BOX
[facility] DIVISION OF KIDNEY DISEASES AND HYPERTENSION -- FOLLOW UP NOTE  --------------------------------------------------------------------------------  Trent Rosales   Nephrology Fellow  Pager NS: [phone]/ [facility]: [phone]  (After 5 pm or on weekends please page the on-call fellow, can view the schedule on Pricing Assistant. Login is kita lr, schedule under [facility] medicine, psych, derm.      Patient is a 75y old  Male who presents with a chief complaint of GIOVANNI (28 May 2021 13:14)      24 hour events/subjective: Patient seen and examined at the bedside. Vital signs, labs, medications reviewed. Remains nauseous, bloated. Cr stable.        PAST HISTORY  --------------------------------------------------------------------------------  No significant changes to PMH, PSH, FHx, SHx, unless otherwise noted    ALLERGIES & MEDICATIONS  --------------------------------------------------------------------------------  Allergies    aspirin (Rash)    Intolerances      Standing Inpatient Medications    PRN Inpatient Medications      REVIEW OF SYSTEMS  --------------------------------------------------------------------------------  Gen: No fevers/chills  Skin: No rashes  Head/Eyes/Ears: Normal hearing, no difficulty seeing  Respiratory: No dyspnea, cough  CV: No chest pain  GI: No abdominal pain, diarrhea  : No dysuria, hematuria  MSK: No  edema  Heme: No easy bruising or bleeding  Psych: No significant depression    >>> <<<    VITALS/PHYSICAL EXAM  --------------------------------------------------------------------------------  T(C): 36.8 (05-28-21 @ 11:52), Max: 37 (05-27-21 @ 21:14)  HR: 76 (05-28-21 @ 11:52) (73 - 85)  BP: 177/76 (05-28-21 @ 11:52) (157/80 - 179/88)  RR: 16 (05-28-21 @ 11:52) (16 - 18)  SpO2: 98% (05-28-21 @ 11:52) (93% - 98%)  Wt(kg): --  Height (cm): 170.2 (05-28-21 @ 11:52)  Weight (kg): 69.9 (05-28-21 @ 11:52)  BMI (kg/m2): 24.1 (05-28-21 @ 11:52)  BSA (m2): 1.81 (05-28-21 @ 11:52)      05-27-21 @ 07:01  -  05-28-21 @ 07:00  --------------------------------------------------------  IN: 720 mL / OUT: 600 mL / NET: 120 mL    05-28-21 @ 07:01  -  05-28-21 @ 13:18  --------------------------------------------------------  IN: 0 mL / OUT: 0 mL / NET: 0 mL      Physical Exam:    	Gen: NAD  	HEENT: Anicteric  	Pulm: CTA B/L  	CV: S1S2  	Abd: Soft, +BS   	MSK: No LE edema B/L  	Neuro: Awake  	Skin: Warm and dry  	Vascular access:      LABS/STUDIES  --------------------------------------------------------------------------------              11.3   5.24  >-----------<  265      [05-28-21 @ 07:03]              34.1     146  |  112  |  29  ----------------------------<  80      [05-28-21 @ 07:00]  4.2   |  19  |  3.68        Ca     9.7     [05-28-21 @ 07:00]      PT/INR: PT 12.5 , INR 1.04       [05-28-21 @ 07:07]      Creatinine Trend:  SCr 3.68 [05-28 @ 07:00]  SCr 3.90 [05-27 @ 07:08]  SCr 3.75 [05-26 @ 10:48]  SCr 3.71 [05-25 @ 06:47]  SCr 3.65 [05-24 @ 07:14]    Urinalysis - [05-25-21 @ 07:49]      Color Light Yellow / Appearance Clear / SG 1.014 / pH 6.0      Gluc Negative / Ketone Small  / Bili Negative / Urobili Negative       Blood Moderate / Protein Trace / Leuk Est Large / Nitrite Negative      RBC 11 / WBC 7 / Hyaline 0 / Gran  / Sq Epi  / Non Sq Epi 0 / Bacteria Negative    Urine Creatinine 107      [05-25-21 @ 07:50]  Urine Protein 18      [05-25-21 @ 07:50]  Urine Sodium 96      [05-25-21 @ 07:50]  Urine Potassium 23      [05-25-21 @ 07:50]  Urine Chloride 76      [05-25-21 @ 07:50]      HBsAg Nonreact      [05-25-21 @ 15:14]  HCV 0.84, Grayzone      [05-25-21 @ 15:14]    dsDNA <12      [05-25-21 @ 15:16]  C3 Complement 113      [05-25-21 @ 15:35]  C4 Complement 36      [05-25-21 @ 15:35]  MPO-ANCA <5.0, interpretation: Negative      [05-26-21 @ 08:40]  PR3-ANCA 5.1, interpretation: Negative      [05-26-21 @ 08:40]  anti-GBM 3      [05-25-21 @ 17:51]  Free Light Chains: kappa 4.90, lambda 2.72, ratio = 1.80      [05-26 @ 08:40]  Immunofixation Serum:   No Monoclonal Band Identified    Reference Range: None Detected      [05-26-21 @ 08:40]  SPEP Interpretation: Normal Electrophoresis Pattern      [05-26-21 @ 08:40]

## 2021-05-28 NOTE — PROGRESS NOTE ADULT - ASSESSMENT
75 yr old male w/ recent diagnosis of stage IV gastric carcinoma sent from [facility] with abnormal labs, notably elevated sCr. Pt notes normal renal function about two weeks ago. Notes he was also recently in ED earlier this week where he was really dehdrated and received some IVF (?500 cc NS) and then sent home. Notes he had decreased urine output that day but has otherwise not noted a decrease in urine output at home. No blood in urine. Denies new medications including NSAIDS or antibiotics. Has had reduced oral intake in the last 3 weeks associated with lack of appetite. Currently w/ mild nausea and discomfort.  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 Plan to start chemo.    (20 May 2021 21:37)    Hematology/Oncology called to see patient who was seen in consultation by Dr. Kendall Arellano and [facility] on 5/20/2021 for stage IV gastric cancer. History provided by Dr. Arellano as follows:  75-year-old male with HTN was experiencing abdominal pain, fatigue and weight loss of 10 pounds, with symptoms worsening over 6 week period  Patient saw PMD and was referred to gastroenterology 4/15/2021 CT A/P w/o con outside report – mesenteric, retroperitoneal, pelvic adenopathy concerning for malignancy, innumerable hyper and hypodense cysts throughout kidneys demonstrating peripheral calcifications, compatible with polycystic kidney disease. Multiple lytic and sclerotic lesions throughout sacrum and to lesser extent pelvis concerning for metastasis.  Prostatomegaly. Hypoattenuating lesions in liver may represent cysts, however metastases cannot be entirely excluded  5/5/2021 PET/CT [facility] outside report - linear segment of hypermetabolism associated with lesser curvature of mid stomach measures 4 x 1cm, SUV 6.0; numerous hypo and  hyperdense cysts throughout bilateral kidneys, multiple hypodense lesions in liver consistent with hepatic cysts; FDG avid posterior mediastinal lymph node mass adjacent to mid esophagus, SUV 5.0, 2.1 x 1.9cm. Numerous FDG avid lymph nodes throughout abdomen and pelvis for example right external iliac node, SUV 5.1, 1.7 x 1.4cm. Inactive left thyroid nodule, lack of uptake favors adenoma. No aggressive osseous lesions seen.  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 Avelina Chaudhry of [facility] who informed patient of stage IV gastric cancer diagnosis, palliative intent of treatment and recommended DOF + Herceptin (docetaxel 70mg/m2 d1, oxaliplatin 130mg/m2, IVCI fluorouracil 750mg/m2 d1-5, trastuzumab 8mg/kg loading dose then 6 mg/kg maintenance every 3 weeks as part of 21 day schedule)  As of day of appointment, patient had not signed release of information to request pathology and imaging review at [facility], [facility] review not yet available  Patient reports he feels well, is urinating less. Patient notes that he had eGFR >100 last week but within a few days had dropped significantly with no cause known.    Stage IV Gastric Cancer  --Seen by Dr. Kendall Arellano at [facility]  --Treatment per Dr. Arellano after optimization of renal function and discharge  --S/P echocardiogram per Dr. Arellano's request prior to chemo - normal LVEF    Acute Kidney Injury  --Nephrology Consult appreciated; recommended IVF and USG to assess for hydronephrosis   --Cr more or less stable; 3.90  --ATN vs. ARB induced vs. hydronephrosis ;  noted US to have increased renal cortical echogenicity bilaterally which may be secondary to medical renal disease; mild to moderate left hydronephrosis with echogenic material noted within the left renal collecting system.   --S/P "Lasix" Scan to assess renal function - consistent with obstructive process  --Urine Eosinophils positive  --CT shows mild/moderate left hydronephrosis  --Appreciate  evaluation  --Pending stent placement or Nephrostomy tube 5/28/2021    We will be happy to answer any onc questions on behalf of [facility] and will be in touch with them.       Josr Nunes PA-C  Hematology/Oncology  [facility]   [phone] (cell)  [phone] (office)  [phone] (alt office)  Evenings and weekends please call MD on call or office

## 2021-05-28 NOTE — PROGRESS NOTE ADULT - SUBJECTIVE AND OBJECTIVE BOX
Patient is a 75y old  Male who presents with a chief complaint of GIOVANNI (27 May 2021 14:00)    Patient seen this morning. Feeling well. Was waiting for stent placement today.    MEDICATIONS  (STANDING):  amLODIPine   Tablet 5 milliGRAM(s) Oral daily  heparin   Injectable 5000 Unit(s) SubCutaneous every 8 hours  lactated ringers. 1000 milliLiter(s) (50 mL/Hr) IV Continuous <Continuous>  latanoprost 0.005% Ophthalmic Solution 1 Drop(s) Both EYES at bedtime  metoclopramide Injectable 5 milliGRAM(s) IV Push three times a day  polyethylene glycol 3350 17 Gram(s) Oral daily  senna 2 Tablet(s) Oral at bedtime    MEDICATIONS  (PRN):  acetaminophen   Tablet .. 975 milliGRAM(s) Oral every 8 hours PRN Mild Pain (1 - 3), Moderate Pain (4 - 6)  simethicone 80 milliGRAM(s) Chew every 6 hours PRN Gas        Vital Signs Last 24 Hrs  T(C): 36.8 (28 May 2021 11:35), Max: 37 (27 May 2021 21:14)  T(F): 98.2 (28 May 2021 11:35), Max: 98.6 (27 May 2021 21:14)  HR: 76 (28 May 2021 11:35) (73 - 85)  BP: 177/76 (28 May 2021 11:35) (157/80 - 179/88)  BP(mean): --  RR: 16 (28 May 2021 11:35) (16 - 18)  SpO2: 98% (28 May 2021 11:35) (93% - 98%)    PE  NAD  Awake, alert  Anicteric  No rash grossly                            11.3   5.24  )-----------( 265      ( 28 May 2021 07:03 )             34.1       05-28    146<H>  |  112<H>  |  29<H>  ----------------------------<  80  4.2   |  19<L>  |  3.68<H>    Ca    9.7      28 May 2021 07:00

## 2021-05-28 NOTE — PRE-ANESTHESIA EVALUATION ADULT - NSPREOPDXFT_GEN_ALL_CORE
Cystoscopy, left ureteroscopy, , possible biopy, right retrograde pyelogram, possible bilaterlal stent placement

## 2021-05-28 NOTE — PROGRESS NOTE ADULT - SUBJECTIVE AND OBJECTIVE BOX
CT with possible left distal intraluminal ureteral obstruction. Renal scan with left obstruction, equivocal right obstruction.    OR today for left ureteroscopy, poss biopsy and stent, right retrograde pyelogram and possible stent insertion.    Frantz Holbrook MD  [facility]  [address], [address], [place] [address]  Office: [phone] Fax: [phone]

## 2021-05-28 NOTE — BRIEF OPERATIVE NOTE - NSICDXBRIEFPROCEDURE_GEN_ALL_CORE_FT
PROCEDURES:  Cystoureteroscopy, with bilateral retrograde pyelogram and ureteral stent insertion 28-May-2021 13:24:59  Sánchez Salguero  Cystourethroscopy with ureteral biopsy 28-May-2021 13:25:16  Sánchez Salguero

## 2021-05-29 LAB
ANION GAP SERPL CALC-SCNC: 13 MMOL/L — SIGNIFICANT CHANGE UP (ref 5–17)
BUN SERPL-MCNC: 25 MG/DL — HIGH (ref 7–23)
CALCIUM SERPL-MCNC: 9.6 MG/DL — SIGNIFICANT CHANGE UP (ref 8.4–10.5)
CHLORIDE SERPL-SCNC: 115 MMOL/L — HIGH (ref 96–108)
CO2 SERPL-SCNC: 21 MMOL/L — LOW (ref 22–31)
CREAT SERPL-MCNC: 3.27 MG/DL — HIGH (ref 0.5–1.3)
GLUCOSE SERPL-MCNC: 84 MG/DL — SIGNIFICANT CHANGE UP (ref 70–99)
POTASSIUM SERPL-MCNC: 4.3 MMOL/L — SIGNIFICANT CHANGE UP (ref 3.5–5.3)
POTASSIUM SERPL-SCNC: 4.3 MMOL/L — SIGNIFICANT CHANGE UP (ref 3.5–5.3)
SODIUM SERPL-SCNC: 149 MMOL/L — HIGH (ref 135–145)

## 2021-05-29 PROCEDURE — 99232 SBSQ HOSP IP/OBS MODERATE 35: CPT | Mod: GC

## 2021-05-29 RX ORDER — ONDANSETRON 8 MG/1
4 TABLET, FILM COATED ORAL ONCE
Refills: 0 | Status: COMPLETED | OUTPATIENT
Start: 2021-05-29 | End: 2021-05-29

## 2021-05-29 RX ORDER — AMLODIPINE BESYLATE 2.5 MG/1
10 TABLET ORAL DAILY
Refills: 0 | Status: DISCONTINUED | OUTPATIENT
Start: 2021-05-29 | End: 2021-05-29

## 2021-05-29 RX ORDER — SODIUM CHLORIDE 9 MG/ML
1000 INJECTION, SOLUTION INTRAVENOUS
Refills: 0 | Status: COMPLETED | OUTPATIENT
Start: 2021-05-29 | End: 2021-05-30

## 2021-05-29 RX ORDER — AMLODIPINE BESYLATE 2.5 MG/1
5 TABLET ORAL ONCE
Refills: 0 | Status: COMPLETED | OUTPATIENT
Start: 2021-05-29 | End: 2021-05-29

## 2021-05-29 RX ORDER — HYDRALAZINE HCL 50 MG
10 TABLET ORAL THREE TIMES A DAY
Refills: 0 | Status: DISCONTINUED | OUTPATIENT
Start: 2021-05-29 | End: 2021-06-05

## 2021-05-29 RX ORDER — AMLODIPINE BESYLATE 2.5 MG/1
10 TABLET ORAL DAILY
Refills: 0 | Status: DISCONTINUED | OUTPATIENT
Start: 2021-05-30 | End: 2021-06-05

## 2021-05-29 RX ADMIN — LATANOPROST 1 DROP(S): 0.05 SOLUTION/ DROPS OPHTHALMIC; TOPICAL at 22:02

## 2021-05-29 RX ADMIN — AMLODIPINE BESYLATE 5 MILLIGRAM(S): 2.5 TABLET ORAL at 05:41

## 2021-05-29 RX ADMIN — Medication 5 MILLIGRAM(S): at 15:10

## 2021-05-29 RX ADMIN — SODIUM CHLORIDE 75 MILLILITER(S): 9 INJECTION, SOLUTION INTRAVENOUS at 15:10

## 2021-05-29 RX ADMIN — POLYETHYLENE GLYCOL 3350 17 GRAM(S): 17 POWDER, FOR SOLUTION ORAL at 12:19

## 2021-05-29 RX ADMIN — HEPARIN SODIUM 5000 UNIT(S): 5000 INJECTION INTRAVENOUS; SUBCUTANEOUS at 22:02

## 2021-05-29 RX ADMIN — HEPARIN SODIUM 5000 UNIT(S): 5000 INJECTION INTRAVENOUS; SUBCUTANEOUS at 05:40

## 2021-05-29 RX ADMIN — AMLODIPINE BESYLATE 5 MILLIGRAM(S): 2.5 TABLET ORAL at 17:42

## 2021-05-29 RX ADMIN — Medication 10 MILLIGRAM(S): at 22:02

## 2021-05-29 RX ADMIN — SODIUM CHLORIDE 75 MILLILITER(S): 9 INJECTION, SOLUTION INTRAVENOUS at 16:55

## 2021-05-29 RX ADMIN — HEPARIN SODIUM 5000 UNIT(S): 5000 INJECTION INTRAVENOUS; SUBCUTANEOUS at 15:10

## 2021-05-29 RX ADMIN — Medication 5 MILLIGRAM(S): at 05:41

## 2021-05-29 RX ADMIN — ONDANSETRON 4 MILLIGRAM(S): 8 TABLET, FILM COATED ORAL at 04:11

## 2021-05-29 RX ADMIN — SODIUM CHLORIDE 75 MILLILITER(S): 9 INJECTION, SOLUTION INTRAVENOUS at 01:36

## 2021-05-29 NOTE — PROGRESS NOTE ADULT - SUBJECTIVE AND OBJECTIVE BOX
Urology Progress Note    Overnight Events:  No acute urologic events overnight. No issues after surgery. Feels well and without pain    Review of Systems:   Constitutional: No weight loss, no weakness  CV: No chest pain, no chest pressure  Pulm: No shortness of breath, cough, or sputum    Physical Exam:  Vital signs  T(C): 36.9 (05-29-21 @ 04:55), Max: 36.9 (05-29-21 @ 04:55)  HR: 68 (05-29-21 @ 04:55)  BP: 169/73 (05-29-21 @ 04:55)  SpO2: 97% (05-29-21 @ 04:55)  Wt(kg): --    Gen: No acute distress. Normal mood  Abd: soft, non-tender, non-distended  : Non-palpable bladder, thibodeaux with clear urine    Labs:      05-28 @ 07:03    WBC 5.24  / Hct 34.1  / SCr --       05-28 @ 07:00    WBC --    / Hct --    / SCr 3.68     05-28    146<H>  |  112<H>  |  29<H>  ----------------------------<  80  4.2   |  19<L>  |  3.68<H>    Ca    9.7      28 May 2021 07:00      PT/INR - ( 28 May 2021 07:07 )   PT: 12.5 sec;   INR: 1.04 ratio

## 2021-05-29 NOTE — PROGRESS NOTE ADULT - SUBJECTIVE AND OBJECTIVE BOX
[facility] DIVISION OF KIDNEY DISEASES AND HYPERTENSION -- FOLLOW UP NOTE  --------------------------------------------------------------------------------  Trent Rosales   Nephrology Fellow  Pager NS: [phone]/ [facility]: [phone]  (After 5 pm or on weekends please page the on-call fellow, can view the schedule on [employer]. Login is kita lr, schedule under [facility] medicine, psych, derm.      Patient is a 75y old  Male who presents with a chief complaint of GIOVANNI (29 May 2021 07:52)      24 hour events/subjective: Patient seen and examined at the bedside. Vital signs, labs, medications reviewed. Patient is s/p B/L retrograde pyelogram and left ureteral stent insertion. Still has some nausea, bloating. Improving. Non-oliguric after stent        PAST HISTORY  --------------------------------------------------------------------------------  No significant changes to PMH, PSH, FHx, SHx, unless otherwise noted    ALLERGIES & MEDICATIONS  --------------------------------------------------------------------------------  Allergies    aspirin (Rash)    Intolerances      Standing Inpatient Medications  amLODIPine   Tablet 5 milliGRAM(s) Oral daily  heparin   Injectable 5000 Unit(s) SubCutaneous every 8 hours  lactated ringers. 1000 milliLiter(s) IV Continuous <Continuous>  latanoprost 0.005% Ophthalmic Solution 1 Drop(s) Both EYES at bedtime  metoclopramide Injectable 5 milliGRAM(s) IV Push three times a day  polyethylene glycol 3350 17 Gram(s) Oral daily  senna 2 Tablet(s) Oral at bedtime    PRN Inpatient Medications  acetaminophen   Tablet .. 975 milliGRAM(s) Oral every 8 hours PRN  simethicone 80 milliGRAM(s) Chew every 6 hours PRN      REVIEW OF SYSTEMS  --------------------------------------------------------------------------------  Gen: No fevers/chills  Skin: No rashes  Head/Eyes/Ears: Normal hearing, no difficulty seeing  Respiratory: No dyspnea, cough  CV: No chest pain  GI: No abdominal pain, diarrhea  : No dysuria, hematuria  MSK: No  edema  Heme: No easy bruising or bleeding  Psych: No significant depression    >>> <<<    VITALS/PHYSICAL EXAM  --------------------------------------------------------------------------------  T(C): 36.9 (05-29-21 @ 04:55), Max: 36.9 (05-29-21 @ 04:55)  HR: 68 (05-29-21 @ 04:55) (65 - 83)  BP: 169/73 (05-29-21 @ 04:55) (159/80 - 186/91)  RR: 16 (05-29-21 @ 04:55) (14 - 17)  SpO2: 97% (05-29-21 @ 04:55) (96% - 100%)  Wt(kg): --  Height (cm): 170.2 (05-28-21 @ 11:52)  Weight (kg): 69.9 (05-28-21 @ 11:52)  BMI (kg/m2): 24.1 (05-28-21 @ 11:52)  BSA (m2): 1.81 (05-28-21 @ 11:52)      05-28-21 @ 07:01  -  05-29-21 @ 07:00  --------------------------------------------------------  IN: 1150 mL / OUT: 2725 mL / NET: -1575 mL      Physical Exam:    	Gen: NAD  	HEENT: Anicteric  	Pulm: CTA B/L  	CV: S1S2  	Abd: Soft, +BS   	MSK: No LE edema B/L  	Neuro: Awake  	Skin: Warm and dry  	Vascular access:      LABS/STUDIES  --------------------------------------------------------------------------------              11.3   5.24  >-----------<  265      [05-28-21 @ 07:03]              34.1     146  |  112  |  29  ----------------------------<  80      [05-28-21 @ 07:00]  4.2   |  19  |  3.68        Ca     9.7     [05-28-21 @ 07:00]      PT/INR: PT 12.5 , INR 1.04       [05-28-21 @ 07:07]      Creatinine Trend:  SCr 3.68 [05-28 @ 07:00]  SCr 3.90 [05-27 @ 07:08]  SCr 3.75 [05-26 @ 10:48]  SCr 3.71 [05-25 @ 06:47]  SCr 3.65 [05-24 @ 07:14]    Urinalysis - [05-25-21 @ 07:49]      Color Light Yellow / Appearance Clear / SG 1.014 / pH 6.0      Gluc Negative / Ketone Small  / Bili Negative / Urobili Negative       Blood Moderate / Protein Trace / Leuk Est Large / Nitrite Negative      RBC 11 / WBC 7 / Hyaline 0 / Gran  / Sq Epi  / Non Sq Epi 0 / Bacteria Negative    Urine Creatinine 107      [05-25-21 @ 07:50]  Urine Protein 18      [05-25-21 @ 07:50]  Urine Sodium 96      [05-25-21 @ 07:50]  Urine Potassium 23      [05-25-21 @ 07:50]  Urine Chloride 76      [05-25-21 @ 07:50]      HBsAg Nonreact      [05-25-21 @ 15:14]  HCV 0.84, Grayzone      [05-25-21 @ 15:14]    dsDNA <12      [05-25-21 @ 15:16]  C3 Complement 113      [05-25-21 @ 15:35]  C4 Complement 36      [05-25-21 @ 15:35]  MPO-ANCA <5.0, interpretation: Negative      [05-26-21 @ 08:40]  PR3-ANCA 5.1, interpretation: Negative      [05-26-21 @ 08:40]  anti-GBM 3      [05-25-21 @ 17:51]  Free Light Chains: kappa 4.90, lambda 2.72, ratio = 1.80      [05-26 @ 08:40]  Immunofixation Serum:   No Monoclonal Band Identified    Reference Range: None Detected      [05-26-21 @ 08:40]  SPEP Interpretation: Normal Electrophoresis Pattern      [05-26-21 @ 08:40]

## 2021-05-29 NOTE — PROGRESS NOTE ADULT - ASSESSMENT
75 yr old male w/ recent diagnosis of stage IV gastric carcinoma sent from [facility] with noted outpt abnormal labs concerning for new onset acute renal failure.     Problem/Plan - 1:  ·  Problem: Acute kidney failure.  Plan: may have component of dehydration/pre renal but unclear cause of rapid decline; pt denies any new medications  - Renal US:< from: US Kidney and Bladder (05.22.21 @ 17:34) >  Increased renal cortical echogenicity bilaterally which may be secondary to medical renal disease.  Multiple bilateral renal cysts, some of which are complex. In particular, a cyst in the left kidney demonstrates peripheral calcification and internal echogenic material of uncertain etiology.  Mild to moderate left hydronephrosis with echogenic material noted within the left renal collecting system. Nonobstructing right intrarenal calculi.  - avoid nephrotoxic agents  - Cr now slwoly improving .. no obvious etiology .. eosinophils positive  : will dw neph  - reviewed CT and lasix scan findings with urology Dr. Carrillo : pt s/p OR 5/28/21 for cystoscopy/ureteroscopy with stenting   - cont current management, Cr slowly improving     Problem/Plan - 2:  ·  Problem: Gastric cancer.  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     Problem/Plan - 3:  ·  Problem: HTN (hypertension).  Plan:   BP uncontrolled.   increase Norvasc to 10 mg   add Hydralazine 10 mg TID  (home ARB on hold due to Cr)     Problem/Plan - 4:  ·  Problem: back pain : sec to ruptured ? cyst   improved   no neuro deificits  75 yr old male w/ recent diagnosis of stage IV gastric carcinoma sent from [facility] with noted outpt abnormal labs concerning for new onset acute renal failure.     Problem/Plan - 1:  ·  Problem: Acute kidney failure.  Plan: may have component of dehydration/pre renal but unclear cause of rapid decline; pt denies any new medications  - Renal US:< from: US Kidney and Bladder (05.22.21 @ 17:34) >  Increased renal cortical echogenicity bilaterally which may be secondary to medical renal disease.  Multiple bilateral renal cysts, some of which are complex. In particular, a cyst in the left kidney demonstrates peripheral calcification and internal echogenic material of uncertain etiology.  Mild to moderate left hydronephrosis with echogenic material noted within the left renal collecting system. Nonobstructing right intrarenal calculi.  - avoid nephrotoxic agents  - Cr now slwoly improving .. no obvious etiology .. eosinophils positive  : will dw neph  - reviewed CT and lasix scan findings with urology Dr. Carrillo : pt s/p OR 5/28/21 for cystoscopy/ureteroscopy with stenting   - cont current management, Cr slowly improving   - Hypernatremia - encourage oral in take. c/w LR IVF    Problem/Plan - 2:  ·  Problem: Gastric cancer.  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     Problem/Plan - 3:  ·  Problem: HTN (hypertension).  Plan:   BP uncontrolled.   increase Norvasc to 10 mg   add Hydralazine 10 mg TID  (home ARB on hold due to Cr)     Problem/Plan - 4:  ·  Problem: back pain : sec to ruptured ? cyst   improved   no neuro deificits

## 2021-05-29 NOTE — PROGRESS NOTE ADULT - ASSESSMENT
75 yr old male w/ recent diagnosis of stage IV gastric carcinoma sent from [facility] with abnormal labs, notably elevated sCr.     Stage IV Gastric Cancer  --Seen by Dr. Kendall Arellano at [facility]  --Treatment per Dr. Arellano after optimization of renal function and discharge  --S/P echocardiogram per Dr. Arellano's request prior to chemo - normal LVEF    Acute Kidney Injury -- noted to have mild to mod L hydronephrosis with echogenic material in the L renal collecting system  --Nephrology Consult appreciated  --Cr improving s/p ureteral stent and bx  --f/u     D/w pt and family, will follow, [phone]

## 2021-05-29 NOTE — PROGRESS NOTE ADULT - SUBJECTIVE AND OBJECTIVE BOX
SUBJECTIVE / OVERNIGHT EVENTS:  --- Coverage for Dr. Mullins ---   No overnight events.  Pt feels well.  Denied cp, sob, n/v/d.  no abdominal pain. No HA/dizziness.   s/p ureteral stent  pain controlled.  thibodeaux in place       --------------------------------------------------------------------------------------------  LABS:                        11.3   5.24  )-----------( 265      ( 28 May 2021 07:03 )             34.1     05-29    149<H>  |  115<H>  |  25<H>  ----------------------------<  84  4.3   |  21<L>  |  3.27<H>    Ca    9.6      29 May 2021 12:21      PT/INR - ( 28 May 2021 07:07 )   PT: 12.5 sec;   INR: 1.04 ratio           CAPILLARY BLOOD GLUCOSE                RADIOLOGY & ADDITIONAL TESTS:    Imaging Personally Reviewed:  [x] YES  [ ] NO    Consultant(s) Notes Reviewed:  [x] YES  [ ] NO    MEDICATIONS  (STANDING):  amLODIPine   Tablet 5 milliGRAM(s) Oral daily  heparin   Injectable 5000 Unit(s) SubCutaneous every 8 hours  lactated ringers. 1000 milliLiter(s) (75 mL/Hr) IV Continuous <Continuous>  latanoprost 0.005% Ophthalmic Solution 1 Drop(s) Both EYES at bedtime  metoclopramide Injectable 5 milliGRAM(s) IV Push three times a day  polyethylene glycol 3350 17 Gram(s) Oral daily  senna 2 Tablet(s) Oral at bedtime    MEDICATIONS  (PRN):  acetaminophen   Tablet .. 975 milliGRAM(s) Oral every 8 hours PRN Mild Pain (1 - 3), Moderate Pain (4 - 6)  simethicone 80 milliGRAM(s) Chew every 6 hours PRN Gas      Care Discussed with Consultants/Other Providers [x] YES  [ ] NO    Vital Signs Last 24 Hrs  T(C): 36.8 (29 May 2021 12:12), Max: 36.9 (29 May 2021 04:55)  T(F): 98.2 (29 May 2021 12:12), Max: 98.4 (29 May 2021 04:55)  HR: 74 (29 May 2021 12:12) (68 - 83)  BP: 176/82 (29 May 2021 12:12) (159/80 - 176/89)  BP(mean): 125 (28 May 2021 15:00) (122 - 125)  RR: 17 (29 May 2021 12:12) (14 - 17)  SpO2: 99% (29 May 2021 12:12) (96% - 99%)  I&O's Summary    28 May 2021 07:01  -  29 May 2021 07:00  --------------------------------------------------------  IN: 1150 mL / OUT: 2725 mL / NET: -1575 mL      PHYSICAL EXAM:  GENERAL: NAD, well-developed, comfortable  HEAD:  Atraumatic, Normocephalic  EYES: EOMI, PERRLA, conjunctiva and sclera clear  NECK: Supple, No JVD  CHEST/LUNG: Clear to auscultation bilaterally; No wheeze  HEART: Regular rate and rhythm; No murmurs, rubs, or gallops  ABDOMEN: Soft, Nontender, Nondistended; Bowel sounds present  : Thibodeaux in place, aaron color urine, neg CVAT   Neuro: AAOx3, no focal weakness, 5/5 b/l extremity strength  EXTREMITIES:  2+ Peripheral Pulses, No clubbing, cyanosis, or edema  SKIN: No rashes or lesions

## 2021-05-29 NOTE — PROGRESS NOTE ADULT - ASSESSMENT
75y M with gastric cancer presenting with acute renal failure    #GIOVANNI  - Pt reportedly had normal renal fxn several weeks ago. Now presenting with Cr of 4mg/dl in setting of poor Po intake and ? ARB use. Possibly now with ATN vs. obstructive disease  - UA relatively bland, no significant proteinuria  - Cr stable at 3.6-3.9mg/dl  - Renal US findings noted, patient with multiple renal cysts b/l and likely underlying cystic disease but he does not have PCKD as his renal failure is acute, his Cr was .66mg/dl back on 5/14 and acutely eliot to 3.97mg/dl on 5/17 which would not be consistent with a hx of PCKD.  - Left renal collecting system with moderate hydronephrosis with echogenic material despite thibodeaux, appreciate urology recs, he is s/p L ureteral stent and biopsy. Now with improving UO. Awaiting AM labs  - Agree with IVF, c/w LR 50cc/hr  - Discussed with the daughter/wife the possibility of a kidney biopsy for prognostication and diagnosis if no improvement noted. Thus far serological w/u unrevealing and no proteinuria noted    - Dose medications to eGFR<15 for now    #Hypernatremia  - Na 146 from yest, due to NPO. Encourage free water intake post procedure       #Metabolic acidosis  - NAGMA, 2/2 GIOVANNI  - Monitor bicarb for now, no indication for bicarb therapy at this time

## 2021-05-29 NOTE — PROGRESS NOTE ADULT - ASSESSMENT
75M with gastric ca POD1 s/p left ureteroscopy with biopsy, stent placement  - maintain ureteral stent, will be managed as outpatient by Dr. Holbrook  - trend cr  - fu pathology  - some hematuria to be expected post procedure  - thibodeaux per primary team      I am the covering urologist for Dorian Geiger, and Sheron Shaw MD  [facility]  [address] [place] [id number] [phone]

## 2021-05-29 NOTE — PROGRESS NOTE ADULT - SUBJECTIVE AND OBJECTIVE BOX
Pt seen, feeling well, had a regrograde pyelogram yesterday and had a ureteral stent placed with bx done, cr better today    MEDICATIONS  (STANDING):  heparin   Injectable 5000 Unit(s) SubCutaneous every 8 hours  hydrALAZINE 10 milliGRAM(s) Oral three times a day  latanoprost 0.005% Ophthalmic Solution 1 Drop(s) Both EYES at bedtime  metoclopramide Injectable 5 milliGRAM(s) IV Push three times a day  polyethylene glycol 3350 17 Gram(s) Oral daily  senna 2 Tablet(s) Oral at bedtime  sodium chloride 0.45%. 1000 milliLiter(s) (75 mL/Hr) IV Continuous <Continuous>    MEDICATIONS  (PRN):  acetaminophen   Tablet .. 975 milliGRAM(s) Oral every 8 hours PRN Mild Pain (1 - 3), Moderate Pain (4 - 6)  simethicone 80 milliGRAM(s) Chew every 6 hours PRN Gas      ROS  No fever, sweats, chills  No epistaxis, HA, sore throat  No CP, SOB, cough, sputum  No n/v/d, abd pain, melena, hematochezia  No edema  No rash  No anxiety  No back pain, joint pain  No bleeding, bruising  No dysuria, hematuria    Vital Signs Last 24 Hrs  T(C): 36.8 (29 May 2021 12:12), Max: 36.9 (29 May 2021 04:55)  T(F): 98.2 (29 May 2021 12:12), Max: 98.4 (29 May 2021 04:55)  HR: 74 (29 May 2021 12:12) (68 - 83)  BP: 176/82 (29 May 2021 12:12) (159/80 - 176/82)  BP(mean): --  RR: 17 (29 May 2021 12:12) (16 - 17)  SpO2: 99% (29 May 2021 12:12) (96% - 99%)    PE  NAD  Awake, alert                          11.3   5.24  )-----------( 265      ( 28 May 2021 07:03 )             34.1       05-29    149<H>  |  115<H>  |  25<H>  ----------------------------<  84  4.3   |  21<L>  |  3.27<H>    Ca    9.6      29 May 2021 12:21

## 2021-05-30 LAB
ANION GAP SERPL CALC-SCNC: 14 MMOL/L — SIGNIFICANT CHANGE UP (ref 5–17)
BUN SERPL-MCNC: 26 MG/DL — HIGH (ref 7–23)
CALCIUM SERPL-MCNC: 9.4 MG/DL — SIGNIFICANT CHANGE UP (ref 8.4–10.5)
CHLORIDE SERPL-SCNC: 113 MMOL/L — HIGH (ref 96–108)
CO2 SERPL-SCNC: 19 MMOL/L — LOW (ref 22–31)
CREAT SERPL-MCNC: 3.36 MG/DL — HIGH (ref 0.5–1.3)
GLUCOSE SERPL-MCNC: 74 MG/DL — SIGNIFICANT CHANGE UP (ref 70–99)
HCT VFR BLD CALC: 36 % — LOW (ref 39–50)
HGB BLD-MCNC: 11.8 G/DL — LOW (ref 13–17)
MCHC RBC-ENTMCNC: 28.2 PG — SIGNIFICANT CHANGE UP (ref 27–34)
MCHC RBC-ENTMCNC: 32.8 GM/DL — SIGNIFICANT CHANGE UP (ref 32–36)
MCV RBC AUTO: 85.9 FL — SIGNIFICANT CHANGE UP (ref 80–100)
NRBC # BLD: 0 /100 WBCS — SIGNIFICANT CHANGE UP (ref 0–0)
PLATELET # BLD AUTO: 283 K/UL — SIGNIFICANT CHANGE UP (ref 150–400)
POTASSIUM SERPL-MCNC: 4.1 MMOL/L — SIGNIFICANT CHANGE UP (ref 3.5–5.3)
POTASSIUM SERPL-SCNC: 4.1 MMOL/L — SIGNIFICANT CHANGE UP (ref 3.5–5.3)
RBC # BLD: 4.19 M/UL — LOW (ref 4.2–5.8)
RBC # FLD: 15.7 % — HIGH (ref 10.3–14.5)
SODIUM SERPL-SCNC: 146 MMOL/L — HIGH (ref 135–145)
WBC # BLD: 5.79 K/UL — SIGNIFICANT CHANGE UP (ref 3.8–10.5)
WBC # FLD AUTO: 5.79 K/UL — SIGNIFICANT CHANGE UP (ref 3.8–10.5)

## 2021-05-30 PROCEDURE — 99232 SBSQ HOSP IP/OBS MODERATE 35: CPT

## 2021-05-30 RX ADMIN — HEPARIN SODIUM 5000 UNIT(S): 5000 INJECTION INTRAVENOUS; SUBCUTANEOUS at 21:10

## 2021-05-30 RX ADMIN — Medication 10 MILLIGRAM(S): at 21:11

## 2021-05-30 RX ADMIN — SODIUM CHLORIDE 75 MILLILITER(S): 9 INJECTION, SOLUTION INTRAVENOUS at 05:11

## 2021-05-30 RX ADMIN — Medication 10 MILLIGRAM(S): at 14:37

## 2021-05-30 RX ADMIN — HEPARIN SODIUM 5000 UNIT(S): 5000 INJECTION INTRAVENOUS; SUBCUTANEOUS at 05:11

## 2021-05-30 RX ADMIN — SODIUM CHLORIDE 75 MILLILITER(S): 9 INJECTION, SOLUTION INTRAVENOUS at 14:36

## 2021-05-30 RX ADMIN — Medication 5 MILLIGRAM(S): at 21:09

## 2021-05-30 RX ADMIN — AMLODIPINE BESYLATE 10 MILLIGRAM(S): 2.5 TABLET ORAL at 05:11

## 2021-05-30 RX ADMIN — Medication 10 MILLIGRAM(S): at 05:11

## 2021-05-30 RX ADMIN — Medication 5 MILLIGRAM(S): at 14:37

## 2021-05-30 RX ADMIN — HEPARIN SODIUM 5000 UNIT(S): 5000 INJECTION INTRAVENOUS; SUBCUTANEOUS at 14:36

## 2021-05-30 RX ADMIN — LATANOPROST 1 DROP(S): 0.05 SOLUTION/ DROPS OPHTHALMIC; TOPICAL at 21:10

## 2021-05-30 NOTE — PROGRESS NOTE ADULT - ASSESSMENT
75 yr old male w/ recent diagnosis of stage IV gastric carcinoma sent from [facility] with noted outpt abnormal labs concerning for new onset acute renal failure.     Problem/Plan - 1:  ·  Problem: Acute kidney failure.  Plan: may have component of dehydration/pre renal but unclear cause of rapid decline; pt denies any new medications  - Renal US:< from: US Kidney and Bladder (05.22.21 @ 17:34) >  Increased renal cortical echogenicity bilaterally which may be secondary to medical renal disease.  Multiple bilateral renal cysts, some of which are complex. In particular, a cyst in the left kidney demonstrates peripheral calcification and internal echogenic material of uncertain etiology.  Mild to moderate left hydronephrosis with echogenic material noted within the left renal collecting system. Nonobstructing right intrarenal calculi.  - avoid nephrotoxic agents  - Cr now slwoly improving .. no obvious etiology .. eosinophils positive.  - reviewed CT and lasix scan findings with urology Dr. Carrillo : pt s/p OR 5/28/21 for cystoscopy/ureteroscopy with stenting   - cont current management, Cr slowly improving   - Hypernatremia - encourage oral in take. c/w LR IVF  - Cr now slightly trending up  - repeating US Renal to ensure stent functioning     Problem/Plan - 2:  ·  Problem: Gastric cancer.  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     Problem/Plan - 3:  ·  Problem: HTN (hypertension).  Plan:   BP uncontrolled.   increase Norvasc to 10 mg   add Hydralazine 10 mg TID  (home ARB on hold due to Cr)     Problem/Plan - 4:  ·  Problem: back pain : sec to ruptured ? cyst   improved   no neuro deificits

## 2021-05-30 NOTE — PROGRESS NOTE ADULT - SUBJECTIVE AND OBJECTIVE BOX
Patient seen and examined at bedside. pt feels well. no active complaints     MEDICATIONS  (STANDING):  amLODIPine   Tablet 10 milliGRAM(s) Oral daily  heparin   Injectable 5000 Unit(s) SubCutaneous every 8 hours  hydrALAZINE 10 milliGRAM(s) Oral three times a day  latanoprost 0.005% Ophthalmic Solution 1 Drop(s) Both EYES at bedtime  metoclopramide Injectable 5 milliGRAM(s) IV Push three times a day  polyethylene glycol 3350 17 Gram(s) Oral daily  senna 2 Tablet(s) Oral at bedtime  sodium chloride 0.45%. 1000 milliLiter(s) (75 mL/Hr) IV Continuous <Continuous>    MEDICATIONS  (PRN):  acetaminophen   Tablet .. 975 milliGRAM(s) Oral every 8 hours PRN Mild Pain (1 - 3), Moderate Pain (4 - 6)  simethicone 80 milliGRAM(s) Chew every 6 hours PRN Gas        Vital Signs Last 24 Hrs  T(C): 36.9 (30 May 2021 05:03), Max: 36.9 (30 May 2021 05:03)  T(F): 98.4 (30 May 2021 05:03), Max: 98.4 (30 May 2021 05:03)  HR: 77 (30 May 2021 05:03) (74 - 81)  BP: 165/86 (30 May 2021 05:03) (165/86 - 176/82)  BP(mean): --  RR: 18 (30 May 2021 05:03) (17 - 18)  SpO2: 96% (30 May 2021 05:03) (96% - 99%)      PHYSICAL EXAM:     GENERAL:  Appears stated age, well-groomed  HEENT:  NC/AT,    CHEST:  CTA b/l  HEART:  S1 s2+   ABDOMEN:  Soft, non-tender, non-distended  EXTEREMITIES:  no cyanosis,clubbing or edema  NEURO:  Alert, oriented, no asterixis                            11.8   5.79  )-----------( 283      ( 30 May 2021 07:33 )             36.0       05-30    146<H>  |  113<H>  |  26<H>  ----------------------------<  74  4.1   |  19<L>  |  3.36<H>    Ca    9.4      30 May 2021 07:30

## 2021-05-30 NOTE — PROGRESS NOTE ADULT - SUBJECTIVE AND OBJECTIVE BOX
SUBJECTIVE / OVERNIGHT EVENTS:  --- Coverage for Dr. Mullins ---   Feeling better.  No overnight event.  No complaints.  Chest pain free. no SOB, no N/V/D.  Denied HA/dizziness, abdominal pain.   Cr slightly trending up  repeating US Renal to ensure stent functioning       --------------------------------------------------------------------------------------------  LABS:                        11.8   5.79  )-----------( 283      ( 30 May 2021 07:33 )             36.0     05-30    146<H>  |  113<H>  |  26<H>  ----------------------------<  74  4.1   |  19<L>  |  3.36<H>    Ca    9.4      30 May 2021 07:30        CAPILLARY BLOOD GLUCOSE                RADIOLOGY & ADDITIONAL TESTS:    Imaging Personally Reviewed:  [x] YES  [ ] NO    Consultant(s) Notes Reviewed:  [x] YES  [ ] NO    MEDICATIONS  (STANDING):  amLODIPine   Tablet 10 milliGRAM(s) Oral daily  heparin   Injectable 5000 Unit(s) SubCutaneous every 8 hours  hydrALAZINE 10 milliGRAM(s) Oral three times a day  latanoprost 0.005% Ophthalmic Solution 1 Drop(s) Both EYES at bedtime  metoclopramide Injectable 5 milliGRAM(s) IV Push three times a day  polyethylene glycol 3350 17 Gram(s) Oral daily  senna 2 Tablet(s) Oral at bedtime    MEDICATIONS  (PRN):  acetaminophen   Tablet .. 975 milliGRAM(s) Oral every 8 hours PRN Mild Pain (1 - 3), Moderate Pain (4 - 6)  simethicone 80 milliGRAM(s) Chew every 6 hours PRN Gas      Care Discussed with Consultants/Other Providers [x] YES  [ ] NO    Vital Signs Last 24 Hrs  T(C): 36.8 (30 May 2021 20:03), Max: 36.9 (30 May 2021 05:03)  T(F): 98.2 (30 May 2021 20:03), Max: 98.5 (30 May 2021 14:26)  HR: 91 (30 May 2021 20:03) (77 - 91)  BP: 113/73 (30 May 2021 20:03) (113/73 - 165/86)  BP(mean): --  RR: 17 (30 May 2021 20:03) (17 - 18)  SpO2: 100% (30 May 2021 20:03) (96% - 100%)  I&O's Summary    29 May 2021 07:01  -  30 May 2021 07:00  --------------------------------------------------------  IN: 1150 mL / OUT: 1300 mL / NET: -150 mL    30 May 2021 07:01  -  31 May 2021 00:51  --------------------------------------------------------  IN: 120 mL / OUT: 850 mL / NET: -730 mL      PHYSICAL EXAM:  GENERAL: NAD, well-developed, comfortable  HEAD:  Atraumatic, Normocephalic  EYES: EOMI, PERRLA, conjunctiva and sclera clear  NECK: Supple, No JVD  CHEST/LUNG: Clear to auscultation bilaterally; No wheeze  HEART: Regular rate and rhythm; No murmurs, rubs, or gallops  ABDOMEN: Soft, Nontender, Nondistended; Bowel sounds present  : Ramon in place, aaron color urine, neg CVAT   Neuro: AAOx3, no focal weakness, 5/5 b/l extremity strength  EXTREMITIES:  2+ Peripheral Pulses, No clubbing, cyanosis, or edema  SKIN: No rashes or lesions

## 2021-05-30 NOTE — PROGRESS NOTE ADULT - SUBJECTIVE AND OBJECTIVE BOX
[facility] DIVISION OF KIDNEY DISEASES AND HYPERTENSION -- FOLLOW UP NOTE  --------------------------------------------------------------------------------  Trent Rosales   Nephrology Fellow  Pager NS: [phone]/ [facility]: [phone]  (After 5 pm or on weekends please page the on-call fellow, can view the schedule on iKoa. Login is kita lr, schedule under [facility] medicine, psych, derm.      Patient is a 75y old  Male who presents with a chief complaint of GIOVANNI (30 May 2021 10:00)      24 hour events/subjective: Patient seen and examined at the bedside. Vital signs, labs, medications reviewed. Pt feels better today. Denies nausea/vomiting. Non-oliguric with stable Cr of 3.3mg/dl        PAST HISTORY  --------------------------------------------------------------------------------  No significant changes to PMH, PSH, FHx, SHx, unless otherwise noted    ALLERGIES & MEDICATIONS  --------------------------------------------------------------------------------  Allergies    aspirin (Rash)    Intolerances      Standing Inpatient Medications  amLODIPine   Tablet 10 milliGRAM(s) Oral daily  heparin   Injectable 5000 Unit(s) SubCutaneous every 8 hours  hydrALAZINE 10 milliGRAM(s) Oral three times a day  latanoprost 0.005% Ophthalmic Solution 1 Drop(s) Both EYES at bedtime  metoclopramide Injectable 5 milliGRAM(s) IV Push three times a day  polyethylene glycol 3350 17 Gram(s) Oral daily  senna 2 Tablet(s) Oral at bedtime  sodium chloride 0.45%. 1000 milliLiter(s) IV Continuous <Continuous>    PRN Inpatient Medications  acetaminophen   Tablet .. 975 milliGRAM(s) Oral every 8 hours PRN  simethicone 80 milliGRAM(s) Chew every 6 hours PRN      REVIEW OF SYSTEMS  --------------------------------------------------------------------------------  Gen: No fevers/chills  Skin: No rashes  Head/Eyes/Ears: Normal hearing, no difficulty seeing  Respiratory: No dyspnea, cough  CV: No chest pain  GI: No abdominal pain, diarrhea  : No dysuria, hematuria  MSK: No  edema      >>> <<<    VITALS/PHYSICAL EXAM  --------------------------------------------------------------------------------  T(C): 36.9 (05-30-21 @ 05:03), Max: 36.9 (05-30-21 @ 05:03)  HR: 77 (05-30-21 @ 05:03) (74 - 81)  BP: 165/86 (05-30-21 @ 05:03) (165/86 - 176/82)  RR: 18 (05-30-21 @ 05:03) (17 - 18)  SpO2: 96% (05-30-21 @ 05:03) (96% - 99%)  Wt(kg): --  Height (cm): 170.2 (05-28-21 @ 11:52)  Weight (kg): 69.9 (05-28-21 @ 11:52)  BMI (kg/m2): 24.1 (05-28-21 @ 11:52)  BSA (m2): 1.81 (05-28-21 @ 11:52)      05-29-21 @ 07:01  -  05-30-21 @ 07:00  --------------------------------------------------------  IN: 1150 mL / OUT: 1300 mL / NET: -150 mL      Physical Exam:    	Gen: NAD  	HEENT: Anicteric  	Pulm: CTA B/L  	CV: S1S2  	Abd: Soft, +BS   	MSK: No LE edema B/L  : + thibodeaux with clear urine  	Neuro: Awake  	Skin: Warm and dry  	Vascular access:      LABS/STUDIES  --------------------------------------------------------------------------------              11.8   5.79  >-----------<  283      [05-30-21 @ 07:33]              36.0     146  |  113  |  26  ----------------------------<  74      [05-30-21 @ 07:30]  4.1   |  19  |  3.36        Ca     9.4     [05-30-21 @ 07:30]            Creatinine Trend:  SCr 3.36 [05-30 @ 07:30]  SCr 3.27 [05-29 @ 12:21]  SCr 3.68 [05-28 @ 07:00]  SCr 3.90 [05-27 @ 07:08]  SCr 3.75 [05-26 @ 10:48]    Urinalysis - [05-25-21 @ 07:49]      Color Light Yellow / Appearance Clear / SG 1.014 / pH 6.0      Gluc Negative / Ketone Small  / Bili Negative / Urobili Negative       Blood Moderate / Protein Trace / Leuk Est Large / Nitrite Negative      RBC 11 / WBC 7 / Hyaline 0 / Gran  / Sq Epi  / Non Sq Epi 0 / Bacteria Negative    Urine Creatinine 107      [05-25-21 @ 07:50]  Urine Protein 18      [05-25-21 @ 07:50]  Urine Sodium 96      [05-25-21 @ 07:50]  Urine Potassium 23      [05-25-21 @ 07:50]  Urine Chloride 76      [05-25-21 @ 07:50]      HBsAg Nonreact      [05-25-21 @ 15:14]  HCV 0.84, Grayzone      [05-25-21 @ 15:14]    dsDNA <12      [05-25-21 @ 15:16]  C3 Complement 113      [05-25-21 @ 15:35]  C4 Complement 36      [05-25-21 @ 15:35]  MPO-ANCA <5.0, interpretation: Negative      [05-26-21 @ 08:40]  PR3-ANCA 5.1, interpretation: Negative      [05-26-21 @ 08:40]  anti-GBM 3      [05-25-21 @ 17:51]  Free Light Chains: kappa 4.90, lambda 2.72, ratio = 1.80      [05-26 @ 08:40]  Immunofixation Serum:   No Monoclonal Band Identified    Reference Range: None Detected      [05-26-21 @ 08:40]  SPEP Interpretation: Normal Electrophoresis Pattern      [05-26-21 @ 08:40]

## 2021-05-30 NOTE — PROGRESS NOTE ADULT - ASSESSMENT
75 yr old male w/ recent diagnosis of stage IV gastric carcinoma sent from [facility] [place] with abnormal labs, notably elevated sCr.     Stage IV Gastric Cancer  --Seen by Dr. Kendall Arellano at [facility] [place]  --Treatment per Dr. Arellano after optimization of renal function and discharge  --S/P echocardiogram per Dr. Arellano's request prior to chemo - normal LVEF    Acute Kidney Injury -- noted to have mild to mod L hydronephrosis with echogenic material in the L renal collecting system  --Nephrology Consult appreciated  --Cr improving s/p ureteral stent and bx  -- Cr is 3.3 today   --f/u     Vince Winchester MD  Hematology Oncology   O:   C: [phone]

## 2021-05-30 NOTE — PROGRESS NOTE ADULT - ASSESSMENT
75y M with gastric cancer presenting with acute renal failure    #GIOVANNI  - Pt reportedly had normal renal fxn several weeks ago. Now presenting with Cr of 4mg/dl in setting of poor Po intake and ? ARB use. Possibly now with ATN vs. obstructive disease  - UA relatively bland, no significant proteinuria  - Cr stable at 3.6-3.9mg/dl  - Renal US findings noted, patient with multiple renal cysts b/l and likely underlying cystic disease but he does not have PCKD as his renal failure is acute, his Cr was .66mg/dl back on 5/14 and acutely eliot to 3.97mg/dl on 5/17 which would not be consistent with a hx of PCKD.  - Left renal collecting system with moderate hydronephrosis with echogenic material despite thibodeaux, appreciate urology recs, he is s/p L ureteral stent and biopsy. Initially with improving UO, 200cc/hr, and Cr down to 3.2mg/dl yest but today his UO is less. Would repeat the renal Ultrasound on 5/31 as his stent could have failed and he would then benefit from a NT  - Agree with IVF, c/w LR 50cc/hr  - Discussed with the daughter/wife the possibility of a kidney biopsy for prognostication and diagnosis if no improvement noted. Thus far serological w/u unrevealing and no proteinuria noted    - Dose medications to eGFR<15 for now    #Hypernatremia  - Na 146.  Encourage free water intake post procedure       #Metabolic acidosis  - NAGMA, 2/2 GIOVANNI  - Monitor bicarb for now, no indication for bicarb therapy at this time

## 2021-05-31 LAB
ANA PAT FLD IF-IMP: ABNORMAL
ANA PATTERN 2: ABNORMAL
ANA TITR SER: ABNORMAL
ANION GAP SERPL CALC-SCNC: 14 MMOL/L — SIGNIFICANT CHANGE UP (ref 5–17)
ANTI NUCLEAR FACTOR TITER 2: ABNORMAL
BUN SERPL-MCNC: 25 MG/DL — HIGH (ref 7–23)
CALCIUM SERPL-MCNC: 9.5 MG/DL — SIGNIFICANT CHANGE UP (ref 8.4–10.5)
CHLORIDE SERPL-SCNC: 115 MMOL/L — HIGH (ref 96–108)
CO2 SERPL-SCNC: 19 MMOL/L — LOW (ref 22–31)
CREAT SERPL-MCNC: 2.81 MG/DL — HIGH (ref 0.5–1.3)
GLUCOSE SERPL-MCNC: 76 MG/DL — SIGNIFICANT CHANGE UP (ref 70–99)
HCT VFR BLD CALC: 32.5 % — LOW (ref 39–50)
HGB BLD-MCNC: 10.7 G/DL — LOW (ref 13–17)
MCHC RBC-ENTMCNC: 28.2 PG — SIGNIFICANT CHANGE UP (ref 27–34)
MCHC RBC-ENTMCNC: 32.9 GM/DL — SIGNIFICANT CHANGE UP (ref 32–36)
MCV RBC AUTO: 85.5 FL — SIGNIFICANT CHANGE UP (ref 80–100)
NRBC # BLD: 0 /100 WBCS — SIGNIFICANT CHANGE UP (ref 0–0)
PLATELET # BLD AUTO: 278 K/UL — SIGNIFICANT CHANGE UP (ref 150–400)
POTASSIUM SERPL-MCNC: 3.9 MMOL/L — SIGNIFICANT CHANGE UP (ref 3.5–5.3)
POTASSIUM SERPL-SCNC: 3.9 MMOL/L — SIGNIFICANT CHANGE UP (ref 3.5–5.3)
RBC # BLD: 3.8 M/UL — LOW (ref 4.2–5.8)
RBC # FLD: 15.5 % — HIGH (ref 10.3–14.5)
SODIUM SERPL-SCNC: 148 MMOL/L — HIGH (ref 135–145)
WBC # BLD: 6.06 K/UL — SIGNIFICANT CHANGE UP (ref 3.8–10.5)
WBC # FLD AUTO: 6.06 K/UL — SIGNIFICANT CHANGE UP (ref 3.8–10.5)

## 2021-05-31 PROCEDURE — 99232 SBSQ HOSP IP/OBS MODERATE 35: CPT

## 2021-05-31 PROCEDURE — 76770 US EXAM ABDO BACK WALL COMP: CPT | Mod: 26

## 2021-05-31 RX ADMIN — Medication 10 MILLIGRAM(S): at 21:22

## 2021-05-31 RX ADMIN — HEPARIN SODIUM 5000 UNIT(S): 5000 INJECTION INTRAVENOUS; SUBCUTANEOUS at 05:48

## 2021-05-31 RX ADMIN — HEPARIN SODIUM 5000 UNIT(S): 5000 INJECTION INTRAVENOUS; SUBCUTANEOUS at 13:34

## 2021-05-31 RX ADMIN — Medication 5 MILLIGRAM(S): at 13:34

## 2021-05-31 RX ADMIN — HEPARIN SODIUM 5000 UNIT(S): 5000 INJECTION INTRAVENOUS; SUBCUTANEOUS at 21:22

## 2021-05-31 RX ADMIN — Medication 10 MILLIGRAM(S): at 05:48

## 2021-05-31 RX ADMIN — POLYETHYLENE GLYCOL 3350 17 GRAM(S): 17 POWDER, FOR SOLUTION ORAL at 12:04

## 2021-05-31 RX ADMIN — Medication 5 MILLIGRAM(S): at 05:48

## 2021-05-31 RX ADMIN — Medication 10 MILLIGRAM(S): at 13:34

## 2021-05-31 RX ADMIN — LATANOPROST 1 DROP(S): 0.05 SOLUTION/ DROPS OPHTHALMIC; TOPICAL at 21:22

## 2021-05-31 RX ADMIN — Medication 5 MILLIGRAM(S): at 21:22

## 2021-05-31 RX ADMIN — AMLODIPINE BESYLATE 10 MILLIGRAM(S): 2.5 TABLET ORAL at 05:48

## 2021-05-31 NOTE — PROGRESS NOTE ADULT - SUBJECTIVE AND OBJECTIVE BOX
Pt is seen and examined  pt is awake and lying in bed  no new complaints  SCr improving      PAST MEDICAL & SURGICAL HISTORY:  HTN (hypertension)    Polycystic kidney disease  ??    Raynauds syndrome    Gastric cancer  stage IV    Cataract        ROS:  Negative except for:    MEDICATIONS  (STANDING):  amLODIPine   Tablet 10 milliGRAM(s) Oral daily  heparin   Injectable 5000 Unit(s) SubCutaneous every 8 hours  hydrALAZINE 10 milliGRAM(s) Oral three times a day  latanoprost 0.005% Ophthalmic Solution 1 Drop(s) Both EYES at bedtime  metoclopramide Injectable 5 milliGRAM(s) IV Push three times a day  polyethylene glycol 3350 17 Gram(s) Oral daily  senna 2 Tablet(s) Oral at bedtime    MEDICATIONS  (PRN):  acetaminophen   Tablet .. 975 milliGRAM(s) Oral every 8 hours PRN Mild Pain (1 - 3), Moderate Pain (4 - 6)  simethicone 80 milliGRAM(s) Chew every 6 hours PRN Gas      Allergies    aspirin (Rash)    Intolerances        Vital Signs Last 24 Hrs  T(C): 36.8 (31 May 2021 05:02), Max: 36.9 (30 May 2021 14:26)  T(F): 98.2 (31 May 2021 05:02), Max: 98.5 (30 May 2021 14:26)  HR: 75 (31 May 2021 05:02) (75 - 91)  BP: 137/73 (31 May 2021 05:02) (113/73 - 137/73)  BP(mean): --  RR: 17 (31 May 2021 05:02) (17 - 18)  SpO2: 96% (31 May 2021 05:02) (96% - 100%)    PHYSICAL EXAM  General: chronically Ill appearing Male In NAD  HEENT: clear oropharynx, anicteric sclera, pink conjunctiva  Neck: supple  CV: normal S1/S2 with no murmur rubs or gallops  Lungs: positive air movement b/l ant lungs,clear to auscultation, no wheezes, no rales  Abdomen: soft non-tender non-distended, no hepatosplenomegaly  Ext: no clubbing cyanosis or edema     LABS:                          10.7   6.06  )-----------( 278      ( 31 May 2021 06:53 )             32.5     Serial CBC's  05-31 @ 06:53  Hct-32.5 / Hgb-10.7 / Plat-278 / RBC-3.80 / WBC-6.06          Serial CBC's  05-30 @ 07:33  Hct-36.0 / Hgb-11.8 / Plat-283 / RBC-4.19 / WBC-5.79            05-31    148<H>  |  115<H>  |  25<H>  ----------------------------<  76  3.9   |  19<L>  |  2.81<H>    Ca    9.5      31 May 2021 06:53            WBC Count: 6.06 K/uL (05-31 @ 06:53)  Hemoglobin: 10.7 g/dL (05-31 @ 06:53)            RADIOLOGY & ADDITIONAL STUDIES:

## 2021-05-31 NOTE — PROGRESS NOTE ADULT - SUBJECTIVE AND OBJECTIVE BOX
SUBJECTIVE / OVERNIGHT EVENTS:  --- Coverage for Dr. Mullins ---   Feeling better today.  Cr improves  No complaints.  Chest pain free. no SOB, no N/V/D.  Denied HA/dizziness, abdominal pain.   repeat US renal done, pending read  d/w pt's wife and plan updated.    --------------------------------------------------------------------------------------------  LABS:                        10.7   6.06  )-----------( 278      ( 31 May 2021 06:53 )             32.5     05-31    148<H>  |  115<H>  |  25<H>  ----------------------------<  76  3.9   |  19<L>  |  2.81<H>    Ca    9.5      31 May 2021 06:53        CAPILLARY BLOOD GLUCOSE                RADIOLOGY & ADDITIONAL TESTS:    Imaging Personally Reviewed:  [x] YES  [ ] NO    Consultant(s) Notes Reviewed:  [x] YES  [ ] NO    MEDICATIONS  (STANDING):  amLODIPine   Tablet 10 milliGRAM(s) Oral daily  heparin   Injectable 5000 Unit(s) SubCutaneous every 8 hours  hydrALAZINE 10 milliGRAM(s) Oral three times a day  latanoprost 0.005% Ophthalmic Solution 1 Drop(s) Both EYES at bedtime  metoclopramide Injectable 5 milliGRAM(s) IV Push three times a day  polyethylene glycol 3350 17 Gram(s) Oral daily  senna 2 Tablet(s) Oral at bedtime    MEDICATIONS  (PRN):  acetaminophen   Tablet .. 975 milliGRAM(s) Oral every 8 hours PRN Mild Pain (1 - 3), Moderate Pain (4 - 6)  simethicone 80 milliGRAM(s) Chew every 6 hours PRN Gas      Care Discussed with Consultants/Other Providers [x] YES  [ ] NO    Vital Signs Last 24 Hrs  T(C): 37 (31 May 2021 12:12), Max: 37 (31 May 2021 12:12)  T(F): 98.6 (31 May 2021 12:12), Max: 98.6 (31 May 2021 12:12)  HR: 79 (31 May 2021 12:12) (75 - 91)  BP: 135/78 (31 May 2021 12:12) (113/73 - 137/73)  BP(mean): --  RR: 17 (31 May 2021 12:12) (17 - 17)  SpO2: 94% (31 May 2021 12:12) (94% - 100%)  I&O's Summary    30 May 2021 07:01  -  31 May 2021 07:00  --------------------------------------------------------  IN: 120 mL / OUT: 850 mL / NET: -730 mL      PHYSICAL EXAM:  GENERAL: NAD, well-developed, comfortable  HEAD:  Atraumatic, Normocephalic  EYES: EOMI, PERRLA, conjunctiva and sclera clear  NECK: Supple, No JVD  CHEST/LUNG: Clear to auscultation bilaterally; No wheeze  HEART: Regular rate and rhythm; No murmurs, rubs, or gallops  ABDOMEN: Soft, Nontender, Nondistended; Bowel sounds present  : Ramon in place, aaron color urine, neg CVAT   Neuro: AAOx3, no focal weakness, 5/5 b/l extremity strength  EXTREMITIES:  2+ Peripheral Pulses, No clubbing, cyanosis, or edema  SKIN: No rashes or lesions

## 2021-05-31 NOTE — PROGRESS NOTE ADULT - ASSESSMENT
1. Stage IV Gastric Cancer    -- [place] [facility] [place]  --Treatment per Dr. Arellano after optimization of renal function and discharge  --S/P echocardiogram per Dr. Arellano's request prior to chemo - normal LVEF    2. Acute Kidney Injury     -- noted to have mild to mod L hydronephrosis with echogenic material in the L renal collecting system  --Cr improving s/p ureteral stent and bx  -- Cr is  2.81 today  -- await renal sonogram as per nephrology  -- f/u     3. Anemia    -- multifactorial, sec to CKD and Malignancy  -- counts adequate. Monitor for now    Edu Chan MD  [phone]

## 2021-05-31 NOTE — PROGRESS NOTE ADULT - ASSESSMENT
75y M with gastric cancer presenting with acute renal failure    #GIOVANNI  - Pt reportedly had normal renal fxn several weeks ago. Now presenting with Cr of 4mg/dl in setting of poor Po intake and ? ARB use. Possibly now with ATN vs. obstructive disease  - UA relatively bland, no significant proteinuria  - Cr improving 2.8  - Renal US findings noted, patient with multiple renal cysts b/l and likely underlying cystic disease but he does not have PCKD as his renal failure is acute, his Cr was .66mg/dl back on 5/14 and acutely eliot to 3.97mg/dl on 5/17 which would not be consistent with a hx of PCKD.  - Left renal collecting system with moderate hydronephrosis with echogenic material despite thibodeaux, appreciate urology recs, he is s/p L ureteral stent and biopsy. Initially with improving UO, 200cc/hr, and Cr down to 3.2mg/dl yest but today his UO is less. Would repeat the renal Ultrasound on 5/31 as his stent could have failed and he would then benefit from a NT  - Agree with IVF, c/w LR 50cc/hr  - Discussed with the daughter/wife the possibility of a kidney biopsy for prognostication and diagnosis if no improvement noted. Thus far serological w/u unrevealing and no proteinuria noted    - Dose medications to eGFR<15 for now    #Hypernatremia  - Na 148.  Encourage free water intake post procedure, if worsen then will need consider D5W      #Metabolic acidosis  - NAGMA, 2/2 GIOVANNI  - Monitor bicarb for now, no indication for bicarb therapy at this time 75y M with gastric cancer presenting with acute renal failure    #GIOVANNI  - Pt reportedly had normal renal fxn several weeks ago. Now presenting with Cr of 4mg/dl in setting of poor Po intake and ? ARB use. Possibly now with ATN vs. obstructive disease  - UA relatively bland, no significant proteinuria  - Cr improving 2.8  - Renal US findings noted, patient with multiple renal cysts b/l and likely underlying cystic disease but he does not have PCKD as his renal failure is acute, his Cr was .66mg/dl back on 5/14 and acutely eliot to 3.97mg/dl on 5/17 which would not be consistent with a hx of PCKD.  - Left renal collecting system with moderate hydronephrosis with echogenic material despite thibodeaux, appreciate urology recs, he is s/p L ureteral stent and biopsy. Initially with improving UO, 200cc/hr, and Cr down to 2.8 range and improving. Good response to the stent likely.   - Agree with IVF, c/w LR 50cc/hr  - Dose medications to eGFR<15 for now  - No renal bx at this point    #Hypernatremia  - Na 148.  Encourage free water intake post procedure, if worsen then will need consider D5W      #Metabolic acidosis  - NAGMA, 2/2 GIOVANNI  - Monitor bicarb for now, no indication for bicarb therapy at this time

## 2021-05-31 NOTE — PROGRESS NOTE ADULT - SUBJECTIVE AND OBJECTIVE BOX
[facility] DIVISION OF KIDNEY DISEASES AND HYPERTENSION   -- FOLLOW UP NOTE --   Venkatesh Carbajal  Nephrology Fellow  Pager NS: [phone]   /  Pager [facility]: [phone]  (after 5pm or weekend please page the on-call fellow via [email or web address])  ======================================================  24 hour events/subjective:  - overnight no events reported, vitals afebrile no hypotensive episode, total  cc in the past 24hr  - patient seen and examined at bedside this morning without complaints  - vitals/lab/medications reviewed, noted for improving sCr 3.3 to 2.8    PAST HISTORY  --------------------------------------------------------------------------------  No significant changes to PMH, PSH, FHx, SHx, unless otherwise noted    ALLERGIES & MEDICATIONS  --------------------------------------------------------------------------------  Allergies  aspirin (Rash)    Intolerances    Standing Inpatient Medications  amLODIPine   Tablet 10 milliGRAM(s) Oral daily  heparin   Injectable 5000 Unit(s) SubCutaneous every 8 hours  hydrALAZINE 10 milliGRAM(s) Oral three times a day  latanoprost 0.005% Ophthalmic Solution 1 Drop(s) Both EYES at bedtime  metoclopramide Injectable 5 milliGRAM(s) IV Push three times a day  polyethylene glycol 3350 17 Gram(s) Oral daily  senna 2 Tablet(s) Oral at bedtime    PRN Inpatient Medications  acetaminophen   Tablet .. 975 milliGRAM(s) Oral every 8 hours PRN  simethicone 80 milliGRAM(s) Chew every 6 hours PRN    REVIEW OF SYSTEMS  --------------------------------------------------------------------------------  Gen: no fever, chills, weakness  Respiratory: No dyspnea, cough  CV: No chest pain, orthopnea  GI: No abdominal pain, nausea, vomiting, diarrhea  MSK: no edema  Neuro: No dizziness, lightheadedness  All other systems were reviewed and are negative, except as noted.    VITALS/PHYSICAL EXAM  --------------------------------------------------------------------------------  T(C): 36.8 (05-31-21 @ 05:02), Max: 36.9 (05-30-21 @ 14:26)  HR: 75 (05-31-21 @ 05:02) (75 - 91)  BP: 137/73 (05-31-21 @ 05:02) (113/73 - 137/73)  RR: 17 (05-31-21 @ 05:02) (17 - 18)  SpO2: 96% (05-31-21 @ 05:02) (96% - 100%)  Wt(kg): --    05-30-21 @ 07:01  -  05-31-21 @ 07:00  --------------------------------------------------------  IN: 120 mL / OUT: 850 mL / NET: -730 mL      Physical Exam:  	Gen: NAD on room air  	Pulm: CTA B/L  	CV: S1S2, RRR  	Abd: Soft, +BS   	MSK: No LE edema B/L              : + thibodeaux catheter in place  	Neuro: Awake, alert  	Skin: Warm and dry    LABS/STUDIES  --------------------------------------------------------------------------------              10.7   6.06  >-----------<  278      [05-31-21 @ 06:53]              32.5     148  |  115  |  25  ----------------------------<  76      [05-31-21 @ 06:53]  3.9   |  19  |  2.81        Ca     9.5     [05-31-21 @ 06:53]    Creatinine Trend:  SCr 2.81 [05-31 @ 06:53]  SCr 3.36 [05-30 @ 07:30]  SCr 3.27 [05-29 @ 12:21]  SCr 3.68 [05-28 @ 07:00]  SCr 3.90 [05-27 @ 07:08]    Urinalysis - [05-25-21 @ 07:49]      Color Light Yellow / Appearance Clear / SG 1.014 / pH 6.0      Gluc Negative / Ketone Small  / Bili Negative / Urobili Negative       Blood Moderate / Protein Trace / Leuk Est Large / Nitrite Negative      RBC 11 / WBC 7 / Hyaline 0 / Gran  / Sq Epi  / Non Sq Epi 0 / Bacteria Negative    Urine Creatinine 107      [05-25-21 @ 07:50]  Urine Protein 18      [05-25-21 @ 07:50]  Urine Sodium 96      [05-25-21 @ 07:50]  Urine Potassium 23      [05-25-21 @ 07:50]  Urine Chloride 76      [05-25-21 @ 07:50]    HBsAg Nonreact      [05-25-21 @ 15:14]  HCV 0.84, Grayzone      [05-25-21 @ 15:14]    dsDNA <12      [05-25-21 @ 15:16]  C3 Complement 113      [05-25-21 @ 15:35]  C4 Complement 36      [05-25-21 @ 15:35]  MPO-ANCA <5.0, interpretation: Negative      [05-26-21 @ 08:40]  PR3-ANCA 5.1, interpretation: Negative      [05-26-21 @ 08:40]  anti-GBM 3      [05-25-21 @ 17:51]  Free Light Chains: kappa 4.90, lambda 2.72, ratio = 1.80      [05-26 @ 08:40]  Immunofixation Serum:   No Monoclonal Band Identified    Reference Range: None Detected      [05-26-21 @ 08:40]  SPEP Interpretation: Normal Electrophoresis Pattern      [05-26-21 @ 08:40]

## 2021-06-01 LAB
ANION GAP SERPL CALC-SCNC: 15 MMOL/L — SIGNIFICANT CHANGE UP (ref 5–17)
AUTO DIFF PNL BLD: ABNORMAL
BUN SERPL-MCNC: 24 MG/DL — HIGH (ref 7–23)
C-ANCA SER-ACNC: NEGATIVE — SIGNIFICANT CHANGE UP
CALCIUM SERPL-MCNC: 9.9 MG/DL — SIGNIFICANT CHANGE UP (ref 8.4–10.5)
CHLORIDE SERPL-SCNC: 116 MMOL/L — HIGH (ref 96–108)
CO2 SERPL-SCNC: 19 MMOL/L — LOW (ref 22–31)
CREAT SERPL-MCNC: 2.58 MG/DL — HIGH (ref 0.5–1.3)
GLUCOSE SERPL-MCNC: 77 MG/DL — SIGNIFICANT CHANGE UP (ref 70–99)
HCT VFR BLD CALC: 35.7 % — LOW (ref 39–50)
HGB BLD-MCNC: 11.7 G/DL — LOW (ref 13–17)
MCHC RBC-ENTMCNC: 28.1 PG — SIGNIFICANT CHANGE UP (ref 27–34)
MCHC RBC-ENTMCNC: 32.8 GM/DL — SIGNIFICANT CHANGE UP (ref 32–36)
MCV RBC AUTO: 85.6 FL — SIGNIFICANT CHANGE UP (ref 80–100)
NRBC # BLD: 0 /100 WBCS — SIGNIFICANT CHANGE UP (ref 0–0)
P-ANCA SER-ACNC: NEGATIVE — SIGNIFICANT CHANGE UP
PHOSPHOLIPASE A2 RECEPTOR ELISA: <1.8 RU/ML — SIGNIFICANT CHANGE UP (ref 0–19.9)
PLATELET # BLD AUTO: 295 K/UL — SIGNIFICANT CHANGE UP (ref 150–400)
POTASSIUM SERPL-MCNC: 4.1 MMOL/L — SIGNIFICANT CHANGE UP (ref 3.5–5.3)
POTASSIUM SERPL-SCNC: 4.1 MMOL/L — SIGNIFICANT CHANGE UP (ref 3.5–5.3)
RBC # BLD: 4.17 M/UL — LOW (ref 4.2–5.8)
RBC # FLD: 15.6 % — HIGH (ref 10.3–14.5)
SODIUM SERPL-SCNC: 150 MMOL/L — HIGH (ref 135–145)
WBC # BLD: 6.46 K/UL — SIGNIFICANT CHANGE UP (ref 3.8–10.5)
WBC # FLD AUTO: 6.46 K/UL — SIGNIFICANT CHANGE UP (ref 3.8–10.5)

## 2021-06-01 PROCEDURE — 99232 SBSQ HOSP IP/OBS MODERATE 35: CPT | Mod: GC

## 2021-06-01 RX ORDER — SODIUM CHLORIDE 9 MG/ML
1000 INJECTION, SOLUTION INTRAVENOUS
Refills: 0 | Status: DISCONTINUED | OUTPATIENT
Start: 2021-06-01 | End: 2021-06-03

## 2021-06-01 RX ORDER — SODIUM CHLORIDE 9 MG/ML
1000 INJECTION, SOLUTION INTRAVENOUS
Refills: 0 | Status: DISCONTINUED | OUTPATIENT
Start: 2021-06-01 | End: 2021-06-01

## 2021-06-01 RX ADMIN — Medication 5 MILLIGRAM(S): at 19:18

## 2021-06-01 RX ADMIN — HEPARIN SODIUM 5000 UNIT(S): 5000 INJECTION INTRAVENOUS; SUBCUTANEOUS at 21:57

## 2021-06-01 RX ADMIN — SODIUM CHLORIDE 100 MILLILITER(S): 9 INJECTION, SOLUTION INTRAVENOUS at 15:16

## 2021-06-01 RX ADMIN — HEPARIN SODIUM 5000 UNIT(S): 5000 INJECTION INTRAVENOUS; SUBCUTANEOUS at 05:44

## 2021-06-01 RX ADMIN — HEPARIN SODIUM 5000 UNIT(S): 5000 INJECTION INTRAVENOUS; SUBCUTANEOUS at 13:33

## 2021-06-01 RX ADMIN — POLYETHYLENE GLYCOL 3350 17 GRAM(S): 17 POWDER, FOR SOLUTION ORAL at 13:33

## 2021-06-01 RX ADMIN — AMLODIPINE BESYLATE 10 MILLIGRAM(S): 2.5 TABLET ORAL at 05:43

## 2021-06-01 RX ADMIN — Medication 5 MILLIGRAM(S): at 13:32

## 2021-06-01 RX ADMIN — Medication 10 MILLIGRAM(S): at 05:43

## 2021-06-01 RX ADMIN — Medication 5 MILLIGRAM(S): at 05:43

## 2021-06-01 RX ADMIN — SODIUM CHLORIDE 50 MILLILITER(S): 9 INJECTION, SOLUTION INTRAVENOUS at 10:57

## 2021-06-01 RX ADMIN — LATANOPROST 1 DROP(S): 0.05 SOLUTION/ DROPS OPHTHALMIC; TOPICAL at 21:57

## 2021-06-01 RX ADMIN — SODIUM CHLORIDE 100 MILLILITER(S): 9 INJECTION, SOLUTION INTRAVENOUS at 20:00

## 2021-06-01 RX ADMIN — Medication 10 MILLIGRAM(S): at 13:32

## 2021-06-01 NOTE — PROGRESS NOTE ADULT - ASSESSMENT
1. Stage IV Gastric Cancer    -- [place] Dr. Kendall Arellano at [facility] [place]  --Treatment per Dr. Arellano after optimization of renal function and discharge  --S/P echocardiogram per Dr. Arellano's request prior to chemo - normal LVEF    2. Acute Kidney Injury     -- noted to have mild to mod L hydronephrosis with echogenic material in the L renal collecting system  --Cr improving s/p ureteral stent and bx  -- Cr is  2.58 today  -- S/P renal sonogram - renal cysts appear complex and may need additional evaluation  --Hydronephrosis has improved  -- f/u     3. Anemia    -- multifactorial, sec to CKD and Malignancy  -- counts adequate. Monitor for now    We will continue to follow patient and coordinate with [facility] [place]    Josr Nunes PA-C  Hematology/Oncology  [facility]   [phone] (cell)  [phone] (office)  [phone] (alt office)  Evenings and weekends please call MD on call or office

## 2021-06-01 NOTE — PROGRESS NOTE ADULT - SUBJECTIVE AND OBJECTIVE BOX
Date of service: 06-01-21 @ 21:13      Patient is a 75y old  Male who presents with a chief complaint of GIOVANNI (01 Jun 2021 14:46)                                                               INTERVAL HPI/OVERNIGHT EVENTS:    REVIEW OF SYSTEMS:     CONSTITUTIONAL: No weakness, fevers or chills  EYES/ENT: No visual changes , no ear ache   NECK: No pain or stiffness  RESPIRATORY: No cough, wheezing,  No shortness of breath  CARDIOVASCULAR: No chest pain or palpitations  GASTROINTESTINAL: No abdominal pain  . No nausea, vomiting, or hematemesis; No diarrhea or constipation. No melena or hematochezia.  GENITOURINARY: No dysuria, frequency or hematuria  NEUROLOGICAL: No numbness or weakness  SKIN: No itching, burning, rashes, or lesions                                                                                                                                                                                                                                                                                 Medications:  MEDICATIONS  (STANDING):  amLODIPine   Tablet 10 milliGRAM(s) Oral daily  dextrose 5%. 1000 milliLiter(s) (100 mL/Hr) IV Continuous <Continuous>  heparin   Injectable 5000 Unit(s) SubCutaneous every 8 hours  hydrALAZINE 10 milliGRAM(s) Oral three times a day  latanoprost 0.005% Ophthalmic Solution 1 Drop(s) Both EYES at bedtime  metoclopramide Injectable 5 milliGRAM(s) IV Push three times a day  polyethylene glycol 3350 17 Gram(s) Oral daily  senna 2 Tablet(s) Oral at bedtime    MEDICATIONS  (PRN):  acetaminophen   Tablet .. 975 milliGRAM(s) Oral every 8 hours PRN Mild Pain (1 - 3), Moderate Pain (4 - 6)  simethicone 80 milliGRAM(s) Chew every 6 hours PRN Gas       Allergies    aspirin (Rash)    Intolerances      Vital Signs Last 24 Hrs  T(C): 37.1 (01 Jun 2021 20:42), Max: 37.1 (01 Jun 2021 20:42)  T(F): 98.8 (01 Jun 2021 20:42), Max: 98.8 (01 Jun 2021 20:42)  HR: 90 (01 Jun 2021 20:42) (81 - 90)  BP: 100/68 (01 Jun 2021 20:42) (100/62 - 146/70)  BP(mean): --  RR: 18 (01 Jun 2021 20:42) (18 - 18)  SpO2: 92% (01 Jun 2021 20:42) (92% - 98%)  CAPILLARY BLOOD GLUCOSE          05-31 @ 07:01  -  06-01 @ 07:00  --------------------------------------------------------  IN: 150 mL / OUT: 600 mL / NET: -450 mL    06-01 @ 07:01  - 06-01 @ 21:13  --------------------------------------------------------  IN: 1000 mL / OUT: 400 mL / NET: 600 mL      Physical Exam:    Daily     Daily   General:  Well appearing, NAD, not cachetic  HEENT:  Nonicteric, PERRLA  CV:  RRR, S1S2   Lungs:  CTA B/L, no wheezes, rales, rhonchi  Abdomen:  Soft, non-tender, no distended, positive BS  Extremities:  2+ pulses, no c/c, no edema  Skin:  Warm and dry, no rashes  :  No thibodeaux  Neuro:  AAOx3, non-focal, grossly intact                                                                                                                                                                                                                                                                                                LABS:                               11.7   6.46  )-----------( 295      ( 01 Jun 2021 06:49 )             35.7                      06-01    150<H>  |  116<H>  |  24<H>  ----------------------------<  77  4.1   |  19<L>  |  2.58<H>    Ca    9.9      01 Jun 2021 06:48                         RADIOLOGY & ADDITIONAL TESTS         I personally reviewed: [  ]EKG   [  ]CXR    [  ] CT      A/P:         Discussed with :     [facility] consultants' Notes   Time spent :

## 2021-06-01 NOTE — PROGRESS NOTE ADULT - SUBJECTIVE AND OBJECTIVE BOX
Patient is a 75y old  Male who presents with a chief complaint of GIOVANNI (31 May 2021 18:36)    Patient seen this morning. Feeling well. Creatinine has begun to improve.    MEDICATIONS  (STANDING):  amLODIPine   Tablet 10 milliGRAM(s) Oral daily  dextrose 5%. 1000 milliLiter(s) (50 mL/Hr) IV Continuous <Continuous>  heparin   Injectable 5000 Unit(s) SubCutaneous every 8 hours  hydrALAZINE 10 milliGRAM(s) Oral three times a day  latanoprost 0.005% Ophthalmic Solution 1 Drop(s) Both EYES at bedtime  metoclopramide Injectable 5 milliGRAM(s) IV Push three times a day  polyethylene glycol 3350 17 Gram(s) Oral daily  senna 2 Tablet(s) Oral at bedtime    MEDICATIONS  (PRN):  acetaminophen   Tablet .. 975 milliGRAM(s) Oral every 8 hours PRN Mild Pain (1 - 3), Moderate Pain (4 - 6)  simethicone 80 milliGRAM(s) Chew every 6 hours PRN Gas        Vital Signs Last 24 Hrs  T(C): 36.9 (01 Jun 2021 04:46), Max: 37 (31 May 2021 12:12)  T(F): 98.4 (01 Jun 2021 04:46), Max: 98.6 (31 May 2021 12:12)  HR: 81 (01 Jun 2021 04:46) (79 - 83)  BP: 146/70 (01 Jun 2021 04:46) (111/73 - 146/70)  BP(mean): --  RR: 18 (01 Jun 2021 04:46) (17 - 18)  SpO2: 98% (01 Jun 2021 04:46) (94% - 98%)    PE  NAD  Awake, alert  Anicteric  No c/c/e  No rash grossly                            11.7   6.46  )-----------( 295      ( 01 Jun 2021 06:49 )             35.7       06-01    150<H>  |  116<H>  |  24<H>  ----------------------------<  77  4.1   |  19<L>  |  2.58<H>    Ca    9.9      01 Jun 2021 06:48      EXAM:  US KIDNEYS AND BLADDER                            PROCEDURE DATE:  05/31/2021            INTERPRETATION:  CLINICAL INFORMATION: Left-sided hydronephrosis status post ureteral stent    COMPARISON: Ultrasound 5/22/2021 and CT abdomen and pelvis 5/24/2020    TECHNIQUE: Sonography of the kidneys and bladder.    FINDINGS:    Right kidney: 10.6 cm. Increased echogenicity. Multiple cysts, some of which contain septations are again seen. For example, there is a 2.7 x 2.2 x 2.4 cm mid pole cyst containing internal septations. Nonobstructing renal calculi, the largest measuring 1.2 cm. No hydronephrosis.    Left kidney: 11.9 cm.Interval resolution of previously seen hydronephrosis. Interval placement of ureteral stent which appears in place. Increased echogenicity. Multiple cysts are again seen. The largest is a mid pole complex peripherally calcified cyst measuring 5.0 x 4.6 x 4.2 cm. Additionally, there is a 1.7 x 1.9 x 2.1 cm lower pole cyst containing internal septations.    Urinary bladder: Ramon catheter.    IMPRESSION:    Interval resolution of previously seen left-sided hydronephrosis with placement of ureteral stent.    Bilateral increased cortical echogenicity, representative of medical renal disease.    Multiple bilateral renal cysts are again seen, some of which are complex as described above. A CT or MR renal protocol is suggested for further evaluation.

## 2021-06-01 NOTE — PROGRESS NOTE ADULT - SUBJECTIVE AND OBJECTIVE BOX
[facility] DIVISION OF KIDNEY DISEASES AND HYPERTENSION -- FOLLOW UP NOTE  --------------------------------------------------------------------------------    24 hour events/subjective: Patient was seen and examined at bedside. Reported feeling well. Endorse some poor appetite. Denies CP, SOB, fever, chills, diarrhea, LE edema or dysuria.    PAST HISTORY  --------------------------------------------------------------------------------  No significant changes to PMH, PSH, FHx, SHx, unless otherwise noted    ALLERGIES & MEDICATIONS  --------------------------------------------------------------------------------  Allergies    aspirin (Rash)    Intolerances    Standing Inpatient Medications  amLODIPine   Tablet 10 milliGRAM(s) Oral daily  dextrose 5%. 1000 milliLiter(s) IV Continuous <Continuous>  heparin   Injectable 5000 Unit(s) SubCutaneous every 8 hours  hydrALAZINE 10 milliGRAM(s) Oral three times a day  latanoprost 0.005% Ophthalmic Solution 1 Drop(s) Both EYES at bedtime  metoclopramide Injectable 5 milliGRAM(s) IV Push three times a day  polyethylene glycol 3350 17 Gram(s) Oral daily  senna 2 Tablet(s) Oral at bedtime    PRN Inpatient Medications  acetaminophen   Tablet .. 975 milliGRAM(s) Oral every 8 hours PRN  simethicone 80 milliGRAM(s) Chew every 6 hours PRN      REVIEW OF SYSTEMS  --------------------------------------------------------------------------------  Gen: No fevers/chills  Respiratory: No dyspnea, cough  CV: No chest pain  GI: No abdominal pain, diarrhea, + poor appetite   : No dysuria, hematuria  MSK: No  edema    All other systems were reviewed and are negative, except as noted.    VITALS/PHYSICAL EXAM  --------------------------------------------------------------------------------  T(C): 36.9 (06-01-21 @ 04:46), Max: 36.9 (06-01-21 @ 04:46)  HR: 81 (06-01-21 @ 04:46) (81 - 83)  BP: 146/70 (06-01-21 @ 04:46) (111/73 - 146/70)  RR: 18 (06-01-21 @ 04:46) (18 - 18)  SpO2: 98% (06-01-21 @ 04:46) (97% - 98%)  Wt(kg): --        05-31-21 @ 07:01  -  06-01-21 @ 07:00  --------------------------------------------------------  IN: 150 mL / OUT: 600 mL / NET: -450 mL    Physical Exam:  	Gen: NAD  	HEENT: MMM  	Pulm: CTA B/L, no crackles   	CV: S1S2  	Abd: Soft, +BS   	Ext: No LE edema B/L  	Neuro: Awake  	Skin: Warm and dry    LABS/STUDIES  --------------------------------------------------------------------------------              11.7   6.46  >-----------<  295      [06-01-21 @ 06:49]              35.7     150  |  116  |  24  ----------------------------<  77      [06-01-21 @ 06:48]  4.1   |  19  |  2.58        Ca     9.9     [06-01-21 @ 06:48]            Creatinine Trend:  SCr 2.58 [06-01 @ 06:48]  SCr 2.81 [05-31 @ 06:53]  SCr 3.36 [05-30 @ 07:30]  SCr 3.27 [05-29 @ 12:21]  SCr 3.68 [05-28 @ 07:00]    Urinalysis - [05-25-21 @ 07:49]      Color Light Yellow / Appearance Clear / SG 1.014 / pH 6.0      Gluc Negative / Ketone Small  / Bili Negative / Urobili Negative       Blood Moderate / Protein Trace / Leuk Est Large / Nitrite Negative      RBC 11 / WBC 7 / Hyaline 0 / Gran  / Sq Epi  / Non Sq Epi 0 / Bacteria Negative        HBsAg Nonreact      [05-25-21 @ 15:14]  HCV 0.84, Grayzone      [05-25-21 @ 15:14]    WILY: titer 1:640, pattern Homogeneous      [05-25-21 @ 15:16]  dsDNA <12      [05-25-21 @ 15:16]  C3 Complement 113      [05-25-21 @ 15:35]  C4 Complement 36      [05-25-21 @ 15:35]  MPO-ANCA <5.0, interpretation: Negative      [05-26-21 @ 08:40]  PR3-ANCA 5.1, interpretation: Negative      [05-26-21 @ 08:40]  anti-GBM 3      [05-25-21 @ 17:51]  Free Light Chains: kappa 4.90, lambda 2.72, ratio = 1.80      [05-26 @ 08:40]  Immunofixation Serum:   No Monoclonal Band Identified    Reference Range: None Detected      [05-26-21 @ 08:40]  SPEP Interpretation: Normal Electrophoresis Pattern      [05-26-21 @ 08:40]

## 2021-06-01 NOTE — PROGRESS NOTE ADULT - ASSESSMENT
75 yr old male w/ recent diagnosis of stage IV gastric carcinoma sent from [facility] with noted outpt abnormal labs concerning for new onset acute renal failure.     Problem/Plan - 1:  ·  Problem: Acute kidney failure.  Plan: may have component of dehydration/pre renal but unclear cause of rapid decline; pt denies any new medications  - Renal US:< from: US Kidney and Bladder (05.22.21 @ 17:34) >  Increased renal cortical echogenicity bilaterally which may be secondary to medical renal disease.  Multiple bilateral renal cysts, some of which are complex. In particular, a cyst in the left kidney demonstrates peripheral calcification and internal echogenic material of uncertain etiology.  Mild to moderate left hydronephrosis with echogenic material noted within the left renal collecting system. Nonobstructing right intrarenal calculi.  - avoid nephrotoxic agents  - Cr now slwoly improving .. no obvious etiology .. eosinophils positive.  - reviewed CT and lasix scan findings with urology Dr. Carrillo : pt s/p OR 5/28/21 for cystoscopy/ureteroscopy with stenting   - cont current management, Cr slowly improving   - Hypernatremia - encourage oral in take. IVF per nephrology        Problem/Plan - 2:  ·  Problem: Gastric cancer.  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   nausea -  antiemetics as ordered.     Problem/Plan - 3:  ·  Problem: HTN (hypertension).  Plan:   BP uncontrolled.   increase Norvasc to 10 mg   add Hydralazine 10 mg TID  (home ARB on hold due to Cr)     Problem/Plan - 4:  ·  Problem: back pain : sec to ruptured ? cyst   improved   no neuro deficits

## 2021-06-01 NOTE — PROGRESS NOTE ADULT - ASSESSMENT
75y M with gastric cancer presenting with acute renal failure    #GIOVANNI  - Pt reportedly had normal renal fxn several weeks ago. Now presenting with Cr of 4mg/dl in setting of poor Po intake and ? ARB use. Possibly now with ATN vs. obstructive disease  - UA relatively bland, no significant proteinuria  - Cr improving to 2.58 mg/dl today   - Renal US findings noted, patient with multiple renal cysts b/l and likely underlying cystic disease but he does not have PCKD as his renal failure is acute, his Cr was .66mg/dl back on 5/14 and acutely eliot to 3.97mg/dl on 5/17 which would not be consistent with a hx of PCKD.  - Left renal collecting system with moderate hydronephrosis with echogenic material despite thibodeaux, appreciate urology recs, he is s/p L ureteral stent and biopsy.   - No renal bx at this point  - Dose meds as per GFR     #Hypernatremia  - Na 150 today. Suggest to start D5W at 100 cc per hour  - Encourage PO fluids intake     #Metabolic acidosis  - NAGMA, 2/2 GIOVANNI  - Monitor bicarb for now, no indication for bicarb therapy at this time    If any questions, please feel free to contact me     Jacob Browning  Nephrology Fellow  [facility] Pager: [phone]  [facility] Pager: [phone]

## 2021-06-01 NOTE — CHART NOTE - NSCHARTNOTEFT_GEN_A_CORE
Nutrition Follow Up Note  Patient seen for: malnutrition follow up.     Chart reviewed, events noted. Pt is a 75 yr old male w/ recent diagnosis of stage IV gastric carcinoma sent from [facility] with noted outpt abnormal labs concerning for new onset acute renal failure.     Source: [x] Patient       [x] EMR        [] RN        [] Family at bedside       [] Other:    -If unable to interview patient: [] Trach/Vent/BiPAP  [] Disoriented/confused/inappropriate to interview    Diet Order:   Diet, Regular (05-28-21)    - Is current order appropriate/adequate? [x] Yes  []  No:     - PO intake :   [] >75%  Adequate    [] 50-75%  Fair       [x] <50%  Poor    - Nutrition-related concerns:  - Per flow sheet documentation, pt with poor intake of meal trays noted (<50%).  - Pt reports ongoing poor appetite.  - Offered to obtain food preferences but pt with none at this time. Offered oral nutrition supplements (Mighty Shakes, Ensure) however pt declining at this time. Pt reminded of importance of adequate protein and calorie intake.    GI:  Last BM 5/30.   Bowel Regimen? [x] Yes   [] No    Weights:   no updated weights available at this time, will continue to monitor weights for changes if any     Nutritionally Pertinent MEDICATIONS  (STANDING):  amLODIPine   Tablet  dextrose 5%.  hydrALAZINE  polyethylene glycol 3350  senna    Pertinent Labs: 06-01 @ 06:48: Na 150<H>, BUN 24<H>, Cr 2.58<H>, BG 77, K+ 4.1, Phos --, Mg --, Alk Phos --, ALT/SGPT --, AST/SGOT --, HbA1c --    Finger Sticks: n/a    Skin per nursing documentation: no pressure injuries   Edema: +1 pacheco. leg    Estimated Needs:   [x] no change since previous assessment  [] recalculated:     Previous Nutrition Diagnosis: Moderate Chronic Malnutrition   Nutrition Diagnosis is: [x] ongoing  [] resolved [] not applicable     New Nutrition Diagnosis: [x] Not applicable    Nutrition Care Plan:  [x] In Progress - being addressed with PO encouragement  [] Achieved  [] Not applicable    Nutrition Interventions:     Education Provided:       [x] Yes: Provided recommendations to optimize PO and protein intake, recommended small frequent meals by ordering nutrient-dense snacks and leaving non-perishable food away from tray for later consumption during the day or between meals, to start with protein.       Recommendations:      1. Continue regular diet.  2. Continue to monitor for acceptance of oral nutrition supplements. RD to obtain and honor food preferences as able. Provide encouragement with PO intake, menu selections, and assistance with meals as needed.   3. Consider daily Nephro-milena.     Monitoring and Evaluation:   Continue to monitor nutritional intake, tolerance to diet prescription, weights, labs, skin integrity    RD remains available upon request and will follow up per protocol    Manda Perkins MS, RD, CDN Pager# [phone] Nutrition Follow Up Note  Patient seen for: malnutrition follow up.     Chart reviewed, events noted. Pt is a 75 yr old male w/ recent diagnosis of stage IV gastric carcinoma sent from [facility] [place] with noted outpt abnormal labs concerning for new onset acute renal failure.     Source: [x] Patient       [x] EMR        [] RN        [] Family at bedside       [] Other:    -If unable to interview patient: [] Trach/Vent/BiPAP  [] Disoriented/confused/inappropriate to interview    Diet Order:   Diet, Regular (05-28-21)    - Is current order appropriate/adequate? [x] Yes  []  No:     - PO intake :   [] >75%  Adequate    [] 50-75%  Fair       [x] <50%  Poor    - Nutrition-related concerns:  - Per flow sheet documentation, pt with poor intake of meal trays noted (<50%).  - Pt reports ongoing poor appetite.  - Offered to obtain food preferences but pt with none at this time. Offered oral nutrition supplements (Mighty Shakes, Ensure) however pt declining at this time. Pt reminded of importance of adequate protein and calorie intake.    GI:  Last BM 5/30.   Bowel Regimen? [x] Yes   [] No    Weights: Dosing wt 69.9kg  no updated weights available at this time, will continue to monitor weights for changes if any     Nutritionally Pertinent MEDICATIONS  (STANDING):  amLODIPine   Tablet  dextrose 5%.  hydrALAZINE  polyethylene glycol 3350  senna    Pertinent Labs: 06-01 @ 06:48: Na 150<H>, BUN 24<H>, Cr 2.58<H>, BG 77, K+ 4.1, Phos --, Mg --, Alk Phos --, ALT/SGPT --, AST/SGOT --, HbA1c --    Finger Sticks: n/a    Skin per nursing documentation: no pressure injuries   Edema: +1 pacheco. leg    Estimated Needs:   [x] no change since previous assessment  [] recalculated:     Previous Nutrition Diagnosis: Moderate Chronic Malnutrition   Nutrition Diagnosis is: [x] ongoing  [] resolved [] not applicable     New Nutrition Diagnosis: [x] Not applicable    Nutrition Care Plan:  [x] In Progress - being addressed with PO encouragement  [] Achieved  [] Not applicable    Nutrition Interventions:     Education Provided:       [x] Yes: Provided recommendations to optimize PO and protein intake, recommended small frequent meals by ordering nutrient-dense snacks and leaving non-perishable food away from tray for later consumption during the day or between meals, to start with protein.       Recommendations:      1. Continue regular diet.  2. Continue to monitor for acceptance of oral nutrition supplements. RD to obtain and honor food preferences as able. Provide encouragement with PO intake, menu selections, and assistance with meals as needed.   3. Consider daily Nephro-milena.   4. If persistent poor PO continues consider appetite stimulant if medically feasible.    Monitoring and Evaluation:   Continue to monitor nutritional intake, tolerance to diet prescription, weights, labs, skin integrity    RD remains available upon request and will follow up per protocol    Manda Perkins MS, RD, CDN Pager# [phone]

## 2021-06-02 LAB
ANION GAP SERPL CALC-SCNC: 14 MMOL/L — SIGNIFICANT CHANGE UP (ref 5–17)
BUN SERPL-MCNC: 22 MG/DL — SIGNIFICANT CHANGE UP (ref 7–23)
CALCIUM SERPL-MCNC: 9.3 MG/DL — SIGNIFICANT CHANGE UP (ref 8.4–10.5)
CHLORIDE SERPL-SCNC: 112 MMOL/L — HIGH (ref 96–108)
CO2 SERPL-SCNC: 19 MMOL/L — LOW (ref 22–31)
CREAT SERPL-MCNC: 2.55 MG/DL — HIGH (ref 0.5–1.3)
GLUCOSE SERPL-MCNC: 116 MG/DL — HIGH (ref 70–99)
HCT VFR BLD CALC: 33.1 % — LOW (ref 39–50)
HGB BLD-MCNC: 10.8 G/DL — LOW (ref 13–17)
MCHC RBC-ENTMCNC: 28.1 PG — SIGNIFICANT CHANGE UP (ref 27–34)
MCHC RBC-ENTMCNC: 32.6 GM/DL — SIGNIFICANT CHANGE UP (ref 32–36)
MCV RBC AUTO: 86.2 FL — SIGNIFICANT CHANGE UP (ref 80–100)
NRBC # BLD: 0 /100 WBCS — SIGNIFICANT CHANGE UP (ref 0–0)
PLATELET # BLD AUTO: 287 K/UL — SIGNIFICANT CHANGE UP (ref 150–400)
POTASSIUM SERPL-MCNC: 3.9 MMOL/L — SIGNIFICANT CHANGE UP (ref 3.5–5.3)
POTASSIUM SERPL-SCNC: 3.9 MMOL/L — SIGNIFICANT CHANGE UP (ref 3.5–5.3)
RBC # BLD: 3.84 M/UL — LOW (ref 4.2–5.8)
RBC # FLD: 15.7 % — HIGH (ref 10.3–14.5)
SODIUM SERPL-SCNC: 145 MMOL/L — SIGNIFICANT CHANGE UP (ref 135–145)
WBC # BLD: 6.72 K/UL — SIGNIFICANT CHANGE UP (ref 3.8–10.5)
WBC # FLD AUTO: 6.72 K/UL — SIGNIFICANT CHANGE UP (ref 3.8–10.5)

## 2021-06-02 RX ADMIN — Medication 5 MILLIGRAM(S): at 14:04

## 2021-06-02 RX ADMIN — Medication 10 MILLIGRAM(S): at 14:04

## 2021-06-02 RX ADMIN — Medication 10 MILLIGRAM(S): at 21:49

## 2021-06-02 RX ADMIN — Medication 5 MILLIGRAM(S): at 05:18

## 2021-06-02 RX ADMIN — Medication 5 MILLIGRAM(S): at 21:50

## 2021-06-02 RX ADMIN — HEPARIN SODIUM 5000 UNIT(S): 5000 INJECTION INTRAVENOUS; SUBCUTANEOUS at 05:18

## 2021-06-02 RX ADMIN — LATANOPROST 1 DROP(S): 0.05 SOLUTION/ DROPS OPHTHALMIC; TOPICAL at 21:49

## 2021-06-02 RX ADMIN — AMLODIPINE BESYLATE 10 MILLIGRAM(S): 2.5 TABLET ORAL at 05:18

## 2021-06-02 RX ADMIN — HEPARIN SODIUM 5000 UNIT(S): 5000 INJECTION INTRAVENOUS; SUBCUTANEOUS at 14:04

## 2021-06-02 RX ADMIN — HEPARIN SODIUM 5000 UNIT(S): 5000 INJECTION INTRAVENOUS; SUBCUTANEOUS at 21:50

## 2021-06-02 RX ADMIN — Medication 10 MILLIGRAM(S): at 05:18

## 2021-06-02 NOTE — PROGRESS NOTE ADULT - ASSESSMENT
1. Stage IV Gastric Cancer    -- Follows Dr. Kendall Arellano at [facility] [place]  --Treatment per Dr. Arellano after optimization of renal function and discharge  --S/P echocardiogram per Dr. Arellnao's request prior to chemo - normal LVEF    2. Acute Kidney Injury     -- noted to have mild to mod L hydronephrosis with echogenic material in the L renal collecting system  --Cr improving s/p ureteral stent and bx  -- Cr is  2.55 today  -- S/P renal sonogram - renal cysts appear complex and may need additional evaluation  --Hydronephrosis has improved  -- f/u     3. Anemia    -- multifactorial, sec to CKD and Malignancy  -- counts adequate. Monitor for now    We will continue to follow patient and coordinate with [facility] [place]    Josr Nunes PA-C  Hematology/Oncology  [facility]   [phone] (cell)  [phone] (office)  [phone] (alt office)  Evenings and weekends please call MD on call or office

## 2021-06-02 NOTE — PROGRESS NOTE ADULT - SUBJECTIVE AND OBJECTIVE BOX
Patient is a 75y old  Male who presents with a chief complaint of GIOVANNI (01 Jun 2021 21:13)    Patient seen this morning. no new complaints. Feeling well.    MEDICATIONS  (STANDING):  amLODIPine   Tablet 10 milliGRAM(s) Oral daily  dextrose 5%. 1000 milliLiter(s) (100 mL/Hr) IV Continuous <Continuous>  heparin   Injectable 5000 Unit(s) SubCutaneous every 8 hours  hydrALAZINE 10 milliGRAM(s) Oral three times a day  latanoprost 0.005% Ophthalmic Solution 1 Drop(s) Both EYES at bedtime  metoclopramide Injectable 5 milliGRAM(s) IV Push three times a day  polyethylene glycol 3350 17 Gram(s) Oral daily  senna 2 Tablet(s) Oral at bedtime    MEDICATIONS  (PRN):  acetaminophen   Tablet .. 975 milliGRAM(s) Oral every 8 hours PRN Mild Pain (1 - 3), Moderate Pain (4 - 6)  simethicone 80 milliGRAM(s) Chew every 6 hours PRN Gas        Vital Signs Last 24 Hrs  T(C): 36.8 (02 Jun 2021 04:48), Max: 37.1 (01 Jun 2021 20:42)  T(F): 98.3 (02 Jun 2021 04:48), Max: 98.8 (01 Jun 2021 20:42)  HR: 75 (02 Jun 2021 04:48) (75 - 90)  BP: 131/74 (02 Jun 2021 04:48) (100/62 - 131/74)  BP(mean): --  RR: 18 (02 Jun 2021 04:48) (18 - 18)  SpO2: 97% (02 Jun 2021 04:48) (92% - 98%)    PE  NAD  Awake, alert  Anicteric  No rash grossly                            10.8   6.72  )-----------( 287      ( 02 Jun 2021 07:10 )             33.1       06-02    145  |  112<H>  |  22  ----------------------------<  116<H>  3.9   |  19<L>  |  2.55<H>    Ca    9.3      02 Jun 2021 07:07

## 2021-06-02 NOTE — PROGRESS NOTE ADULT - SUBJECTIVE AND OBJECTIVE BOX
Date of service: 06-02-21 @ 23:45      Patient is a 75y old  Male who presents with a chief complaint of GIOVANNI (02 Jun 2021 11:12)                                                               INTERVAL HPI/OVERNIGHT EVENTS:    REVIEW OF SYSTEMS:     CONSTITUTIONAL: No weakness, fevers or chills  EYES/ENT: No visual changes , no ear ache   NECK: No pain or stiffness  RESPIRATORY: No cough, wheezing,  No shortness of breath  CARDIOVASCULAR: No chest pain or palpitations  GASTROINTESTINAL: No abdominal pain  . No nausea, vomiting, or hematemesis; No diarrhea or constipation. No melena or hematochezia.  GENITOURINARY: No dysuria, frequency or hematuria  NEUROLOGICAL: No numbness or weakness  SKIN: No itching, burning, rashes, or lesions                                                                                                                                                                                                                                                                                 Medications:  MEDICATIONS  (STANDING):  amLODIPine   Tablet 10 milliGRAM(s) Oral daily  dextrose 5%. 1000 milliLiter(s) (100 mL/Hr) IV Continuous <Continuous>  heparin   Injectable 5000 Unit(s) SubCutaneous every 8 hours  hydrALAZINE 10 milliGRAM(s) Oral three times a day  latanoprost 0.005% Ophthalmic Solution 1 Drop(s) Both EYES at bedtime  metoclopramide Injectable 5 milliGRAM(s) IV Push three times a day  polyethylene glycol 3350 17 Gram(s) Oral daily  senna 2 Tablet(s) Oral at bedtime    MEDICATIONS  (PRN):  acetaminophen   Tablet .. 975 milliGRAM(s) Oral every 8 hours PRN Mild Pain (1 - 3), Moderate Pain (4 - 6)  simethicone 80 milliGRAM(s) Chew every 6 hours PRN Gas       Allergies    aspirin (Rash)    Intolerances      Vital Signs Last 24 Hrs  T(C): 36.9 (02 Jun 2021 20:31), Max: 36.9 (02 Jun 2021 20:31)  T(F): 98.4 (02 Jun 2021 20:31), Max: 98.4 (02 Jun 2021 20:31)  HR: 74 (02 Jun 2021 20:31) (70 - 75)  BP: 132/73 (02 Jun 2021 20:31) (131/74 - 136/72)  BP(mean): --  RR: 18 (02 Jun 2021 20:31) (18 - 18)  SpO2: 99% (02 Jun 2021 20:31) (97% - 99%)  CAPILLARY BLOOD GLUCOSE          06-01 @ 07:01  -  06-02 @ 07:00  --------------------------------------------------------  IN: 1000 mL / OUT: 1200 mL / NET: -200 mL    06-02 @ 07:01  -  06-02 @ 23:45  --------------------------------------------------------  IN: 0 mL / OUT: 400 mL / NET: -400 mL      Physical Exam:    Daily     Daily   General:  Well appearing, NAD, not cachetic  HEENT:  Nonicteric, PERRLA  CV:  RRR, S1S2   Lungs:  CTA B/L, no wheezes, rales, rhonchi  Abdomen:  Soft, non-tender, no distended, positive BS  Extremities:  2+ pulses, no c/c, no edema  Skin:  Warm and dry, no rashes  :  No thibodeaux  Neuro:  AAOx3, non-focal, grossly intact                                                                                                                                                                                                                                                                                                LABS:                               10.8   6.72  )-----------( 287      ( 02 Jun 2021 07:10 )             33.1                      06-02    145  |  112<H>  |  22  ----------------------------<  116<H>  3.9   |  19<L>  |  2.55<H>    Ca    9.3      02 Jun 2021 07:07                         RADIOLOGY & ADDITIONAL TESTS         I personally reviewed: [  ]EKG   [  ]CXR    [  ] CT      A/P:         Discussed with :     [facility] consultants' Notes   Time spent :

## 2021-06-03 LAB
ANION GAP SERPL CALC-SCNC: 12 MMOL/L — SIGNIFICANT CHANGE UP (ref 5–17)
BUN SERPL-MCNC: 22 MG/DL — SIGNIFICANT CHANGE UP (ref 7–23)
CALCIUM SERPL-MCNC: 9.6 MG/DL — SIGNIFICANT CHANGE UP (ref 8.4–10.5)
CHLORIDE SERPL-SCNC: 109 MMOL/L — HIGH (ref 96–108)
CO2 SERPL-SCNC: 20 MMOL/L — LOW (ref 22–31)
CREAT SERPL-MCNC: 2.54 MG/DL — HIGH (ref 0.5–1.3)
GLUCOSE SERPL-MCNC: 100 MG/DL — HIGH (ref 70–99)
HCT VFR BLD CALC: 37.1 % — LOW (ref 39–50)
HGB BLD-MCNC: 12.2 G/DL — LOW (ref 13–17)
MCHC RBC-ENTMCNC: 28 PG — SIGNIFICANT CHANGE UP (ref 27–34)
MCHC RBC-ENTMCNC: 32.9 GM/DL — SIGNIFICANT CHANGE UP (ref 32–36)
MCV RBC AUTO: 85.1 FL — SIGNIFICANT CHANGE UP (ref 80–100)
NRBC # BLD: 0 /100 WBCS — SIGNIFICANT CHANGE UP (ref 0–0)
PLATELET # BLD AUTO: 307 K/UL — SIGNIFICANT CHANGE UP (ref 150–400)
POTASSIUM SERPL-MCNC: 4.1 MMOL/L — SIGNIFICANT CHANGE UP (ref 3.5–5.3)
POTASSIUM SERPL-SCNC: 4.1 MMOL/L — SIGNIFICANT CHANGE UP (ref 3.5–5.3)
RBC # BLD: 4.36 M/UL — SIGNIFICANT CHANGE UP (ref 4.2–5.8)
RBC # FLD: 15.5 % — HIGH (ref 10.3–14.5)
SODIUM SERPL-SCNC: 141 MMOL/L — SIGNIFICANT CHANGE UP (ref 135–145)
SURGICAL PATHOLOGY STUDY: SIGNIFICANT CHANGE UP
WBC # BLD: 6.29 K/UL — SIGNIFICANT CHANGE UP (ref 3.8–10.5)
WBC # FLD AUTO: 6.29 K/UL — SIGNIFICANT CHANGE UP (ref 3.8–10.5)

## 2021-06-03 PROCEDURE — 99232 SBSQ HOSP IP/OBS MODERATE 35: CPT

## 2021-06-03 RX ORDER — SODIUM CHLORIDE 9 MG/ML
1000 INJECTION, SOLUTION INTRAVENOUS
Refills: 0 | Status: DISCONTINUED | OUTPATIENT
Start: 2021-06-03 | End: 2021-06-05

## 2021-06-03 RX ADMIN — HEPARIN SODIUM 5000 UNIT(S): 5000 INJECTION INTRAVENOUS; SUBCUTANEOUS at 13:25

## 2021-06-03 RX ADMIN — Medication 5 MILLIGRAM(S): at 13:25

## 2021-06-03 RX ADMIN — AMLODIPINE BESYLATE 10 MILLIGRAM(S): 2.5 TABLET ORAL at 05:25

## 2021-06-03 RX ADMIN — LATANOPROST 1 DROP(S): 0.05 SOLUTION/ DROPS OPHTHALMIC; TOPICAL at 21:34

## 2021-06-03 RX ADMIN — Medication 10 MILLIGRAM(S): at 13:25

## 2021-06-03 RX ADMIN — HEPARIN SODIUM 5000 UNIT(S): 5000 INJECTION INTRAVENOUS; SUBCUTANEOUS at 21:33

## 2021-06-03 RX ADMIN — SODIUM CHLORIDE 60 MILLILITER(S): 9 INJECTION, SOLUTION INTRAVENOUS at 21:34

## 2021-06-03 RX ADMIN — HEPARIN SODIUM 5000 UNIT(S): 5000 INJECTION INTRAVENOUS; SUBCUTANEOUS at 05:26

## 2021-06-03 RX ADMIN — SODIUM CHLORIDE 60 MILLILITER(S): 9 INJECTION, SOLUTION INTRAVENOUS at 16:00

## 2021-06-03 RX ADMIN — Medication 10 MILLIGRAM(S): at 21:33

## 2021-06-03 RX ADMIN — Medication 5 MILLIGRAM(S): at 21:34

## 2021-06-03 RX ADMIN — Medication 10 MILLIGRAM(S): at 05:26

## 2021-06-03 RX ADMIN — Medication 5 MILLIGRAM(S): at 05:26

## 2021-06-03 RX ADMIN — SENNA PLUS 2 TABLET(S): 8.6 TABLET ORAL at 21:33

## 2021-06-03 RX ADMIN — POLYETHYLENE GLYCOL 3350 17 GRAM(S): 17 POWDER, FOR SOLUTION ORAL at 11:34

## 2021-06-03 NOTE — PROGRESS NOTE ADULT - SUBJECTIVE AND OBJECTIVE BOX
Patient is a 75y old  Male who presents with a chief complaint of GIOVANNI (03 Jun 2021 10:41)    Patient seen this morning. No new complaints.    MEDICATIONS  (STANDING):  amLODIPine   Tablet 10 milliGRAM(s) Oral daily  dextrose 5%. 1000 milliLiter(s) (100 mL/Hr) IV Continuous <Continuous>  heparin   Injectable 5000 Unit(s) SubCutaneous every 8 hours  hydrALAZINE 10 milliGRAM(s) Oral three times a day  latanoprost 0.005% Ophthalmic Solution 1 Drop(s) Both EYES at bedtime  metoclopramide Injectable 5 milliGRAM(s) IV Push three times a day  polyethylene glycol 3350 17 Gram(s) Oral daily  senna 2 Tablet(s) Oral at bedtime    MEDICATIONS  (PRN):  acetaminophen   Tablet .. 975 milliGRAM(s) Oral every 8 hours PRN Mild Pain (1 - 3), Moderate Pain (4 - 6)  simethicone 80 milliGRAM(s) Chew every 6 hours PRN Gas        Vital Signs Last 24 Hrs  T(C): 36.8 (03 Jun 2021 12:00), Max: 36.9 (02 Jun 2021 20:31)  T(F): 98.2 (03 Jun 2021 12:00), Max: 98.4 (02 Jun 2021 20:31)  HR: 74 (03 Jun 2021 12:00) (70 - 74)  BP: 138/77 (03 Jun 2021 12:00) (132/73 - 153/76)  BP(mean): --  RR: 18 (03 Jun 2021 12:00) (18 - 18)  SpO2: 96% (03 Jun 2021 12:00) (96% - 99%)    PE  NAD  Awake, alert  Anicteric  No rash grossly                            12.2   6.29  )-----------( 307      ( 03 Jun 2021 07:05 )             37.1       06-03    141  |  109<H>  |  22  ----------------------------<  100<H>  4.1   |  20<L>  |  2.54<H>    Ca    9.6      03 Jun 2021 07:01        ACCESSION No:  10 [id number]    CHAO MINER                       2        Surgical Final Report          Final Diagnosis  Ureter, left, biopsy  -Histologic Type:  Invasive urothelial carcinoma  -Histologic Grade:  High grade  -Pattern of growth of the non-invasive component:  Possibly  papillary  -Local Invasion:  Carcinoma invades lamina propria  -Muscularis Propria:  Muscularis propria is not identified  -Lymphovascular Invasion:  Not identified

## 2021-06-03 NOTE — PROGRESS NOTE ADULT - SUBJECTIVE AND OBJECTIVE BOX
Date of service: 06-03-21 @ 22:17      Patient is a 75y old  Male who presents with a chief complaint of GIOVANNI (03 Jun 2021 12:04)                                                               INTERVAL HPI/OVERNIGHT EVENTS:    REVIEW OF SYSTEMS:     CONSTITUTIONAL: No weakness, fevers or chills  EYES/ENT: No visual changes , no ear ache   NECK: No pain or stiffness  RESPIRATORY: No cough, wheezing,  No shortness of breath  CARDIOVASCULAR: No chest pain or palpitations  GASTROINTESTINAL: No abdominal pain  . No nausea, vomiting, or hematemesis; No diarrhea or constipation. No melena or hematochezia.  GENITOURINARY: No dysuria, frequency or hematuria  NEUROLOGICAL: No numbness or weakness  SKIN: No itching, burning, rashes, or lesions                                                                                                                                                                                                                                                                                 Medications:  MEDICATIONS  (STANDING):  amLODIPine   Tablet 10 milliGRAM(s) Oral daily  heparin   Injectable 5000 Unit(s) SubCutaneous every 8 hours  hydrALAZINE 10 milliGRAM(s) Oral three times a day  latanoprost 0.005% Ophthalmic Solution 1 Drop(s) Both EYES at bedtime  metoclopramide Injectable 5 milliGRAM(s) IV Push three times a day  polyethylene glycol 3350 17 Gram(s) Oral daily  senna 2 Tablet(s) Oral at bedtime  sodium chloride 0.45%. 1000 milliLiter(s) (60 mL/Hr) IV Continuous <Continuous>    MEDICATIONS  (PRN):  acetaminophen   Tablet .. 975 milliGRAM(s) Oral every 8 hours PRN Mild Pain (1 - 3), Moderate Pain (4 - 6)  simethicone 80 milliGRAM(s) Chew every 6 hours PRN Gas       Allergies    aspirin (Rash)    Intolerances      Vital Signs Last 24 Hrs  T(C): 36.8 (03 Jun 2021 20:05), Max: 36.8 (03 Jun 2021 12:00)  T(F): 98.2 (03 Jun 2021 20:05), Max: 98.2 (03 Jun 2021 12:00)  HR: 76 (03 Jun 2021 21:18) (74 - 80)  BP: 149/74 (03 Jun 2021 21:18) (132/72 - 153/76)  BP(mean): --  RR: 18 (03 Jun 2021 20:05) (18 - 18)  SpO2: 97% (03 Jun 2021 21:18) (96% - 97%)  CAPILLARY BLOOD GLUCOSE          06-02 @ 07:01  -  06-03 @ 07:00  --------------------------------------------------------  IN: 0 mL / OUT: 1100 mL / NET: -1100 mL    06-03 @ 07:01  -  06-03 @ 22:17  --------------------------------------------------------  IN: 240 mL / OUT: 400 mL / NET: -160 mL      Physical Exam:    Daily     Daily   General:  Well appearing, NAD, not cachetic  HEENT:  Nonicteric, PERRLA  CV:  RRR, S1S2   Lungs:  CTA B/L, no wheezes, rales, rhonchi  Abdomen:  Soft, non-tender, no distended, positive BS  Extremities:  2+ pulses, no c/c, no edema  Skin:  Warm and dry, no rashes  :  No thibodeaux  Neuro:  AAOx3, non-focal, grossly intact                                                                                                                                                                                                                                                                                                LABS:                               12.2   6.29  )-----------( 307      ( 03 Jun 2021 07:05 )             37.1                      06-03    141  |  109<H>  |  22  ----------------------------<  100<H>  4.1   |  20<L>  |  2.54<H>    Ca    9.6      03 Jun 2021 07:01                         RADIOLOGY & ADDITIONAL TESTS         I personally reviewed: [  ]EKG   [  ]CXR    [  ] CT      A/P:         Discussed with :     [facility] consultants' Notes   Time spent :   Date of service: 06-03-21 @ 22:17      Patient is a 75y old  Male who presents with a chief complaint of GIOVANNI (03 Jun 2021 12:04)                                                               INTERVAL HPI/OVERNIGHT EVENTS:    REVIEW OF SYSTEMS:     CONSTITUTIONAL: No weakness, fevers or chills  RESPIRATORY: No cough, wheezing,  No shortness of breath  CARDIOVASCULAR: No chest pain or palpitations  GASTROINTESTINAL: No abdominal pain  . No nausea, vomiting, or hematemesis; No diarrhea or constipation. No melena or hematochezia.  GENITOURINARY: No dysuria, frequency or hematuria  NEUROLOGICAL: No numbness or weakness  SKIN: No itching, burning, rashes, or lesions                                                                                                                                                                                                                                                                                 Medications:  MEDICATIONS  (STANDING):  amLODIPine   Tablet 10 milliGRAM(s) Oral daily  heparin   Injectable 5000 Unit(s) SubCutaneous every 8 hours  hydrALAZINE 10 milliGRAM(s) Oral three times a day  latanoprost 0.005% Ophthalmic Solution 1 Drop(s) Both EYES at bedtime  metoclopramide Injectable 5 milliGRAM(s) IV Push three times a day  polyethylene glycol 3350 17 Gram(s) Oral daily  senna 2 Tablet(s) Oral at bedtime  sodium chloride 0.45%. 1000 milliLiter(s) (60 mL/Hr) IV Continuous <Continuous>    MEDICATIONS  (PRN):  acetaminophen   Tablet .. 975 milliGRAM(s) Oral every 8 hours PRN Mild Pain (1 - 3), Moderate Pain (4 - 6)  simethicone 80 milliGRAM(s) Chew every 6 hours PRN Gas       Allergies    aspirin (Rash)    Intolerances      Vital Signs Last 24 Hrs  T(C): 36.8 (03 Jun 2021 20:05), Max: 36.8 (03 Jun 2021 12:00)  T(F): 98.2 (03 Jun 2021 20:05), Max: 98.2 (03 Jun 2021 12:00)  HR: 76 (03 Jun 2021 21:18) (74 - 80)  BP: 149/74 (03 Jun 2021 21:18) (132/72 - 153/76)  BP(mean): --  RR: 18 (03 Jun 2021 20:05) (18 - 18)  SpO2: 97% (03 Jun 2021 21:18) (96% - 97%)  CAPILLARY BLOOD GLUCOSE          06-02 @ 07:01  -  06-03 @ 07:00  --------------------------------------------------------  IN: 0 mL / OUT: 1100 mL / NET: -1100 mL    06-03 @ 07:01  -  06-03 @ 22:17  --------------------------------------------------------  IN: 240 mL / OUT: 400 mL / NET: -160 mL      Physical Exam:    Daily     Daily   General:  Well appearing, NAD, not cachetic  HEENT:  Nonicteric, PERRLA  CV:  RRR, S1S2   Lungs:  CTA B/L, no wheezes, rales, rhonchi  Abdomen:  Soft, non-tender, no distended, positive BS  Extremities:  2+ pulses, no c/c, no edema  Skin:  Warm and dry, no rashes  :  No thibodeaux  Neuro:  AAOx3, non-focal, grossly intact                                                                                                                                                                                                                                                                                                LABS:                               12.2   6.29  )-----------( 307      ( 03 Jun 2021 07:05 )             37.1                      06-03    141  |  109<H>  |  22  ----------------------------<  100<H>  4.1   |  20<L>  |  2.54<H>    Ca    9.6      03 Jun 2021 07:01

## 2021-06-03 NOTE — PROGRESS NOTE ADULT - ATTENDING COMMENTS
+ bloating  1.  ARF--modest improvement, non oliguric, no HD required  2.  Obstructive uropathy--appreciate urology input to ascertain site and extent of effect on 1.    3.  Hypernatremia--free water supplement    discussed with med team
+ nausea  1.  ARF--baseline normal function now slowly reolving with volume optimization.  NON oliguric, non uremic and no HD required  2.  Hypernatremia--resolving, free water availability    discussed with med team
Feeling weak.  Discussed at length with wife, sister in attendance  1.  ARF--modest decrement but unilateral obstruction would not account for degree of dysfunction.  Agree with serologic w/u;  potential renal bx if 2 w/u unrevealing  2.  Urinary obstruction--renal scan with lasix washout to assess bilateral function and extent of obstruction  3.  HYpernatremia--free water optimization      discussed with med team, attending
GIOVANNI/ATN and hydro, peak SCr 4.1, now ~2.5    No edema    - Outpatient nephrology follow up  - Liberalize diet, can have potassium containing foods  - Dose meds for GFR mid-20s  - Remainder per fellow, will follow
Hypernatremia  GIOVANNI/ATN and hydro, peak SCr 4.1, now 2.6  Metabolic acidosis    No edema    D5 for elevated Na    Remainder per fellow, will follow
I have seen this patient with the fellow and agree with their assessment and plan. I was physically present for significant portions of the evaluation and management (E/M) service provided.  I agree with the above history, physical, and plan which I have reviewed and edited where appropriate.      Zan Noel MD  Cell   Pager   Office 
I have seen this patient with the fellow and agree with their assessment and plan. I was physically present for significant portions of the evaluation and management (E/M) service provided.  I agree with the above history, physical, and plan which I have reviewed and edited where appropriate.    - Cr stable at 3.6-3.9mg/dl and now s/pt urological stent placement with more UO  - Agree with IVF, c/w LR 50cc/hr  - cont monitor renal function post stent, if no improvement next 48 hours, needs renal bx    Zan Noel MD  Cell   Pager   Office 
Nausea  1.  ARF --non oliguric, volume optimization and rx 2.  NO HD required at this time  2.  Obstructive uropathy--appreciate urology input.  Would fully address before moving to renal biopsy    Discussed with med team
I have seen this patient with the fellow and agree with their assessment and plan. I was physically present for significant portions of the evaluation and management (E/M) service provided.  I agree with the above history, physical, and plan which I have reviewed and edited where appropriate.    Zan Noel MD  Cell   Pager   Office

## 2021-06-03 NOTE — PROGRESS NOTE ADULT - ASSESSMENT
75 yr old male w/ recent diagnosis of stage IV gastric carcinoma sent from [facility] with noted outpt abnormal labs concerning for new onset acute renal failure.     Problem/Plan - 1:  ·  Problem: Acute kidney failure.  Plan: may have component of dehydration/pre renal but unclear cause of rapid decline; pt denies any new medications  - Renal US:< from: US Kidney and Bladder (05.22.21 @ 17:34) >  Increased renal cortical echogenicity bilaterally which may be secondary to medical renal disease.  Multiple bilateral renal cysts, some of which are complex. In particular, a cyst in the left kidney demonstrates peripheral calcification and internal echogenic material of uncertain etiology.  Mild to moderate left hydronephrosis with echogenic material noted within the left renal collecting system. Nonobstructing right intrarenal calculi.  - avoid nephrotoxic agents  - Cr now slwoly improving .. no obvious etiology .. eosinophils positive.  - reviewed CT and lasix scan findings with urology Dr. Carrillo : pt s/p OR 5/28/21 for cystoscopy/ureteroscopy with stenting   - cont current management, Cr slowly improving   - Hypernatremia - encourage oral in take. IVF per nephrology        Problem/Plan - 2:  ·  Problem: Gastric cancer.  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   nausea -  antiemetics as ordered.     Problem/Plan - 3:  ·  Problem: HTN (hypertension).  Plan:   BP uncontrolled.   increase Norvasc to 10 mg   add Hydralazine 10 mg TID  (home ARB on hold due to Cr)     Problem/Plan - 4:  ·  Problem: back pain : sec to ruptured ? cyst   improved   no neuro deficits  75 yr old male w/ recent diagnosis of stage IV gastric carcinoma sent from [facility] with noted outpt abnormal labs concerning for new onset acute renal failure.     Problem/Plan - 1:  ·  Problem: Acute kidney failure.  Plan: may have component of dehydration/pre renal but unclear cause of rapid decline; pt denies any new medications  - Renal US:< from: US Kidney and Bladder (05.22.21 @ 17:34) >  Increased renal cortical echogenicity bilaterally which may be secondary to medical renal disease.  Multiple bilateral renal cysts, some of which are complex. In particular, a cyst in the left kidney demonstrates peripheral calcification and internal echogenic material of uncertain etiology.  Mild to moderate left hydronephrosis with echogenic material noted within the left renal collecting system. Nonobstructing right intrarenal calculi.  - avoid nephrotoxic agents  - Cr now slwoly improving .. no obvious etiology .. eosinophils positive.  - reviewed CT and lasix scan findings with urology Dr. Carrillo : pt s/p OR 5/28/21 for cystoscopy/ureteroscopy with stenting .. Bx  with invasive urothelial carcinoma : d/w urology and Onco   - cont current management, Cr now plateuing        - Hypernatremia - encourage oral in take. IVF      Problem/Plan - 2:  ·  Problem: Gastric cancer.  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   nausea -  antiemetics as ordered.     Problem/Plan - 3:  ·  Problem: HTN (hypertension).  Plan:   BP uncontrolled: now improved    increased Norvasc to 10 mg   add Hydralazine 10 mg TID  (home ARB on hold due to Cr)     Problem/Plan - 4:  ·  Problem: back pain : sec to ruptured ? cyst   improved   no neuro deficits

## 2021-06-03 NOTE — PROGRESS NOTE ADULT - ASSESSMENT
1. Stage IV Gastric Cancer    -- Follow[place] Dr. Kendall Arellano at [facility]  --Treatment per Dr. Arellano after optimization of renal function and discharge  --S/P echocardiogram per Dr. Arellano's request prior to chemo - normal LVEF    2. Acute Kidney Injury     -- noted to have mild to mod L hydronephrosis with echogenic material in the L renal collecting system  --Cr improving s/p ureteral stent and bx  -- Cr is  2.55 today  -- S/P renal sonogram - renal cysts appear complex and may need additional evaluation  --Hydronephrosis has improved  --Biopsy positive for Invasive Urothelial Carcinoma    3. Anemia    -- multifactorial, sec to CKD and Malignancy  -- counts adequate. Monitor for now    We will continue to follow patient and coordinate with [facility]    Josr Nunes PA-C  Hematology/Oncology  [facility]   [phone] (cell)  [phone] (office)  [phone] (alt office)  Evenings and weekends please call MD on call or office

## 2021-06-03 NOTE — PROGRESS NOTE ADULT - ASSESSMENT
75M with gastric ca found to have GIOVANNI and left hydronephrosis with distal left ureteral lesion s/p left ureteroscopy with biopsy, stent placement  - trend cr, appears to have plateaued. Appreciate renal recommendations   - f/u pathology  - voiding without difficulty s/p thibodeaux removal    Frantz Holbrook MD  [facility]  [address], [address], [place] [address]  Office: [phone] Fax: [phone]

## 2021-06-03 NOTE — PROGRESS NOTE ADULT - ASSESSMENT
75y M with gastric cancer presenting with acute renal failure    #GIOVANNI  - Pt reportedly had normal renal fxn several weeks ago. Now presenting with Cr of 4mg/dl in setting of poor Po intake and ARB use. Possibly now with ATN, recovering   - UA relatively bland, no significant proteinuria  - Cr elevated/stable at 2.54 today   - Renal US findings noted, patient with multiple renal cysts b/l and likely underlying cystic disease but he does not have PCKD as his renal failure is acute, his Cr was .66mg/dl back on 5/14 and acutely eliot to 3.97mg/dl on 5/17 which would not be consistent with a hx of PCKD.  - Left renal collecting system with moderate hydronephrosis with echogenic material despite thibodeaux, appreciate urology recs, he is s/p L ureteral stent and biopsy  - S/p thibodeaux removal   - No renal bx at this point  - Dose meds as per GFR     #Hypernatremia  - S/p D5W  - Last Na improved to 141   - Encourage PO fluids intake     #Metabolic acidosis  - NAGMA, 2/2 GIOVANNI, resolved   - Monitor bicarb     If any questions, please feel free to contact me     Jacob Browning  Nephrology Fellow  [facility] Pager: [phone]  [facility] Pager: [phone]

## 2021-06-03 NOTE — PROGRESS NOTE ADULT - SUBJECTIVE AND OBJECTIVE BOX
The patient is doing well s/p left ureteral biopsy and stent insertion. Repeat imaging shows resolution of his left hydronephrosis. Renal function improved but appears to have plateaued at a Cr of 2.5 (was reportedly 0.6 a month ago). The patient feels well and is voiding without difficulty.     Vital Signs Last 24 Hrs  T(C): 36.6 (03 Jun 2021 04:50), Max: 36.9 (02 Jun 2021 20:31)  T(F): 97.8 (03 Jun 2021 04:50), Max: 98.4 (02 Jun 2021 20:31)  HR: 74 (03 Jun 2021 04:50) (70 - 74)  BP: 153/76 (03 Jun 2021 04:50) (132/73 - 153/76)  BP(mean): --  RR: 18 (03 Jun 2021 04:50) (18 - 18)  SpO2: 96% (03 Jun 2021 04:50) (96% - 99%)    PHYSICAL EXAM:    NAD, well-developed  Abd: soft, NT/ND, No CVAT    I&O's Summary    02 Jun 2021 07:01  -  03 Jun 2021 07:00  --------------------------------------------------------  IN: 0 mL / OUT: 1100 mL / NET: -1100 mL        LABS:                        12.2   6.29  )-----------( 307      ( 03 Jun 2021 07:05 )             37.1     06-03    141  |  109<H>  |  22  ----------------------------<  100<H>  4.1   |  20<L>  |  2.54<H>    Ca    9.6      03 Jun 2021 07:01            Prior notes/chart reviewed.

## 2021-06-03 NOTE — PROGRESS NOTE ADULT - SUBJECTIVE AND OBJECTIVE BOX
[facility] DIVISION OF KIDNEY DISEASES AND HYPERTENSION -- FOLLOW UP NOTE  --------------------------------------------------------------------------------    24 hour events/subjective: Patient was seen and examined at bedside. Reported feeling well. Denies CP, SOB, fever, chills, nausea, vomiting, diarrhea, LE edema or dysuria.    PAST HISTORY  --------------------------------------------------------------------------------  No significant changes to PMH, PSH, FHx, SHx, unless otherwise noted    ALLERGIES & MEDICATIONS  --------------------------------------------------------------------------------  Allergies    aspirin (Rash)    Intolerances    Standing Inpatient Medications  amLODIPine   Tablet 10 milliGRAM(s) Oral daily  dextrose 5%. 1000 milliLiter(s) IV Continuous <Continuous>  heparin   Injectable 5000 Unit(s) SubCutaneous every 8 hours  hydrALAZINE 10 milliGRAM(s) Oral three times a day  latanoprost 0.005% Ophthalmic Solution 1 Drop(s) Both EYES at bedtime  metoclopramide Injectable 5 milliGRAM(s) IV Push three times a day  polyethylene glycol 3350 17 Gram(s) Oral daily  senna 2 Tablet(s) Oral at bedtime    PRN Inpatient Medications  acetaminophen   Tablet .. 975 milliGRAM(s) Oral every 8 hours PRN  simethicone 80 milliGRAM(s) Chew every 6 hours PRN    REVIEW OF SYSTEMS  --------------------------------------------------------------------------------  Gen: No fevers/chills  Respiratory: No dyspnea, cough  CV: No chest pain  GI: No abdominal pain, diarrhea  : No dysuria, hematuria  MSK: No  edema    All other systems were reviewed and are negative, except as noted.    VITALS/PHYSICAL EXAM  --------------------------------------------------------------------------------  T(C): 36.6 (06-03-21 @ 04:50), Max: 36.9 (06-02-21 @ 20:31)  HR: 74 (06-03-21 @ 04:50) (70 - 74)  BP: 153/76 (06-03-21 @ 04:50) (132/73 - 153/76)  RR: 18 (06-03-21 @ 04:50) (18 - 18)  SpO2: 96% (06-03-21 @ 04:50) (96% - 99%)  Wt(kg): --    06-02-21 @ 07:01  -  06-03-21 @ 07:00  --------------------------------------------------------  IN: 0 mL / OUT: 1100 mL / NET: -1100 mL    Physical Exam:  	Gen: NAD  	HEENT: MMM  	Pulm: CTA B/L, no crackles   	CV: S1S2  	Abd: Soft, +BS   	Ext: No LE edema B/L  	Neuro: Awake  	Skin: Warm and dry    LABS/STUDIES  --------------------------------------------------------------------------------              12.2   6.29  >-----------<  307      [06-03-21 @ 07:05]              37.1     141  |  109  |  22  ----------------------------<  100      [06-03-21 @ 07:01]  4.1   |  20  |  2.54        Ca     9.6     [06-03-21 @ 07:01]            Creatinine Trend:  SCr 2.54 [06-03 @ 07:01]  SCr 2.55 [06-02 @ 07:07]  SCr 2.58 [06-01 @ 06:48]  SCr 2.81 [05-31 @ 06:53]  SCr 3.36 [05-30 @ 07:30]    Urinalysis - [05-25-21 @ 07:49]      Color Light Yellow / Appearance Clear / SG 1.014 / pH 6.0      Gluc Negative / Ketone Small  / Bili Negative / Urobili Negative       Blood Moderate / Protein Trace / Leuk Est Large / Nitrite Negative      RBC 11 / WBC 7 / Hyaline 0 / Gran  / Sq Epi  / Non Sq Epi 0 / Bacteria Negative        HBsAg Nonreact      [05-25-21 @ 15:14]  HCV 0.84, Grayzone      [05-25-21 @ 15:14]    WILY: titer 1:640, pattern Homogeneous      [05-25-21 @ 15:16]  dsDNA <12      [05-25-21 @ 15:16]  C3 Complement 113      [05-25-21 @ 15:35]  C4 Complement 36      [05-25-21 @ 15:35]  ANCA: cANCA Negative, pANCA Negative, atypical ANCA Indeterminate      [05-26-21 @ 08:40]  MPO-ANCA <5.0, interpretation: Negative      [05-26-21 @ 08:40]  PR3-ANCA 5.1, interpretation: Negative      [05-26-21 @ 08:40]  anti-GBM 3      [05-25-21 @ 17:51]  PLA2R: BIGG <1.8, IFA --      [05-26-21 @ 09:17]  Free Light Chains: kappa 4.90, lambda 2.72, ratio = 1.80      [05-26 @ 08:40]  Immunofixation Serum:   No Monoclonal Band Identified    Reference Range: None Detected      [05-26-21 @ 08:40]  SPEP Interpretation: Normal Electrophoresis Pattern      [05-26-21 @ 08:40]

## 2021-06-03 NOTE — PROGRESS NOTE ADULT - SUBJECTIVE AND OBJECTIVE BOX
Pathology from ureteral biopsy 5/28/21 reviewed. Consistent with high grade T1 urothelial cell carcinoma of the upper tract.    Discussed with Dr. Mullins. In light of his recent diagnosis of gastric cancer as well, it is unclear whether his metastases are of gastric or urothelial origin.    As the patient had been intending for palliative chemo per records, will defer to Oncology regarding next steps. Patient is followed at [facility] and his urologic care may be transferred there as well.    Frantz Holbrook MD  [facility]  [address] [facility] [address], [place] [address]  Office: [phone] Fax: [phone]

## 2021-06-04 ENCOUNTER — TRANSCRIPTION ENCOUNTER (OUTPATIENT)
Age: 76
End: 2021-06-04

## 2021-06-04 LAB
ANION GAP SERPL CALC-SCNC: 13 MMOL/L — SIGNIFICANT CHANGE UP (ref 5–17)
BUN SERPL-MCNC: 24 MG/DL — HIGH (ref 7–23)
CALCIUM SERPL-MCNC: 9.4 MG/DL — SIGNIFICANT CHANGE UP (ref 8.4–10.5)
CHLORIDE SERPL-SCNC: 110 MMOL/L — HIGH (ref 96–108)
CO2 SERPL-SCNC: 19 MMOL/L — LOW (ref 22–31)
CREAT SERPL-MCNC: 2.48 MG/DL — HIGH (ref 0.5–1.3)
GLUCOSE SERPL-MCNC: 85 MG/DL — SIGNIFICANT CHANGE UP (ref 70–99)
HCT VFR BLD CALC: 34.6 % — LOW (ref 39–50)
HGB BLD-MCNC: 11.4 G/DL — LOW (ref 13–17)
MCHC RBC-ENTMCNC: 27.9 PG — SIGNIFICANT CHANGE UP (ref 27–34)
MCHC RBC-ENTMCNC: 32.9 GM/DL — SIGNIFICANT CHANGE UP (ref 32–36)
MCV RBC AUTO: 84.8 FL — SIGNIFICANT CHANGE UP (ref 80–100)
NRBC # BLD: 0 /100 WBCS — SIGNIFICANT CHANGE UP (ref 0–0)
PLATELET # BLD AUTO: 303 K/UL — SIGNIFICANT CHANGE UP (ref 150–400)
POTASSIUM SERPL-MCNC: 3.9 MMOL/L — SIGNIFICANT CHANGE UP (ref 3.5–5.3)
POTASSIUM SERPL-SCNC: 3.9 MMOL/L — SIGNIFICANT CHANGE UP (ref 3.5–5.3)
RBC # BLD: 4.08 M/UL — LOW (ref 4.2–5.8)
RBC # FLD: 15.5 % — HIGH (ref 10.3–14.5)
SODIUM SERPL-SCNC: 142 MMOL/L — SIGNIFICANT CHANGE UP (ref 135–145)
WBC # BLD: 5.53 K/UL — SIGNIFICANT CHANGE UP (ref 3.8–10.5)
WBC # FLD AUTO: 5.53 K/UL — SIGNIFICANT CHANGE UP (ref 3.8–10.5)

## 2021-06-04 RX ADMIN — Medication 10 MILLIGRAM(S): at 21:40

## 2021-06-04 RX ADMIN — HEPARIN SODIUM 5000 UNIT(S): 5000 INJECTION INTRAVENOUS; SUBCUTANEOUS at 05:11

## 2021-06-04 RX ADMIN — Medication 5 MILLIGRAM(S): at 14:11

## 2021-06-04 RX ADMIN — HEPARIN SODIUM 5000 UNIT(S): 5000 INJECTION INTRAVENOUS; SUBCUTANEOUS at 21:40

## 2021-06-04 RX ADMIN — AMLODIPINE BESYLATE 10 MILLIGRAM(S): 2.5 TABLET ORAL at 05:12

## 2021-06-04 RX ADMIN — LATANOPROST 1 DROP(S): 0.05 SOLUTION/ DROPS OPHTHALMIC; TOPICAL at 21:40

## 2021-06-04 RX ADMIN — Medication 10 MILLIGRAM(S): at 14:30

## 2021-06-04 RX ADMIN — Medication 10 MILLIGRAM(S): at 05:12

## 2021-06-04 RX ADMIN — Medication 5 MILLIGRAM(S): at 05:11

## 2021-06-04 RX ADMIN — HEPARIN SODIUM 5000 UNIT(S): 5000 INJECTION INTRAVENOUS; SUBCUTANEOUS at 14:10

## 2021-06-04 NOTE — DISCHARGE NOTE PROVIDER - CARE PROVIDER_API CALL
Kendall Arellano  Hematology/Oncology  [address] [place], [place] [address]  Phone: [phone]  Fax: [phone]  Follow Up Time:

## 2021-06-04 NOTE — PROGRESS NOTE ADULT - TIME BILLING
pt , jeremy at bedside , np
pt , np , nephro and urology
pt , family member ( nurse in neuro depratment )
pt, np
pt, wife on phone   onc and Np
pt at length   duaghter in [facility] and np
pt,   daughter   onc   urology

## 2021-06-04 NOTE — PROGRESS NOTE ADULT - SUBJECTIVE AND OBJECTIVE BOX
Patient is a 75y old  Male who presents with a chief complaint of GIOVANNI (03 Jun 2021 22:23)    Patient seen this morning. No new complaints. Patient was advised of new diagnosis from ureteral biopsy.    MEDICATIONS  (STANDING):  amLODIPine   Tablet 10 milliGRAM(s) Oral daily  heparin   Injectable 5000 Unit(s) SubCutaneous every 8 hours  hydrALAZINE 10 milliGRAM(s) Oral three times a day  latanoprost 0.005% Ophthalmic Solution 1 Drop(s) Both EYES at bedtime  metoclopramide Injectable 5 milliGRAM(s) IV Push three times a day  polyethylene glycol 3350 17 Gram(s) Oral daily  senna 2 Tablet(s) Oral at bedtime  sodium chloride 0.45%. 1000 milliLiter(s) (60 mL/Hr) IV Continuous <Continuous>    MEDICATIONS  (PRN):  acetaminophen   Tablet .. 975 milliGRAM(s) Oral every 8 hours PRN Mild Pain (1 - 3), Moderate Pain (4 - 6)  simethicone 80 milliGRAM(s) Chew every 6 hours PRN Gas      Vital Signs Last 24 Hrs  T(C): 36.7 (04 Jun 2021 04:19), Max: 36.8 (03 Jun 2021 20:05)  T(F): 98 (04 Jun 2021 04:19), Max: 98.2 (03 Jun 2021 20:05)  HR: 66 (04 Jun 2021 04:19) (66 - 80)  BP: 138/73 (04 Jun 2021 04:19) (132/72 - 149/74)  BP(mean): --  RR: 18 (04 Jun 2021 04:19) (18 - 18)  SpO2: 98% (04 Jun 2021 04:19) (96% - 98%)    PE  NAD  Awake, alert  Anicteric  No rash grossly                          11.4   5.53  )-----------( 303      ( 04 Jun 2021 06:47 )             34.6       06-04    142  |  110<H>  |  24<H>  ----------------------------<  85  3.9   |  19<L>  |  2.48<H>    Ca    9.4      04 Jun 2021 06:47

## 2021-06-04 NOTE — DISCHARGE NOTE PROVIDER - HOSPITAL COURSE
75 yr old male w/ recent diagnosis of stage IV gastric [place] [facility] [place], notably elevated [place]Cr.  Has had reduced oral intake in the last 3 weeks associated with lack of appetite. Currently w/ mild nausea and discomfort.  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  Oncology and Nephrology consulted. 75 yr old male w/ recent diagnosis of stage IV gastric carcinoma sent from [facility] [place] with abnormal labs, notably elevated sCr.  Has had reduced oral intake in the last 3 weeks associated with lack of appetite. Currently w/ mild nausea and discomfort.  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  Oncology and Nephrology consulted.  Renal US showing increased renal cortical echogenicity bilaterally which may be secondary to medical renal disease; multiple bilateral renal cysts, some of which are complex. In particular, a cyst in the left kidney demonstrates peripheral calcification and internal echogenic material of uncertain etiology; mild to moderate left hydronephrosis with echogenic material noted within the left renal collecting system; nonobstructing right intrarenal calculi.    - reviewed CT and lasix scan findings with urology Dr. Carrillo : pt s/p OR 5/28/21 for cystoscopy/ureteroscopy with stenting   - cont current management, Cr slowly improving   - Hypernatremia - encourage oral in take. IVF per nephrology         Problem/Plan - 2:  ·  Problem: Gastric cancer.  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   nausea -  antiemetics as ordered.      Problem/Plan - 3:  ·  Problem: HTN (hypertension).  Plan:   BP uncontrolled.   increase Norvasc to 10 mg   add Hydralazine 10 mg TID  (home ARB on hold due to Cr)      Problem/Plan - 4:  ·  Problem: back pain : sec to ruptured ? cyst   improved   no neuro deficits    75 yr old male w/ recent diagnosis of stage IV gastric carcinoma [place] [facility] [place] abnormal labs, notably elevated sCr.  Has had reduced oral intake in the last 3 weeks associated with lack of appetite. Currently w/ mild nausea and discomfort.  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  Oncology and Nephrology consulted.  Renal US showing increased renal cortical echogenicity bilaterally which may be secondary to medical renal disease; multiple bilateral renal cysts, some of which are complex. In particular, a cyst in the left kidney demonstrates peripheral calcification and internal echogenic material of uncertain etiology; mild to moderate left hydronephrosis with echogenic material noted within the left renal collecting system; nonobstructing right intrarenal calculi.    - reviewed CT and lasix scan findings with urology Dr. Carrillo : pt s/p OR 5/28/21 for cystoscopy/ureteroscopy with stenting   - cont current management, Cr slowly improving   - Hypernatremia - encourage oral in take. IVF per nephrology      Gastric cancer; Recent diagnosis, per pt, known metastasis to peritoneal lymph nodes  Heme onc following, appreciate recs  pain control- tylenol 975 mg TID for mild-mod pain, can add dilaudid po for severe pain if needed.   nausea -  antiemetics as ordered.     HTN (hypertension); BP uncontrolled.   increase Norvasc to 10 mg   add Hydralazine 10 mg TID  (home ARB on hold due to Cr)     Back pain; sec to ruptured ? cyst improved & no neuro deficits    75 yr old male w/ recent diagnosis of stage IV gastric carcinoma sent from [facility] [place] with abnormal labs, notably elevated sCr.  Has had reduced oral intake in the last 3 weeks associated with lack of appetite. Currently w/ mild nausea and discomfort.  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  Oncology and Nephrology consulted.  Renal US showing increased renal cortical echogenicity bilaterally which may be secondary to medical renal disease; multiple bilateral renal cysts, some of which are complex. In particular, a cyst in the left kidney demonstrates peripheral calcification and internal echogenic material of uncertain etiology; mild to moderate left hydronephrosis with echogenic material noted within the left renal collecting system; nonobstructing right intrarenal calculi.    Reviewed CT and lasix scan findings with urology Dr. Carrillo : pt s/p OR 5/28/21 for cystoscopy/ureteroscopy with stenting   Labs trended & Cr. slowly improving   Hypernatremia - encouraged oral in take throughout / IVF's per nephrology recs     Gastric cancer; known metastasis to peritoneal lymph nodes  Heme onc following - tylenol 975 mg TID for mild-mod pain, dilaudid po for severe pain prn if needed and antiemetics for nausea (hypertension); BP uncontrolled.   Norvasc increased to 10 mg add Hydralazine 10 mg TID, (home ARB on hold due to Cr)     Back pain; sec to ruptured ? cyst improved & no neuro deficits    75 yr old male w/ recent diagnosis of stage IV gastric carcinoma sent from [facility] [place] with abnormal labs, notably elevated sCr.  Has had reduced oral intake in the last 3 weeks associated with lack of appetite. Currently w/ mild nausea and discomfort.  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  Oncology and Nephrology consulted.  Renal US showing increased renal cortical echogenicity bilaterally which may be secondary to medical renal disease; multiple bilateral renal cysts, some of which are complex. In particular, a cyst in the left kidney demonstrates peripheral calcification and internal echogenic material of uncertain etiology; mild to moderate left hydronephrosis with echogenic material noted within the left renal collecting system; nonobstructing right intrarenal calculi.    Reviewed CT and lasix scan findings with urology Dr. Carrillo : pt s/p OR 5/28/21 for cystoscopy/ureteroscopy with stenting   Labs trended & Cr. slowly improving     Hypernatremia - encouraged oral in take throughout / IVF's per nephrology recs    Gastric cancer; known metastasis to peritoneal lymph nodes  Heme onc following - tylenol 975 mg TID for mild-mod pain, dilaudid po for severe pain prn if needed and antiemetics for nausea (hypertension); BP uncontrolled.   Norvasc increased to 10 mg add Hydralazine 10 mg TID, (home ARB on hold due to Cr)     Back pain; sec to ruptured ? cyst improved & no neuro deficits     On 6/5 -pt. dc'd home and will f/u with Dr. Arellano as an out-pt. and heme-onc agrees. Discussed with Dr. uMllins

## 2021-06-04 NOTE — DISCHARGE NOTE PROVIDER - NSDCFUADDINST_GEN_ALL_CORE_FT
Followup with Dr. Kendall Arellano at [facility]  Treatment per Dr. Arellano after optimization of renal function and discharge today on 6/5  MS Dr. Arellano - to incorporate treatment

## 2021-06-04 NOTE — DISCHARGE NOTE PROVIDER - NSDCCPCAREPLAN_GEN_ALL_CORE_FT
PRINCIPAL DISCHARGE DIAGNOSIS  Diagnosis: Acute kidney failure  Assessment and Plan of Treatment: Right intrarenal calculi.  Seen and followed by urology   Reviewed CT and lasix scan findings with urology Dr. Carrillo : pt s/p OR 5/28/21 for cystoscopy/ureteroscopy with stenting   Labs trended & Cr. improving  [place] [facility] [place]      SECONDARY DISCHARGE DIAGNOSES  Diagnosis: Gastric cancer  Assessment and Plan of Treatment: Gastric cancer; known metastasis to peritoneal lymph nodes  Heme onc following - tylenol 975 mg TID for mild-mod pain, dilaudid po for severe pain prn if needed and antiemetics for nausea (hypertension); BP uncontrolled.   Norvasc increased to 10 mg add Hydralazine 10 mg TID, (home ARB on hold due to Cr)    Diagnosis: Hypernatremia  Assessment and Plan of Treatment: Hypernatremia - encouraged oral in take throughout / IVF's per nephrology recs    Diagnosis: Back pain  Assessment and Plan of Treatment: Back pain; sec to ruptured  /  cyst improved & no neuro deficits  As above will f[place] [facility]

## 2021-06-04 NOTE — PROGRESS NOTE ADULT - ASSESSMENT
75 yr old male w/ recent diagnosis of stage IV gastric carcinoma [facility] with noted outpt abnormal labs concerning for new onset acute renal failure.     Problem/Plan - 1:  ·  Problem: Acute kidney failure.  Plan: may have component of dehydration/pre renal but unclear cause of rapid decline; pt denies any new medications  - Renal US:< from: US Kidney and Bladder (05.22.21 @ 17:34) >  Increased renal cortical echogenicity bilaterally which may be secondary to medical renal disease.  Multiple bilateral renal cysts, some of which are complex. In particular, a cyst in the left kidney demonstrates peripheral calcification and internal echogenic material of uncertain etiology.  Mild to moderate left hydronephrosis with echogenic material noted within the left renal collecting system. Nonobstructing right intrarenal calculi.  - avoid nephrotoxic agents  - Cr now slwoly improving .. no obvious etiology .. eosinophils positive.  - reviewed CT and lasix scan findings with urology Dr. Carrillo : pt s/p OR 5/28/21 for cystoscopy/ureteroscopy with stenting .. Bx  with invasive urothelial carcinoma : d/w urology and Onco   - cont current management, Cr now plateuing        - Hypernatremia - encourage oral in take. improved      Problem/Plan - 2:  ·  Problem: Gastric cancer.  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   nausea -  antiemetics as ordered.     Problem/Plan - 3:  ·  Problem: HTN (hypertension).  Plan:   BP uncontrolled: now improved    increased Norvasc to 10 mg   add Hydralazine 10 mg TID  (home ARB on hold due to Cr)     Problem/Plan - 4:  ·  Problem: back pain : sec to ruptured ? cyst   improved   no neuro deficits     discussed with pt and wife : plan for dc in 24 hours

## 2021-06-04 NOTE — PROGRESS NOTE ADULT - ASSESSMENT
1. Stage IV Gastric Cancer    -- [place] Kendall [place] [facility] [place]  --Treatment per Dr. Arellano after optimization of renal function and discharge  --S/P echocardiogram per Dr. Arellano's request prior to chemo - normal LVEF    2. Acute Kidney Injury     -- noted to have mild to mod L hydronephrosis with echogenic material in the L renal collecting system  --Cr improving s/p ureteral stent and bx  -- Cr is  2.55 today  -- S/P renal sonogram - renal cysts appear complex and may need additional evaluation  --Hydronephrosis has improved  --Biopsy positive for Invasive Urothelial Carcinoma  --[facility] Dr. Arellano aware - plan is to review slides and try to incorporate treatment for both malignancies if possible    3. Anemia    -- multifactorial, sec to CKD and Malignancy  -- counts adequate. Monitor for now    Patient will [place] [facility]    We will continue to follow patient and coordinate with [facility]    Josr Nunes PA-C  Hematology/Oncology  [facility]   [phone] (cell)  [phone] (office)  [phone] (alt office)  Evenings and weekends please call MD on call or office

## 2021-06-04 NOTE — PROGRESS NOTE ADULT - SUBJECTIVE AND OBJECTIVE BOX
Date of service: 06-04-21 @ 15:57      Patient is a 75y old  Male who presents with a chief complaint of GIOVANNI (04 Jun 2021 13:25)                                                               INTERVAL HPI/OVERNIGHT EVENTS:    REVIEW OF SYSTEMS:     CONSTITUTIONAL: No weakness, fevers or chills  EYES/ENT: No visual changes , no ear ache   NECK: No pain or stiffness  RESPIRATORY: No cough, wheezing,  No shortness of breath  CARDIOVASCULAR: No chest pain or palpitations  GASTROINTESTINAL: No abdominal pain  . No nausea, vomiting, or hematemesis; No diarrhea or constipation. No melena or hematochezia.  GENITOURINARY: No dysuria, frequency or hematuria  NEUROLOGICAL: No numbness or weakness  SKIN: No itching, burning, rashes, or lesions                                                                                                                                                                                                                                                                                 Medications:  MEDICATIONS  (STANDING):  amLODIPine   Tablet 10 milliGRAM(s) Oral daily  heparin   Injectable 5000 Unit(s) SubCutaneous every 8 hours  hydrALAZINE 10 milliGRAM(s) Oral three times a day  latanoprost 0.005% Ophthalmic Solution 1 Drop(s) Both EYES at bedtime  metoclopramide Injectable 5 milliGRAM(s) IV Push three times a day  polyethylene glycol 3350 17 Gram(s) Oral daily  senna 2 Tablet(s) Oral at bedtime  sodium chloride 0.45%. 1000 milliLiter(s) (60 mL/Hr) IV Continuous <Continuous>    MEDICATIONS  (PRN):  acetaminophen   Tablet .. 975 milliGRAM(s) Oral every 8 hours PRN Mild Pain (1 - 3), Moderate Pain (4 - 6)  simethicone 80 milliGRAM(s) Chew every 6 hours PRN Gas       Allergies    aspirin (Rash)    Intolerances      Vital Signs Last 24 Hrs  T(C): 36.8 (04 Jun 2021 14:24), Max: 36.8 (03 Jun 2021 20:05)  T(F): 98.2 (04 Jun 2021 14:24), Max: 98.2 (03 Jun 2021 20:05)  HR: 79 (04 Jun 2021 14:24) (66 - 80)  BP: 104/74 (04 Jun 2021 14:24) (104/74 - 149/74)  BP(mean): --  RR: 18 (04 Jun 2021 14:24) (18 - 18)  SpO2: 98% (04 Jun 2021 14:24) (96% - 98%)  CAPILLARY BLOOD GLUCOSE          06-03 @ 07:01  -  06-04 @ 07:00  --------------------------------------------------------  IN: 1020 mL / OUT: 1750 mL / NET: -730 mL      Physical Exam:    Daily     Daily   General:  Well appearing, NAD, not cachetic  HEENT:  Nonicteric, PERRLA  CV:  RRR, S1S2   Lungs:  CTA B/L, no wheezes, rales, rhonchi  Abdomen:  Soft, non-tender, no distended, positive BS  Extremities:  2+ pulses, no c/c, no edema  Skin:  Warm and dry, no rashes  :  No thibodeaux  Neuro:  AAOx3, non-focal, grossly intact                                                                                                                                                                                                                                                                                                LABS:                               11.4   5.53  )-----------( 303      ( 04 Jun 2021 06:47 )             34.6                      06-04    142  |  110<H>  |  24<H>  ----------------------------<  85  3.9   |  19<L>  |  2.48<H>    Ca    9.4      04 Jun 2021 06:47                         RADIOLOGY & ADDITIONAL TESTS         I personally reviewed: [  ]EKG   [  ]CXR    [  ] CT      A/P:         Discussed with :     [facility] consultants' Notes   Time spent :   Date of service: 06-04-21 @ 15:57      Patient is a 75y old  Male who presents with a chief complaint of GIOVANNI (04 Jun 2021 13:25)                                                               INTERVAL HPI/OVERNIGHT EVENTS:    REVIEW OF SYSTEMS:     CONSTITUTIONAL: No weakness, fevers or chills  RESPIRATORY: No cough, wheezing,  No shortness of breath  CARDIOVASCULAR: No chest pain or palpitations  GASTROINTESTINAL: No abdominal pain  . No nausea, vomiting, or hematemesis; No diarrhea or constipation. No melena or hematochezia.  GENITOURINARY: No dysuria, frequency or hematuria  NEUROLOGICAL: No numbness or weakness  SKIN: No itching, burning, rashes, or lesions                                                                                                                                                                                                                                                                                 Medications:  MEDICATIONS  (STANDING):  amLODIPine   Tablet 10 milliGRAM(s) Oral daily  heparin   Injectable 5000 Unit(s) SubCutaneous every 8 hours  hydrALAZINE 10 milliGRAM(s) Oral three times a day  latanoprost 0.005% Ophthalmic Solution 1 Drop(s) Both EYES at bedtime  metoclopramide Injectable 5 milliGRAM(s) IV Push three times a day  polyethylene glycol 3350 17 Gram(s) Oral daily  senna 2 Tablet(s) Oral at bedtime  sodium chloride 0.45%. 1000 milliLiter(s) (60 mL/Hr) IV Continuous <Continuous>    MEDICATIONS  (PRN):  acetaminophen   Tablet .. 975 milliGRAM(s) Oral every 8 hours PRN Mild Pain (1 - 3), Moderate Pain (4 - 6)  simethicone 80 milliGRAM(s) Chew every 6 hours PRN Gas       Allergies    aspirin (Rash)    Intolerances      Vital Signs Last 24 Hrs  T(C): 36.8 (04 Jun 2021 14:24), Max: 36.8 (03 Jun 2021 20:05)  T(F): 98.2 (04 Jun 2021 14:24), Max: 98.2 (03 Jun 2021 20:05)  HR: 79 (04 Jun 2021 14:24) (66 - 80)  BP: 104/74 (04 Jun 2021 14:24) (104/74 - 149/74)  BP(mean): --  RR: 18 (04 Jun 2021 14:24) (18 - 18)  SpO2: 98% (04 Jun 2021 14:24) (96% - 98%)  CAPILLARY BLOOD GLUCOSE          06-03 @ 07:01  -  06-04 @ 07:00  --------------------------------------------------------  IN: 1020 mL / OUT: 1750 mL / NET: -730 mL      Physical Exam:    Daily     Daily   General:   cachetic  HEENT:  Nonicteric, PERRLA  CV:  RRR, S1S2   Lungs:  CTA B/L, no wheezes, rales, rhonchi  Abdomen:  Soft, non-tender, no distended, positive BS  Extremities: no edema   Neuro:  AAOx3, non-focal, grossly intact                                                                                                                                                                                                                                                                                                LABS:                               11.4   5.53  )-----------( 303      ( 04 Jun 2021 06:47 )             34.6                      06-04    142  |  110<H>  |  24<H>  ----------------------------<  85  3.9   |  19<L>  |  2.48<H>    Ca    9.4      04 Jun 2021 06:47                         RADIOLOGY & ADDITIONAL TESTS         I personally reviewed: [  ]EKG   [  ]CXR    [  ] CT      A/P:         Discussed with :     [facility] consultants' Notes   Time spent :

## 2021-06-04 NOTE — DISCHARGE NOTE PROVIDER - NSDCMRMEDTOKEN_GEN_ALL_CORE_FT
amLODIPine 5 mg oral tablet: 1 tab(s) orally once a day  latanoprost 0.005% ophthalmic solution: 1 drop(s) to each affected eye once a day (in the evening)  Rolling walker: ULISES 99  R 53.1  Ht 170 cm  Wt 70 kg  telmisartan 80 mg oral tablet: 1 tab(s) orally once a day   acetaminophen 325 mg oral tablet: 3 tab(s) orally every 8 hours, As needed, Mild Pain (1 - 3), Moderate Pain (4 - 6)  amLODIPine 10 mg oral tablet: 1 tab(s) orally once a day  hydrALAZINE 10 mg oral tablet: 1 tab(s) orally 3 times a day  latanoprost 0.005% ophthalmic solution: 1 drop(s) to each affected eye once a day (in the evening)  polyethylene glycol 3350 oral powder for reconstitution: 17 gram(s) orally once a day  Rolling walker: ULISES 99  R 53.1  Ht 170 cm  Wt 70 kg  senna oral tablet: 2 tab(s) orally once a day (at bedtime)  simethicone 80 mg oral tablet, chewable: 1 tab(s) orally every 6 hours, As needed, Gas

## 2021-06-05 VITALS
SYSTOLIC BLOOD PRESSURE: 118 MMHG | TEMPERATURE: 98 F | RESPIRATION RATE: 18 BRPM | OXYGEN SATURATION: 95 % | HEART RATE: 74 BPM | DIASTOLIC BLOOD PRESSURE: 73 MMHG

## 2021-06-05 DIAGNOSIS — E87.0 HYPEROSMOLALITY AND HYPERNATREMIA: ICD-10-CM

## 2021-06-05 DIAGNOSIS — M54.9 DORSALGIA, UNSPECIFIED: ICD-10-CM

## 2021-06-05 LAB
ANION GAP SERPL CALC-SCNC: 14 MMOL/L — SIGNIFICANT CHANGE UP (ref 5–17)
BUN SERPL-MCNC: 28 MG/DL — HIGH (ref 7–23)
CALCIUM SERPL-MCNC: 9.7 MG/DL — SIGNIFICANT CHANGE UP (ref 8.4–10.5)
CHLORIDE SERPL-SCNC: 109 MMOL/L — HIGH (ref 96–108)
CO2 SERPL-SCNC: 18 MMOL/L — LOW (ref 22–31)
CREAT SERPL-MCNC: 2.53 MG/DL — HIGH (ref 0.5–1.3)
GLUCOSE SERPL-MCNC: 81 MG/DL — SIGNIFICANT CHANGE UP (ref 70–99)
HCT VFR BLD CALC: 35.2 % — LOW (ref 39–50)
HGB BLD-MCNC: 11.4 G/DL — LOW (ref 13–17)
MCHC RBC-ENTMCNC: 27.7 PG — SIGNIFICANT CHANGE UP (ref 27–34)
MCHC RBC-ENTMCNC: 32.4 GM/DL — SIGNIFICANT CHANGE UP (ref 32–36)
MCV RBC AUTO: 85.4 FL — SIGNIFICANT CHANGE UP (ref 80–100)
NRBC # BLD: 0 /100 WBCS — SIGNIFICANT CHANGE UP (ref 0–0)
PLATELET # BLD AUTO: 341 K/UL — SIGNIFICANT CHANGE UP (ref 150–400)
POTASSIUM SERPL-MCNC: 4.2 MMOL/L — SIGNIFICANT CHANGE UP (ref 3.5–5.3)
POTASSIUM SERPL-SCNC: 4.2 MMOL/L — SIGNIFICANT CHANGE UP (ref 3.5–5.3)
RBC # BLD: 4.12 M/UL — LOW (ref 4.2–5.8)
RBC # FLD: 15.6 % — HIGH (ref 10.3–14.5)
SODIUM SERPL-SCNC: 141 MMOL/L — SIGNIFICANT CHANGE UP (ref 135–145)
WBC # BLD: 6.74 K/UL — SIGNIFICANT CHANGE UP (ref 3.8–10.5)
WBC # FLD AUTO: 6.74 K/UL — SIGNIFICANT CHANGE UP (ref 3.8–10.5)

## 2021-06-05 PROCEDURE — 85610 PROTHROMBIN TIME: CPT

## 2021-06-05 PROCEDURE — 82803 BLOOD GASES ANY COMBINATION: CPT

## 2021-06-05 PROCEDURE — 85027 COMPLETE CBC AUTOMATED: CPT

## 2021-06-05 PROCEDURE — 84133 ASSAY OF URINE POTASSIUM: CPT

## 2021-06-05 PROCEDURE — 96374 THER/PROPH/DIAG INJ IV PUSH: CPT

## 2021-06-05 PROCEDURE — 81001 URINALYSIS AUTO W/SCOPE: CPT

## 2021-06-05 PROCEDURE — 86036 ANCA SCREEN EACH ANTIBODY: CPT

## 2021-06-05 PROCEDURE — U0003: CPT

## 2021-06-05 PROCEDURE — 86225 DNA ANTIBODY NATIVE: CPT

## 2021-06-05 PROCEDURE — 85018 HEMOGLOBIN: CPT

## 2021-06-05 PROCEDURE — 87205 SMEAR GRAM STAIN: CPT

## 2021-06-05 PROCEDURE — 86334 IMMUNOFIX E-PHORESIS SERUM: CPT

## 2021-06-05 PROCEDURE — 85025 COMPLETE CBC W/AUTO DIFF WBC: CPT

## 2021-06-05 PROCEDURE — 97116 GAIT TRAINING THERAPY: CPT

## 2021-06-05 PROCEDURE — 84100 ASSAY OF PHOSPHORUS: CPT

## 2021-06-05 PROCEDURE — 88305 TISSUE EXAM BY PATHOLOGIST: CPT

## 2021-06-05 PROCEDURE — 82436 ASSAY OF URINE CHLORIDE: CPT

## 2021-06-05 PROCEDURE — 84155 ASSAY OF PROTEIN SERUM: CPT

## 2021-06-05 PROCEDURE — 97530 THERAPEUTIC ACTIVITIES: CPT

## 2021-06-05 PROCEDURE — 83521 IG LIGHT CHAINS FREE EACH: CPT

## 2021-06-05 PROCEDURE — 86769 SARS-COV-2 COVID-19 ANTIBODY: CPT

## 2021-06-05 PROCEDURE — 99285 EMERGENCY DEPT VISIT HI MDM: CPT | Mod: 25

## 2021-06-05 PROCEDURE — 80053 COMPREHEN METABOLIC PANEL: CPT

## 2021-06-05 PROCEDURE — 84156 ASSAY OF PROTEIN URINE: CPT

## 2021-06-05 PROCEDURE — 82330 ASSAY OF CALCIUM: CPT

## 2021-06-05 PROCEDURE — 78708 K FLOW/FUNCT IMAGE W/DRUG: CPT

## 2021-06-05 PROCEDURE — 76000 FLUOROSCOPY <1 HR PHYS/QHP: CPT

## 2021-06-05 PROCEDURE — 82570 ASSAY OF URINE CREATININE: CPT

## 2021-06-05 PROCEDURE — 93356 MYOCRD STRAIN IMG SPCKL TRCK: CPT

## 2021-06-05 PROCEDURE — 76770 US EXAM ABDO BACK WALL COMP: CPT

## 2021-06-05 PROCEDURE — C1758: CPT

## 2021-06-05 PROCEDURE — 71045 X-RAY EXAM CHEST 1 VIEW: CPT

## 2021-06-05 PROCEDURE — 80048 BASIC METABOLIC PNL TOTAL CA: CPT

## 2021-06-05 PROCEDURE — 83605 ASSAY OF LACTIC ACID: CPT

## 2021-06-05 PROCEDURE — A9562: CPT

## 2021-06-05 PROCEDURE — 86038 ANTINUCLEAR ANTIBODIES: CPT

## 2021-06-05 PROCEDURE — 85014 HEMATOCRIT: CPT

## 2021-06-05 PROCEDURE — 84300 ASSAY OF URINE SODIUM: CPT

## 2021-06-05 PROCEDURE — 86255 FLUORESCENT ANTIBODY SCREEN: CPT

## 2021-06-05 PROCEDURE — 84165 PROTEIN E-PHORESIS SERUM: CPT

## 2021-06-05 PROCEDURE — 97110 THERAPEUTIC EXERCISES: CPT

## 2021-06-05 PROCEDURE — 84132 ASSAY OF SERUM POTASSIUM: CPT

## 2021-06-05 PROCEDURE — 86160 COMPLEMENT ANTIGEN: CPT

## 2021-06-05 PROCEDURE — 82435 ASSAY OF BLOOD CHLORIDE: CPT

## 2021-06-05 PROCEDURE — 93306 TTE W/DOPPLER COMPLETE: CPT

## 2021-06-05 PROCEDURE — 82947 ASSAY GLUCOSE BLOOD QUANT: CPT

## 2021-06-05 PROCEDURE — C2617: CPT

## 2021-06-05 PROCEDURE — 80074 ACUTE HEPATITIS PANEL: CPT

## 2021-06-05 PROCEDURE — 97161 PT EVAL LOW COMPLEX 20 MIN: CPT

## 2021-06-05 PROCEDURE — 83516 IMMUNOASSAY NONANTIBODY: CPT

## 2021-06-05 PROCEDURE — C1769: CPT

## 2021-06-05 PROCEDURE — U0005: CPT

## 2021-06-05 PROCEDURE — 84295 ASSAY OF SERUM SODIUM: CPT

## 2021-06-05 PROCEDURE — 74176 CT ABD & PELVIS W/O CONTRAST: CPT

## 2021-06-05 RX ORDER — AMLODIPINE BESYLATE 2.5 MG/1
1 TABLET ORAL
Qty: 30 | Refills: 0
Start: 2021-06-05 | End: 2021-07-04

## 2021-06-05 RX ORDER — SIMETHICONE 80 MG/1
1 TABLET, CHEWABLE ORAL
Qty: 56 | Refills: 0
Start: 2021-06-05 | End: 2021-06-18

## 2021-06-05 RX ORDER — TELMISARTAN 20 MG/1
1 TABLET ORAL
Qty: 0 | Refills: 0 | DISCHARGE

## 2021-06-05 RX ORDER — SENNA PLUS 8.6 MG/1
2 TABLET ORAL
Qty: 0 | Refills: 0 | DISCHARGE
Start: 2021-06-05

## 2021-06-05 RX ORDER — AMLODIPINE BESYLATE 2.5 MG/1
1 TABLET ORAL
Qty: 0 | Refills: 0 | DISCHARGE

## 2021-06-05 RX ORDER — ACETAMINOPHEN 500 MG
3 TABLET ORAL
Qty: 0 | Refills: 0 | DISCHARGE
Start: 2021-06-05

## 2021-06-05 RX ORDER — POLYETHYLENE GLYCOL 3350 17 G/17G
17 POWDER, FOR SOLUTION ORAL
Qty: 0 | Refills: 0 | DISCHARGE
Start: 2021-06-05

## 2021-06-05 RX ORDER — HYDRALAZINE HCL 50 MG
1 TABLET ORAL
Qty: 90 | Refills: 0
Start: 2021-06-05 | End: 2021-07-04

## 2021-06-05 RX ADMIN — AMLODIPINE BESYLATE 10 MILLIGRAM(S): 2.5 TABLET ORAL at 05:13

## 2021-06-05 RX ADMIN — Medication 10 MILLIGRAM(S): at 05:13

## 2021-06-05 NOTE — PROGRESS NOTE ADULT - SUBJECTIVE AND OBJECTIVE BOX
The patient has an invasive ureteral tumor. Planning transfer to [facility],.    Vital Signs Last 24 Hrs  T(C): 36.7 (05 Jun 2021 04:55), Max: 36.8 (04 Jun 2021 14:24)  T(F): 98.1 (05 Jun 2021 04:55), Max: 98.2 (04 Jun 2021 14:24)  HR: 68 (05 Jun 2021 04:55) (68 - 79)  BP: 119/68 (05 Jun 2021 04:55) (104/74 - 119/68)  BP(mean): --  RR: 18 (05 Jun 2021 04:55) (18 - 18)  SpO2: 98% (05 Jun 2021 04:55) (97% - 98%)    PHYSICAL EXAM:    NAD, well-developed  Abd: soft, NT/ND, No CVAT    Urine: clear    I&O's Summary    04 Jun 2021 07:01  -  05 Jun 2021 07:00  --------------------------------------------------------  IN: 120 mL / OUT: 850 mL / NET: -730 mL        LABS:                        11.4   6.74  )-----------( 341      ( 05 Jun 2021 08:18 )             35.2     06-05    141  |  109<H>  |  28<H>  ----------------------------<  81  4.2   |  18<L>  |  2.53<H>    Ca    9.7      05 Jun 2021 08:18      Prior notes/chart reviewed.      The patient is urologically stable.  Will seek care at [facility] after discharge.      Plan   Office: [phone]

## 2021-06-05 NOTE — PROGRESS NOTE ADULT - SUBJECTIVE AND OBJECTIVE BOX
Patient is a 75y old  Male who presents with a chief complaint of GIOVANNI (05 Jun 2021 10:32)      MEDICATIONS  (STANDING):  amLODIPine   Tablet 10 milliGRAM(s) Oral daily  heparin   Injectable 5000 Unit(s) SubCutaneous every 8 hours  hydrALAZINE 10 milliGRAM(s) Oral three times a day  latanoprost 0.005% Ophthalmic Solution 1 Drop(s) Both EYES at bedtime  metoclopramide Injectable 5 milliGRAM(s) IV Push three times a day  polyethylene glycol 3350 17 Gram(s) Oral daily  senna 2 Tablet(s) Oral at bedtime  sodium chloride 0.45%. 1000 milliLiter(s) (60 mL/Hr) IV Continuous <Continuous>    MEDICATIONS  (PRN):  acetaminophen   Tablet .. 975 milliGRAM(s) Oral every 8 hours PRN Mild Pain (1 - 3), Moderate Pain (4 - 6)  simethicone 80 milliGRAM(s) Chew every 6 hours PRN Gas      Interim History:  No acute events over night. Vitals stable. Patient in no acute distress      Vital Signs Last 24 Hrs  T(C): 36.7 (05 Jun 2021 04:55), Max: 36.8 (04 Jun 2021 14:24)  T(F): 98.1 (05 Jun 2021 04:55), Max: 98.2 (04 Jun 2021 14:24)  HR: 68 (05 Jun 2021 04:55) (68 - 79)  BP: 119/68 (05 Jun 2021 04:55) (104/74 - 119/68)  BP(mean): --  RR: 18 (05 Jun 2021 04:55) (18 - 18)  SpO2: 98% (05 Jun 2021 04:55) (97% - 98%)    PE  NAD  Awake, alert  Anicteric, MMM  RRR  CTAB  Abd soft, NT, ND  No c/c/e  No rash grossly                            11.4   6.74  )-----------( 341      ( 05 Jun 2021 08:18 )             35.2       06-05    141  |  109<H>  |  28<H>  ----------------------------<  81  4.2   |  18<L>  |  2.53<H>    Ca    9.7      05 Jun 2021 08:18

## 2021-06-05 NOTE — PROGRESS NOTE ADULT - REASON FOR ADMISSION
GIOVANNI

## 2021-06-05 NOTE — PROGRESS NOTE ADULT - NSICDXPILOT_GEN_ALL_CORE
[place]

## 2021-06-05 NOTE — PROGRESS NOTE ADULT - NUTRITIONAL ASSESSMENT
This patient has been assessed with a concern for Malnutrition and has been determined to have a diagnosis/diagnoses of Moderate protein-calorie malnutrition.    This patient is being managed with:   Diet NPO after Midnight-     NPO Start Date: 27-May-2021   NPO Start Time: 23:59  Entered: May 27 2021  7:30PM    Diet Regular-  Entered: May 20 2021 11:14PM    
This patient has been assessed with a concern for Malnutrition and has been determined to have a diagnosis/diagnoses of Moderate protein-calorie malnutrition.    This patient is being managed with:   Diet Regular-  Entered: May 28 2021  1:51PM    
This patient has been assessed with a concern for Malnutrition and has been determined to have a diagnosis/diagnoses of Moderate protein-calorie malnutrition.    This patient is being managed with:   Diet Regular-  Entered: May 28 2021  1:51PM    
This patient has been assessed with a concern for Malnutrition and has been determined to have a diagnosis/diagnoses of Moderate protein-calorie malnutrition.    This patient is being managed with:   Diet Regular-  Entered: May 20 2021 11:14PM    
This patient has been assessed with a concern for Malnutrition and has been determined to have a diagnosis/diagnoses of Moderate protein-calorie malnutrition.    This patient is being managed with:   Diet Regular-  Entered: May 28 2021  1:51PM    
This patient has been assessed with a concern for Malnutrition and has been determined to have a diagnosis/diagnoses of Moderate protein-calorie malnutrition.    This patient is being managed with:   Diet Regular-  Entered: May 28 2021  1:51PM    
This patient has been assessed with a concern for Malnutrition and has been determined to have a diagnosis/diagnoses of Moderate protein-calorie malnutrition.      
This patient has been assessed with a concern for Malnutrition and has been determined to have a diagnosis/diagnoses of Moderate protein-calorie malnutrition.    This patient is being managed with:   Diet Regular-  Entered: May 28 2021  1:51PM    

## 2021-06-05 NOTE — PROGRESS NOTE ADULT - ASSESSMENT
1. Stage IV Gastric Cancer    -- Follows Dr. Kendall Arellano at [facility]  --Treatment per Dr. Arellano after optimization of renal function and discharge  --S/P echocardiogram per Dr. Arellano's request prior to chemo - normal LVEF      2. Acute Kidney Injury     -- noted to have mild to mod L hydronephrosis with echogenic material in the L renal collecting system  --Cr improving s/p ureteral stent and bx  -- Cr is  2.55 today  -- S/P renal sonogram - renal cysts appear complex and may need additional evaluation  --Hydronephrosis has improved  --Biopsy positive for Invasive Urothelial Carcinoma  --[facility] Dr. Arellano aware - plan is to review slides and try to incorporate treatment for both malignancies if possible        3. Anemia    -- multifactorial, sec to CKD and Malignancy  -- counts adequate. Monitor for now        Patient will follow up for both malignancies at [facility]    We will continue to follow patient and coordinate with [facility]      Altaf Panda MD  Hematology/Oncology Consultant  [facility]  Cell: [phone]

## 2021-06-05 NOTE — PROGRESS NOTE ADULT - PROVIDER SPECIALTY LIST ADULT
Heme/Onc
Internal Medicine
Internal Medicine
Urology
Heme/Onc
Heme/Onc
Internal Medicine
Urology
Urology
1. The severity of signs/symptoms.(See ED/admit documents)
Heme/Onc
Internal Medicine
Internal Medicine
Nephrology
Heme/Onc
Internal Medicine
Nephrology
Urology
Heme/Onc
Heme/Onc
Internal Medicine
Nephrology
Nephrology
Internal Medicine
Internal Medicine

## 2021-06-08 PROBLEM — I10 ESSENTIAL (PRIMARY) HYPERTENSION: Chronic | Status: ACTIVE | Noted: 2021-05-20

## 2021-06-08 PROBLEM — I73.00 RAYNAUD'S SYNDROME WITHOUT GANGRENE: Chronic | Status: ACTIVE | Noted: 2021-05-20

## 2021-06-11 ENCOUNTER — APPOINTMENT (OUTPATIENT)
Dept: NEPHROLOGY | Facility: CLINIC | Age: 76
End: 2021-06-11
Payer: MEDICARE

## 2021-06-11 VITALS
HEART RATE: 65 BPM | DIASTOLIC BLOOD PRESSURE: 75 MMHG | TEMPERATURE: 98.1 F | WEIGHT: 158.73 LBS | BODY MASS INDEX: 24.91 KG/M2 | HEIGHT: 67 IN | OXYGEN SATURATION: 91 % | SYSTOLIC BLOOD PRESSURE: 147 MMHG

## 2021-06-11 DIAGNOSIS — Z00.00 ENCOUNTER FOR GENERAL ADULT MEDICAL EXAMINATION W/OUT ABNORMAL FINDINGS: ICD-10-CM

## 2021-06-11 DIAGNOSIS — Z87.891 PERSONAL HISTORY OF NICOTINE DEPENDENCE: ICD-10-CM

## 2021-06-11 DIAGNOSIS — Z86.79 PERSONAL HISTORY OF OTHER DISEASES OF THE CIRCULATORY SYSTEM: ICD-10-CM

## 2021-06-11 DIAGNOSIS — Z80.9 FAMILY HISTORY OF MALIGNANT NEOPLASM, UNSPECIFIED: ICD-10-CM

## 2021-06-11 PROCEDURE — 99215 OFFICE O/P EST HI 40 MIN: CPT

## 2021-06-11 RX ORDER — SIMETHICONE 80 MG/1
80 TABLET, CHEWABLE ORAL
Qty: 360 | Refills: 0 | Status: ACTIVE | COMMUNITY
Start: 2021-06-11

## 2021-06-11 NOTE — REASON FOR VISIT
[Post Hospitalization] : a post hospitalization visit [Family Member] : family member [FreeTextEntry1] : for GIOVANNI on CKD

## 2021-06-11 NOTE — PHYSICAL EXAM
[General Appearance - Alert] : alert [General Appearance - In No Acute Distress] : in no acute distress [Sclera] : the sclera and conjunctiva were normal [Neck Appearance] : the appearance of the neck was normal [] : no respiratory distress [Apical Impulse] : the apical impulse was normal [Heart Rate And Rhythm] : heart rate was normal and rhythm regular [Murmurs] : no murmurs [Bowel Sounds] : normal bowel sounds [Abdomen Soft] : soft [No CVA Tenderness] : no ~M costovertebral angle tenderness [Oriented To Time, Place, And Person] : oriented to person, place, and time [FreeTextEntry1] : no asterexis

## 2021-06-11 NOTE — HISTORY OF PRESENT ILLNESS
[FreeTextEntry1] : Mr. MINER is a 75 year old male with recently dx gastric cancer and urothelial cancer here after a hosp stay at [facility] for GIOVANNI( crt in 4 range). He has PMH only of HTN and Raynauds and was having wt loss and fatigued and dx with CT scan and endoscopy- staged IV HER2 positive gastric cancer. He was planning to start chemo and sought a second opinion at [facility] [place] [facility]. He was having poor appetite and decreased UO and diarrhea for days. He was taking HCTZ and telmisartan at home prior to seeing oncology at [facility] [place]. Labs done at the office in mid may 2021 showed crt in 4 range and up from normal of 1mg/dl and was sent into [facility] for GIOVANNI workup. \par Workup in the hospital by Neph and Urology found ATN as the main culprit and in addition moderate left hydro that required stenting. He had ureteral bx that was concerning for urothelial cancer ( initial read). Renal sonogram did show multiple cysts bl consistent with likely PCKD ( prior PET had shown that as well).\par Cystoscopy/ureteroscopy with stenting .. Bx with invasive urothelial carcinoma. His GIOVANNI improved with stenting and hydration, and holding ARB and thiazides and on dc was 2.5 range. Rpt labs at [facility] showed crt in 2.8 range and chemistries stable. CBC is stable as well. \par He was just sent home on hydralazine and norvasc and bp at home in 130/80 range. He is not eating much and remains constipated. \par He is here with his daughter and feels much better. No n/v. No decrease in appetite, no termors. No decreased sleep. No foamy urine. no hematuria, no dysuria. no confusion. No SOB, KING.\par \par \par Creatinine Trend:\par SCr 2.54 [06-03 @ 07:01]\par SCr 2.55 [06-02 @ 07:07]\par SCr 2.58 [06-01 @ 06:48]\par SCr 2.81 [05-31 @ 06:53]\par SCr 3.36 [05-30 @ 07:30]\par \par WILY: titer 1:640, dsDNA <12 \par C3 Complement 113,C4 Complement 36 \par ANCA: cANCA Negative, pANCA Negative, \par MPO-ANCA <5.0,PR3-ANCA 5.1, \par Free Light Chains: kappa 4.90, lambda 2.72, ratio = 1.80 \par Immunofixation Serum::No Monoclonal Band Identified\par \par

## 2021-06-11 NOTE — ASSESSMENT
[FreeTextEntry1] : Mr. MINER is a 75 year old male with  gastric cancer and recently dx urothelial primary as well, recent GIOVANNI from obstruction improving after recent dx from 4mg/dl to 2.5mg/dl.\par \par #GIOVANNI\par - Pt reportedly had normal renal fxn several weeks ago. Now presenting with Cr of 4mg/dl in setting of poor Po intake and ARB use. Possibly now with ATN, recovering and component of hydro as well. \par - UA relatively bland, no significant proteinuria\par - Renal US findings noted, patient with multiple renal cysts b/l and likely underlying cystic disease but he does not have PCKD as his renal failure is acute, his Cr was .66mg/dl back on 5/14 and acutely eliot to 3.97mg/dl on 5/17\par which would not be consistent with a hx of PCKD. \par - Pt to f.u with Urology closely re the stent placement and recent bx. If crt rises further, explore stent working or not. \par - Dose meds as per GFR asit is closer to 30 given his muscle mass or even lower- when dosing for chemotherapy being planned. \par \par #Hypernatremia\par - Now has resolved\par \par # CKD and planning for immunotherapy and chemotherapy\par Ok to use immunotherapy in CKD patients , no dose adjustments needed\par Other chemo and targeted therapies may require dose adjustments\par Consider NOT using a PPI, but H2 blocker when giving pembrolizumab as PPIs are the biggest risk factor for AIN and GIOVANNI post use of immune checkpt inhibitors. \par Pt told to hold BP meds if gets dehydrated\par \par #edema- likely related to his norvasc use\par \par f/u 6-8 weeks\par Will d/w with Dr Arellano

## 2021-07-30 ENCOUNTER — APPOINTMENT (OUTPATIENT)
Dept: UROLOGY | Facility: CLINIC | Age: 76
End: 2021-07-30
Payer: MEDICARE

## 2021-07-30 ENCOUNTER — NON-APPOINTMENT (OUTPATIENT)
Age: 76
End: 2021-07-30

## 2021-07-30 VITALS — DIASTOLIC BLOOD PRESSURE: 53 MMHG | SYSTOLIC BLOOD PRESSURE: 92 MMHG | HEART RATE: 86 BPM

## 2021-07-30 PROCEDURE — 99205 OFFICE O/P NEW HI 60 MIN: CPT

## 2021-07-30 RX ORDER — LORATADINE 5 MG
17 TABLET,CHEWABLE ORAL
Refills: 0 | Status: ACTIVE | COMMUNITY

## 2021-07-30 RX ORDER — METOCLOPRAMIDE HYDROCHLORIDE 5 MG/1
TABLET ORAL
Refills: 0 | Status: ACTIVE | COMMUNITY

## 2021-07-30 RX ORDER — LATANOPROST 50 UG/ML
0.01 SOLUTION OPHTHALMIC
Refills: 0 | Status: ACTIVE | COMMUNITY

## 2021-07-30 RX ORDER — OXYCODONE HYDROCHLORIDE AND ACETAMINOPHEN 10; 325 MG/1; MG/1
TABLET ORAL
Refills: 0 | Status: ACTIVE | COMMUNITY

## 2021-07-30 RX ORDER — DOCUSATE SODIUM 100 MG/1
100 CAPSULE ORAL
Refills: 0 | Status: ACTIVE | COMMUNITY

## 2021-07-30 NOTE — HISTORY OF PRESENT ILLNESS
[FreeTextEntry1] : 74 yo patient for initial consultation\par recently diagnosed metastatic gastric cancer with gastric and lymph nodes biopsies\par started on chemotherapy that was switched to immunotherapy due to weight loss\par \par is mainly treated at [facility], his main doctors are:\par - oncology - elsie ([facility]) [phone]\par - nephrology - Bert\par - PCP who is also an oncologist - Beto\par - also has a uro-oncologist at [facility]\par \par 5/2021 was at [facility] due to elevated GIOVANNI up to 4.1, was diagnosed with left ureteral lesion\par had ureteroscopy with biopsy and insertion of stent\par - creatinine trended down to normal\par - pathology showed HG urothelial carcinoma with invasion to lamina propria (invasive)\par \par after discussing the findings with oncologist from [facility], he says they reviewed the pathology slides , and it is in fact a metastasis of gastric cancer, and not a second primary TCC\par \par \par his oncologist are aware of this result\par the plan for now is routine exchange of stent and continue systemic treatment\par \par the patient is here with his daughter to establish care since he has moves to live with is daughter in the area, and would like the stent exchanges to be performed closer.\par does not have any urinary complains and no hematuria.\par \par reviewed all patient records with the family\par discussed the treatment plan with the patient and family\par discussed the patient history and plan with his oncologist (DR Estrada)\par will continue his medical imuunotherapy and oncology follow up at [facility]\par I will assist with the exchange of stent and ureteroscopy\par \par will schedule a left ureteroscopy with laser ablation of tumor and stent exchange\par patient informed

## 2021-07-30 NOTE — ASSESSMENT
[FreeTextEntry1] : will discuss plan of care with Dr. Estrada\par will schedule a ureteroscopy with possible laser ablation and stent exchange\par

## 2021-07-30 NOTE — PHYSICAL EXAM
[Normal Appearance] : normal appearance [Well Groomed] : well groomed [General Appearance - In No Acute Distress] : no acute distress [Edema] : no peripheral edema [Respiration, Rhythm And Depth] : normal respiratory rhythm and effort [Exaggerated Use Of Accessory Muscles For Inspiration] : no accessory muscle use [Abdomen Soft] : soft [Abdomen Tenderness] : non-tender [] : no rash [Oriented To Time, Place, And Person] : oriented to person, place, and time [Affect] : the affect was normal [Mood] : the mood was normal [Not Anxious] : not anxious

## 2021-08-01 LAB — BACTERIA UR CULT: NORMAL

## 2021-09-24 ENCOUNTER — OUTPATIENT (OUTPATIENT)
Dept: OUTPATIENT SERVICES | Facility: HOSPITAL | Age: 76
LOS: 1 days | End: 2021-09-24
Payer: MEDICARE

## 2021-09-24 VITALS
HEIGHT: 67 IN | OXYGEN SATURATION: 98 % | TEMPERATURE: 97 F | SYSTOLIC BLOOD PRESSURE: 100 MMHG | WEIGHT: 169.76 LBS | RESPIRATION RATE: 16 BRPM | HEART RATE: 58 BPM | DIASTOLIC BLOOD PRESSURE: 70 MMHG

## 2021-09-24 DIAGNOSIS — N13.5 CROSSING VESSEL AND STRICTURE OF URETER WITHOUT HYDRONEPHROSIS: ICD-10-CM

## 2021-09-24 DIAGNOSIS — Z91.89 OTHER SPECIFIED PERSONAL RISK FACTORS, NOT ELSEWHERE CLASSIFIED: ICD-10-CM

## 2021-09-24 DIAGNOSIS — H26.9 UNSPECIFIED CATARACT: Chronic | ICD-10-CM

## 2021-09-24 DIAGNOSIS — Z01.818 ENCOUNTER FOR OTHER PREPROCEDURAL EXAMINATION: ICD-10-CM

## 2021-09-24 DIAGNOSIS — Z29.9 ENCOUNTER FOR PROPHYLACTIC MEASURES, UNSPECIFIED: ICD-10-CM

## 2021-09-24 LAB
ALBUMIN SERPL ELPH-MCNC: 3.5 G/DL — SIGNIFICANT CHANGE UP (ref 3.3–5.2)
ALP SERPL-CCNC: 88 U/L — SIGNIFICANT CHANGE UP (ref 40–120)
ALT FLD-CCNC: 59 U/L — HIGH
ANION GAP SERPL CALC-SCNC: 10 MMOL/L — SIGNIFICANT CHANGE UP (ref 5–17)
APPEARANCE UR: ABNORMAL
APTT BLD: 44.6 SEC — HIGH (ref 27.5–35.5)
AST SERPL-CCNC: 45 U/L — HIGH
BACTERIA # UR AUTO: ABNORMAL
BASOPHILS # BLD AUTO: 0.02 K/UL — SIGNIFICANT CHANGE UP (ref 0–0.2)
BASOPHILS NFR BLD AUTO: 0.5 % — SIGNIFICANT CHANGE UP (ref 0–2)
BILIRUB SERPL-MCNC: <0.2 MG/DL — LOW (ref 0.4–2)
BILIRUB UR-MCNC: NEGATIVE — SIGNIFICANT CHANGE UP
BLD GP AB SCN SERPL QL: SIGNIFICANT CHANGE UP
BUN SERPL-MCNC: 31.3 MG/DL — HIGH (ref 8–20)
CALCIUM SERPL-MCNC: 9.3 MG/DL — SIGNIFICANT CHANGE UP (ref 8.6–10.2)
CHLORIDE SERPL-SCNC: 109 MMOL/L — HIGH (ref 98–107)
CO2 SERPL-SCNC: 25 MMOL/L — SIGNIFICANT CHANGE UP (ref 22–29)
COLOR SPEC: YELLOW — SIGNIFICANT CHANGE UP
CREAT SERPL-MCNC: 1.56 MG/DL — HIGH (ref 0.5–1.3)
DIFF PNL FLD: ABNORMAL
EOSINOPHIL # BLD AUTO: 0.1 K/UL — SIGNIFICANT CHANGE UP (ref 0–0.5)
EOSINOPHIL NFR BLD AUTO: 2.6 % — SIGNIFICANT CHANGE UP (ref 0–6)
EPI CELLS # UR: SIGNIFICANT CHANGE UP
GLUCOSE SERPL-MCNC: 75 MG/DL — SIGNIFICANT CHANGE UP (ref 70–99)
GLUCOSE UR QL: NEGATIVE — SIGNIFICANT CHANGE UP
HCT VFR BLD CALC: 32.9 % — LOW (ref 39–50)
HGB BLD-MCNC: 10.7 G/DL — LOW (ref 13–17)
IMM GRANULOCYTES NFR BLD AUTO: 0.3 % — SIGNIFICANT CHANGE UP (ref 0–1.5)
INR BLD: 1.1 RATIO — SIGNIFICANT CHANGE UP (ref 0.88–1.16)
KETONES UR-MCNC: NEGATIVE — SIGNIFICANT CHANGE UP
LEUKOCYTE ESTERASE UR-ACNC: ABNORMAL
LYMPHOCYTES # BLD AUTO: 1.31 K/UL — SIGNIFICANT CHANGE UP (ref 1–3.3)
LYMPHOCYTES # BLD AUTO: 33.5 % — SIGNIFICANT CHANGE UP (ref 13–44)
MCHC RBC-ENTMCNC: 30.3 PG — SIGNIFICANT CHANGE UP (ref 27–34)
MCHC RBC-ENTMCNC: 32.5 GM/DL — SIGNIFICANT CHANGE UP (ref 32–36)
MCV RBC AUTO: 93.2 FL — SIGNIFICANT CHANGE UP (ref 80–100)
MONOCYTES # BLD AUTO: 0.42 K/UL — SIGNIFICANT CHANGE UP (ref 0–0.9)
MONOCYTES NFR BLD AUTO: 10.7 % — SIGNIFICANT CHANGE UP (ref 2–14)
NEUTROPHILS # BLD AUTO: 2.05 K/UL — SIGNIFICANT CHANGE UP (ref 1.8–7.4)
NEUTROPHILS NFR BLD AUTO: 52.4 % — SIGNIFICANT CHANGE UP (ref 43–77)
NITRITE UR-MCNC: POSITIVE
PH UR: 7 — SIGNIFICANT CHANGE UP (ref 5–8)
PLATELET # BLD AUTO: 225 K/UL — SIGNIFICANT CHANGE UP (ref 150–400)
POTASSIUM SERPL-MCNC: 4.4 MMOL/L — SIGNIFICANT CHANGE UP (ref 3.5–5.3)
POTASSIUM SERPL-SCNC: 4.4 MMOL/L — SIGNIFICANT CHANGE UP (ref 3.5–5.3)
PROT SERPL-MCNC: 6.4 G/DL — LOW (ref 6.6–8.7)
PROT UR-MCNC: 30 MG/DL
PROTHROM AB SERPL-ACNC: 12.7 SEC — SIGNIFICANT CHANGE UP (ref 10.6–13.6)
RBC # BLD: 3.53 M/UL — LOW (ref 4.2–5.8)
RBC # FLD: 16.5 % — HIGH (ref 10.3–14.5)
RBC CASTS # UR COMP ASSIST: >50 /HPF (ref 0–4)
SODIUM SERPL-SCNC: 144 MMOL/L — SIGNIFICANT CHANGE UP (ref 135–145)
SP GR SPEC: 1.01 — SIGNIFICANT CHANGE UP (ref 1.01–1.02)
UROBILINOGEN FLD QL: NEGATIVE — SIGNIFICANT CHANGE UP
WBC # BLD: 3.91 K/UL — SIGNIFICANT CHANGE UP (ref 3.8–10.5)
WBC # FLD AUTO: 3.91 K/UL — SIGNIFICANT CHANGE UP (ref 3.8–10.5)
WBC UR QL: SIGNIFICANT CHANGE UP

## 2021-09-24 PROCEDURE — 93005 ELECTROCARDIOGRAM TRACING: CPT

## 2021-09-24 PROCEDURE — G0463: CPT

## 2021-09-24 PROCEDURE — 93010 ELECTROCARDIOGRAM REPORT: CPT

## 2021-09-24 RX ORDER — CEFAZOLIN SODIUM 1 G
2000 VIAL (EA) INJECTION ONCE
Refills: 0 | Status: DISCONTINUED | OUTPATIENT
Start: 2021-10-01 | End: 2021-10-15

## 2021-09-24 NOTE — H&P PST ADULT - OTHER CARE PROVIDERS
Dr. John Chaudhry -[phone], [facility] Dr. Arellano Oncology, Dr. Jimenez Cardiology [phone] Dr. Noel Nephrology [phone]

## 2021-09-24 NOTE — H&P PST ADULT - NEGATIVE GASTROINTESTINAL SYMPTOMS
no nausea/no vomiting/no diarrhea/no change in bowel habits/no flatulence/no abdominal pain/no melena/no hematochezia/no steatorrhea/no jaundice/no hiccoughs

## 2021-09-24 NOTE — H&P PST ADULT - NEGATIVE GENERAL GENITOURINARY SYMPTOMS
no renal colic/no flank pain L/no flank pain R/no urine discoloration/no gas in urine/no bladder infections/no incontinence/no dysuria/no urinary hesitancy/normal urinary frequency/no nocturia/normal libido

## 2021-09-24 NOTE — H&P PST ADULT - EKG AND INTERPRETATION
EKG sinus bradycardia 51 BPM EKG pending final cardiac interpretation, EKG faxed to cardiologist and PCP, medical and cardiac clearance pending.

## 2021-09-24 NOTE — H&P PST ADULT - ATTENDING COMMENTS
patient is with recent DVT and on lovenox  due to that will not plan for ureteroscopy at this point  will only schedule stent exchange due to it being inserted on 5/2021  and will re-assess later

## 2021-09-24 NOTE — H&P PST ADULT - PROBLEM SELECTOR PLAN 1
Caprini Score= 12, pt. presently + RLE DVT as of 9/22/21 on lovenox until 10/6/21 pending repeat ultrasound and management recommendations as per surgical team to order appropriate VTE prophylaxis in conjunction with Dr. Arellano and PCP.

## 2021-09-24 NOTE — H&P PST ADULT - NSANTHOSAYNRD_GEN_A_CORE
No. MARICARMEN screening performed.  STOP BANG Legend: 0-2 = LOW Risk; 3-4 = INTERMEDIATE Risk; 5-8 = HIGH Risk

## 2021-09-24 NOTE — H&P PST ADULT - NSICDXPASTMEDICALHX_GEN_ALL_CORE_FT
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

## 2021-09-24 NOTE — H&P PST ADULT - NEGATIVE MUSCULOSKELETAL SYMPTOMS
no arthralgia/no arthritis/no joint swelling/no myalgia/no muscle cramps/no muscle weakness/no stiffness/no neck pain/no arm pain L/no arm pain R/no leg pain L/no leg pain R

## 2021-09-24 NOTE — H&P PST ADULT - PULMONARY EMBOLUS
[FreeTextEntry1] : Ms. 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 Her CHADSVASC score is 4 and she is on anticoagulation due to cutaneous bleed. She takes aspirin intermittently. AF started in March 2018, s/p DCCV and Amiodarone therapy for ~2 months. AF recurred in September 2019. Her EF worsens when in AF. She underwent successful RF ablation of atrial fibrillation on 2/3/2021 (PVI + posterior wall isolation + CTI isthmus ablation).\par \par I recommend to continue Amiodarone 200 mg daily (started after ablation on 2/3/2021); she had 2 weeks interruption.\par \par I recommend to reduce Toprol to 50 mg daily (instead of q12h)\par \par BP uncontrolled today. Patient has no symptoms; not interested in ER visit. I discussed with her low salt diet. I recommend to start Hydralazine 25 mg q8h. I educated patient and son to create BP log and document BP every am. I recommend f/u with PCP in one week to assess BP response to medications.\par \par Patient has no arrhythmias since the catheter ablation. There are no groin hematomas or bleeding. Patient is pleased with results of catheter ablation although is aware with the risk of recurrent symptoms and need for another ablation. Post ablation care was discussed in great length. I discussed with patient contacting me in case of any symptoms suggestive of recurrence.\par \par I recommend to reassess EF in sinus rhythm; if EF reamins low, I will offer patient ICD implant for prevention of SCD.\par \par I discussed with patient plan of care in great details. I answered all her questions to her satisfaction. Patient was pleased with the visit.\par \par Patient will follow with me in 3 months’ time. Please do not hesitate to contact me at [phone] if you have any further questions regarding this patient care.\par \par 
no

## 2021-09-24 NOTE — H&P PST ADULT - HISTORY OF PRESENT ILLNESS
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 Pt. with history of CKD, HTN, HLD, s/p gastric cancer 5/2021-initiated on chemotherapy which is now complete, and immunotherapy is ongoing every other week. Pt. was noted this past Monday 9/20/21 Dr. Arellano Oncologist suggested sonogram of the RLE secondary to RLE >LLE swelling -pt. was initiated on lovenox until 10/6/21 with plan to continue AC depending on repeat sonogram. 5/20/21 Pt. was hospitalized pt. was noted with an obstruction of the kidney with a tumor - a left kidney stent was placed 5/28/21- which was biopsied, noted to be metastasis of gastric cancer, as per daughter most recent imaging tumor not present, pt. refereed for aforementioned procedure to follow up and asses this. Pt. denies CP or SOB with rest on exam today. Pt. s/p pfizer covid 19 vaccine 4/21/21.  76 y/o male presents today to PST pending upcoming left ureteroscopy laser ablation of tumor left ureteral stent exchange on 10/1/21 with Dr Humberto Peck secondaryto crossing vessel and stricture of ureter without hydronephrosis. Pt. with history of CKD, HTN, HLD, glaucoma, gastric cancer 5/2021-initiated on chemotherapy which is now complete, and immunotherapy is ongoing every other week. Pt. was noted this past Monday 9/20/21 Dr. Arellano Oncologist suggested sonogram of the RLE secondary to RLE >LLE swelling -pt. was initiated on lovenox until 10/6/21 with plan to continue AC depending on repeat sonogram. 5/20/21 Pt. was hospitalized pt. was noted with an obstruction of the kidney with a tumor - a left kidney stent was placed 5/28/21- which was biopsied, noted to be metastasis of gastric cancer, as per daughter most recent imaging tumor not present, pt. refereed for aforementioned procedure to follow up and asses this. Pt. denies CP or SOB with rest on exam today. Pt. s/p pfizer covid 19 vaccine 4/21/21.

## 2021-09-24 NOTE — H&P PST ADULT - NSICDXFAMILYHX_GEN_ALL_CORE_FT
FAMILY HISTORY:  Mother  Still living? No  FH: liver cancer, Age at diagnosis: Age Unknown  FHx: hypertension, Age at diagnosis: Age Unknown

## 2021-09-24 NOTE — H&P PST ADULT - PROBLEM SELECTOR PLAN 3
Medical and cardiac clearance pending for left ureteroscopy laser ablation of tumor left ureteral stent exchange on 10/1/21 with Dr. 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

## 2021-09-24 NOTE — H&P PST ADULT - ASSESSMENT
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 Pt. with history of CKD, HTN, HLD, glaucoma, gastric cancer 2021-initiated on chemotherapy which is now complete, and immunotherapy is ongoing every other week. Pt. was noted this past 21 Dr. Arellano Oncologist suggested sonogram of the RLE secondary to RLE >LLE swelling -pt. was initiated on lovenox until 10/6/21 with plan to continue AC depending on repeat sonogram. 21 Pt. was hospitalized pt. was noted with an obstruction of the kidney with a tumor - a left kidney stent was placed 21- which was biopsied, noted to be metastasis of gastric cancer, as per daughter most recent imaging tumor not present, pt. referred for aforementioned procedure to follow up and asses this. Pt. denies CP or SOB with rest on exam today. Pt. s/p pfizer covid 19 vaccine 21.     Spoke to Erica surgical coordinator at surgeon Dr. Cisco Cash's office regarding pt's report of RLE DVT recently started on lovenox until 10/6/21. Erica states she will speak to surgeon regarding this in setting of upcoming procedure and discuss with patient should any changes arise. Lovenox instructions, follow up and management as per Dr. Arellano.   Pt. to have medical clearance with Dr. William Chaudhry on 21. Pt. and daughter verbalized agreement and understanding.   Pt. saw cardiology Dr. Jimenez for clearance on 21, pending receipt of cardiac clearance. Pt. and daughter verbalized agreement and understanding.   Pt instructed to stop vitamins/supplements/herbal medications/NSAIDS for one week prior to surgery and discuss with PMD. Pt. and daughter verbalized agreement and understanding.   Patient educated on surgical scrub, COVID testing 21, preadmission instructions, medical clearance and day of procedure medications, Pt. and daughter verbalized agreement and understanding.   Pt. and daughter educated regarding presurgical education and instructions via both written and verbal means of communication. Pt. and daughter verbalized agreement and understanding.       OPIOID RISK TOOL    AURELIA EACH BOX THAT APPLIES AND ADD TOTALS AT THE END    FAMILY HISTORY OF SUBSTANCE ABUSE                 FEMALE         MALE                                                Alcohol                             [  ]1 pt          [  ]3pts                                               Illegal Durgs                     [  ]2 pts        [  ]3pts                                               Rx Drugs                           [  ]4 pts        [  ]4 pts    PERSONAL HISTORY OF SUBSTANCE ABUSE                                                                                          Alcohol                             [  ]3 pts       [  ]3 pts                                               Illegal Drugs                     [  ]4 pts        [  ]4 pts                                               Rx Drugs                           [  ]5 pts        [  ]5 pts    AGE BETWEEN 16-45 YEARS                                      [  ]1 pt         [  ]1 pt    HISTORY OF PREADOLESCENT   SEXUAL ABUSE                                                             [  ]3 pts        [  ]0pts    PSYCHOLOGICAL DISEASE                     ADD, OCD, Bipolar, Schizophrenia        [  ]2 pts         [  ]2 pts                      Depression                                               [  ]1 pt           [  ]1 pt           SCORING TOTAL   (add numbers and type here)              (0)                                     A score of 3 or lower indicated LOW risk for future opioid abuse  A score of 4 to 7 indicated moderate risk for future opioid abuse  A score of 8 or higher indicates a high risk for opioid abuse      CAPRINI SCORE    AGE RELATED RISK FACTORS                                                             [ ] Age 41-60 years                                            (1 Point)  [ ] Age: 61-74 years                                           (2 Points)                 [x ] Age= 75 years                                                (3 Points)             DISEASE RELATED RISK FACTORS                                                       [x ] Edema in the lower extremities                 (1 Point)                     [ ] Varicose veins                                               (1 Point)                                 [x ] BMI > 25 Kg/m2                                            (1 Point)                                  [ ] Serious infection (ie PNA)                            (1 Point)                     [ ] Lung disease ( COPD, Emphysema)            (1 Point)                                                                          [ ] Acute myocardial infarction                         (1 Point)                  [ ] Congestive heart failure (in the previous month)  (1 Point)         [ ] Inflammatory bowel disease                            (1 Point)                  [ ] Central venous access, PICC or Port               (2 points)       (within the last month)                                                                [ ] Stroke (in the previous month)                        (5 Points)    [x ] Previous or present malignancy                       (2 points)                                                                                                                                                         HEMATOLOGY RELATED FACTORS                                                         [x ] Prior episodes of VTE                                     (3 Points)                     [ ] Positive family history for VTE                      (3 Points)                  [ ] Prothrombin 75938 A                                     (3 Points)                     [ ] Factor V Leiden                                                (3 Points)                        [ ] Lupus anticoagulants                                      (3 Points)                                                           [ ] Anticardiolipin antibodies                              (3 Points)                                                       [ ] High homocysteine in the blood                   (3 Points)                                             [ ] Other congenital or acquired thrombophilia      (3 Points)                                                [ ] Heparin induced thrombocytopenia                  (3 Points)                                        MOBILITY RELATED FACTORS  [ ] Bed rest                                                         (1 Point)  [ ] Plaster cast                                                    (2 points)  [ ] Bed bound for more than 72 hours           (2 Points)    GENDER SPECIFIC FACTORS  [ ] Pregnancy or had a baby within the last month   (1 Point)  [ ] Post-partum < 6 weeks                                   (1 Point)  [ ] Hormonal therapy  or oral contraception   (1 Point)  [ ] History of pregnancy complications              (1 point)  [ ] Unexplained or recurrent              (1 Point)    OTHER RISK FACTORS                                           (1 Point)  [ x] BMI >40, smoking, diabetes requiring insulin, chemotherapy  blood transfusions and length of surgery over 2 hours    SURGERY RELATED RISK FACTORS  [ ]  Section within the last month     (1 Point)  [ ] Minor surgery                                                  (1 Point)  [ ] Arthroscopic surgery                                       (2 Points)  [x ] Planned major surgery lasting more            (2 Points)      than 45 minutes     [ ] Elective hip or knee joint replacement       (5 points)       surgery                                                TRAUMA RELATED RISK FACTORS  [ ] Fracture of the hip, pelvis, or leg                       (5 Points)  [ ] Spinal cord injury resulting in paralysis             (5 points)       (in the previous month)    [ ] Paralysis  (less than 1 month)                             (5 Points)  [ ] Multiple Trauma within 1 month                        (5 Points)    Total Score [     12   ]    Caprini Score 0-2: Low Risk, NO VTE prophylaxis required for most patients, encourage ambulation  Caprini Score 3-6: Moderate Risk , pharmacologic VTE prophylaxis is indicated for most patients (in the absence of contraindications)  Caprini Score Greater than or =7: High risk, pharmocologic VTE prophylaxis indicated for most patients (in the absence of contraindications)

## 2021-09-26 DIAGNOSIS — Z01.818 ENCOUNTER FOR OTHER PREPROCEDURAL EXAMINATION: ICD-10-CM

## 2021-09-27 LAB
CULTURE RESULTS: SIGNIFICANT CHANGE UP
SPECIMEN SOURCE: SIGNIFICANT CHANGE UP

## 2021-09-28 ENCOUNTER — APPOINTMENT (OUTPATIENT)
Dept: DISASTER EMERGENCY | Facility: CLINIC | Age: 76
End: 2021-09-28

## 2021-09-29 LAB — SARS-COV-2 N GENE NPH QL NAA+PROBE: NOT DETECTED

## 2021-09-30 ENCOUNTER — TRANSCRIPTION ENCOUNTER (OUTPATIENT)
Age: 76
End: 2021-09-30

## 2021-10-01 ENCOUNTER — OUTPATIENT (OUTPATIENT)
Dept: OUTPATIENT SERVICES | Facility: HOSPITAL | Age: 76
LOS: 1 days | End: 2021-10-01
Payer: MEDICARE

## 2021-10-01 ENCOUNTER — APPOINTMENT (OUTPATIENT)
Dept: UROLOGY | Facility: HOSPITAL | Age: 76
End: 2021-10-01

## 2021-10-01 ENCOUNTER — RESULT REVIEW (OUTPATIENT)
Age: 76
End: 2021-10-01

## 2021-10-01 VITALS
DIASTOLIC BLOOD PRESSURE: 66 MMHG | TEMPERATURE: 98 F | RESPIRATION RATE: 16 BRPM | OXYGEN SATURATION: 100 % | HEIGHT: 67 IN | WEIGHT: 169.76 LBS | HEART RATE: 66 BPM | SYSTOLIC BLOOD PRESSURE: 159 MMHG

## 2021-10-01 VITALS
HEART RATE: 61 BPM | OXYGEN SATURATION: 100 % | TEMPERATURE: 97 F | DIASTOLIC BLOOD PRESSURE: 78 MMHG | RESPIRATION RATE: 12 BRPM | SYSTOLIC BLOOD PRESSURE: 148 MMHG

## 2021-10-01 DIAGNOSIS — C79.9 SECONDARY MALIGNANT NEOPLASM OF UNSPECIFIED SITE: ICD-10-CM

## 2021-10-01 DIAGNOSIS — N13.5 CROSSING VESSEL AND STRICTURE OF URETER WITHOUT HYDRONEPHROSIS: ICD-10-CM

## 2021-10-01 DIAGNOSIS — H26.9 UNSPECIFIED CATARACT: Chronic | ICD-10-CM

## 2021-10-01 LAB — ABO RH CONFIRMATION: SIGNIFICANT CHANGE UP

## 2021-10-01 PROCEDURE — 76000 FLUOROSCOPY <1 HR PHYS/QHP: CPT

## 2021-10-01 PROCEDURE — 36415 COLL VENOUS BLD VENIPUNCTURE: CPT

## 2021-10-01 PROCEDURE — C1769: CPT

## 2021-10-01 PROCEDURE — C2617: CPT

## 2021-10-01 PROCEDURE — C1758: CPT

## 2021-10-01 PROCEDURE — 74420 UROGRAPHY RTRGR +-KUB: CPT | Mod: 26

## 2021-10-01 PROCEDURE — 52310 CYSTOSCOPY AND TREATMENT: CPT

## 2021-10-01 PROCEDURE — 52332 CYSTOSCOPY AND TREATMENT: CPT | Mod: LT

## 2021-10-01 RX ORDER — ONDANSETRON 8 MG/1
4 TABLET, FILM COATED ORAL ONCE
Refills: 0 | Status: DISCONTINUED | OUTPATIENT
Start: 2021-10-01 | End: 2021-10-01

## 2021-10-01 RX ORDER — SODIUM CHLORIDE 9 MG/ML
1000 INJECTION, SOLUTION INTRAVENOUS
Refills: 0 | Status: DISCONTINUED | OUTPATIENT
Start: 2021-10-01 | End: 2021-10-01

## 2021-10-01 RX ORDER — FENTANYL CITRATE 50 UG/ML
50 INJECTION INTRAVENOUS
Refills: 0 | Status: DISCONTINUED | OUTPATIENT
Start: 2021-10-01 | End: 2021-10-01

## 2021-10-28 ENCOUNTER — APPOINTMENT (OUTPATIENT)
Dept: NEPHROLOGY | Facility: CLINIC | Age: 76
End: 2021-10-28

## 2021-11-05 PROBLEM — N13.5 CROSSING VESSEL AND STRICTURE OF URETER WITHOUT HYDRONEPHROSIS: Chronic | Status: ACTIVE | Noted: 2021-09-24

## 2021-11-05 PROBLEM — N18.9 CHRONIC KIDNEY DISEASE, UNSPECIFIED: Chronic | Status: ACTIVE | Noted: 2021-09-24

## 2021-11-05 PROBLEM — Z92.29 PERSONAL HISTORY OF OTHER DRUG THERAPY: Chronic | Status: ACTIVE | Noted: 2021-09-24

## 2021-11-05 PROBLEM — E78.5 HYPERLIPIDEMIA, UNSPECIFIED: Chronic | Status: ACTIVE | Noted: 2021-09-24

## 2021-11-30 ENCOUNTER — APPOINTMENT (OUTPATIENT)
Dept: NEPHROLOGY | Facility: CLINIC | Age: 76
End: 2021-11-30
Payer: MEDICARE

## 2021-11-30 VITALS
BODY MASS INDEX: 27.34 KG/M2 | TEMPERATURE: 97.88 F | DIASTOLIC BLOOD PRESSURE: 87 MMHG | WEIGHT: 174.17 LBS | OXYGEN SATURATION: 100 % | HEIGHT: 67 IN | SYSTOLIC BLOOD PRESSURE: 181 MMHG | HEART RATE: 66 BPM

## 2021-11-30 DIAGNOSIS — Q61.3 POLYCYSTIC KIDNEY, UNSPECIFIED: ICD-10-CM

## 2021-11-30 PROCEDURE — 99214 OFFICE O/P EST MOD 30 MIN: CPT | Mod: GC

## 2021-11-30 RX ORDER — FLUCONAZOLE 100 MG/1
100 TABLET ORAL
Refills: 0 | Status: DISCONTINUED | COMMUNITY
End: 2021-11-30

## 2021-11-30 RX ORDER — AMLODIPINE BESYLATE 10 MG/1
10 TABLET ORAL DAILY
Qty: 30 | Refills: 3 | Status: DISCONTINUED | COMMUNITY
Start: 2021-06-11 | End: 2021-11-30

## 2021-11-30 RX ORDER — AMOXICILLIN AND CLAVULANATE POTASSIUM 875; 125 MG/1; MG/1
875-125 TABLET, COATED ORAL
Qty: 14 | Refills: 0 | Status: DISCONTINUED | COMMUNITY
Start: 2021-09-28 | End: 2021-11-30

## 2021-11-30 NOTE — ASSESSMENT
[FreeTextEntry1] : Mr. MINER is a 76 year old male with  gastric cancer and recently dx urothelial primary as well, recent GIOVANNI from obstruction improving after recent dx from 4mg/dl to 2.5mg/dl.\par \par #GIOVANNI\par - Pt reportedly had normal renal fxn prior to hospitalization in 6/2021. He presented with Cr of 4mg/dl in setting of poor PO intake and ARB use. \par --- suspect that his renal injury was likely ATN\par - most recent Cr from this morning being 2.1mg/dL; advised patient to \par - Renal US during hospitalization findings noted, patient with multiple renal cysts b/l and likely underlying cystic disease; he underwent a repeat [place] [facility] [place] follow up with it. \par - Pat follows up with urology with recent L ureteral stent change on 10/1/2021\par - Dose meds as per GFR as it is closer to 30 given his muscle mass or even lower- when dosing for chemotherapy being planned. \par \par #HTN\par - was on amlodipine and then discontinued for hypotension with PRN use of hydralazine rigth now \par BP uncontrolled with systolic ranging in the 140-150 \par - /87 in office and 158/86 upon repeat \par - suggest restarting amlodipine 5mg and discontinuing hydralazine \par --- advised patient to continue monitoring BP at home and if remains elevated then can increase amlodipine to 10mg daily \par \par # CKD and planning for immunotherapy and chemotherapy\par It is ok to use immunotherapy in CKD patients without any dose adjustments \par However other chemo and targeted therapies may require dose adjustments\par would avoid using a PPI when giving pembrolizumab as PPIs are the biggest risk factor for AIN and GIOVANNI post use of immune checkpt inhibitors. \par ---- can use H2 blocker\par \par \par

## 2021-11-30 NOTE — HISTORY OF PRESENT ILLNESS
[FreeTextEntry1] : Mr. MINER is a 76 year old male with recently dx gastric cancer and urothelial cancer here after a hosp stay at [facility] for GIOVANNI( crt in 4 range). He has PMH only of HTN and Raynauds and was having wt loss and fatigued and dx with CT scan and endoscopy- staged IV HER2 positive gastric cancer. He was planning to start chemo and [place] [facility] [place] He was having poor appetite and decreased UO and diarrhea for days. He was taking HCTZ and telmisartan at home prior to [place] [facility] [place] Labs done at the office in mid may 2021 showed crt in 4 range and up from normal of 1mg/dl and was sent into [facility] for GIOVANNI workup. \par Workup in the hospital by Neph and Urology found ATN as the main culprit and in addition moderate left hydro that required stenting. He had ureteral bx that was concerning for urothelial cancer (initial read). Renal sonogram did show multiple cysts bl consistent with likely PCKD (prior PET had shown that as well).\par Cystoscopy/ureteroscopy with stenting. Bx with invasive urothelial carcinoma. His GIOVANNI improved with stenting and hydration, and holding ARB and thiazides and on dc was 2.5 range.\par \par The patient presents to the clinic today for a follow up appointment. He reports he has been feeling well and admits to improved energy. [place] was evaluated [place] [facility] [place] and states that his blood work showed a creatinine of 2.1mg/dL which was elevated from his previous creatinine of 1.9mg/dL. He does admit that following the result of the blood work he received a 1L bolus of NS at the center. \par The patient also reports that a few months ago his PCP stopped his amlodipine because he was having low blood pressure readings and complained of some episodes of lightheadedness. At this time he is only on hydralazine which he takes as needed for elevated blood pressures. He reports his systolic pressures have been ranging between 140-150. \par \par \par

## 2022-01-13 ENCOUNTER — APPOINTMENT (OUTPATIENT)
Dept: NEPHROLOGY | Facility: CLINIC | Age: 77
End: 2022-01-13
Payer: MEDICARE

## 2022-01-13 VITALS — SYSTOLIC BLOOD PRESSURE: 107 MMHG | DIASTOLIC BLOOD PRESSURE: 67 MMHG

## 2022-01-13 VITALS — DIASTOLIC BLOOD PRESSURE: 80 MMHG | WEIGHT: 172 LBS | SYSTOLIC BLOOD PRESSURE: 145 MMHG | BODY MASS INDEX: 26.94 KG/M2

## 2022-01-13 DIAGNOSIS — N18.32 CHRONIC KIDNEY DISEASE, STAGE 3B: ICD-10-CM

## 2022-01-13 PROCEDURE — 99212 OFFICE O/P EST SF 10 MIN: CPT | Mod: 95

## 2022-01-13 RX ORDER — APIXABAN 2.5 MG/1
2.5 TABLET, FILM COATED ORAL
Qty: 180 | Refills: 2 | Status: ACTIVE | COMMUNITY
Start: 2022-01-13

## 2022-01-13 RX ORDER — HYDRALAZINE HYDROCHLORIDE 10 MG/1
10 TABLET ORAL
Qty: 90 | Refills: 0 | Status: DISCONTINUED | COMMUNITY
Start: 2021-06-11 | End: 2022-01-13

## 2022-01-13 NOTE — REASON FOR VISIT
[Home] : at home, [unfilled] , at the time of the visit. [Medical Office: ([facility])___] : at the medical office located in  [Family Member] : family member [Verbal consent obtained from patient] : the patient, [unfilled] Excision Depth: adipose tissue

## 2022-01-13 NOTE — HISTORY OF PRESENT ILLNESS
[FreeTextEntry1] : Mr. MINER is a 76 year old male with recently dx gastric cancer and urothelial cancer here after a hosp stay at [facility] for GIOVANNI( crt in 4 range). He has PMH only of HTN and Raynauds and was having wt loss and fatigued and dx with CT scan and endoscopy- staged IV HER2 positive gastric cancer. He was planning to start chemo and sought a second opinion [place] [facility] [place] He was having poor appetite and decreased UO and diarrhea for days. He was taking HCTZ and telmisartan at home prior to seeing oncology at [facility] [place]. Labs done at the office in mid may 2021 showed crt in 4 range and up from normal of 1mg/dl and was sent into [facility] for GIOVANNI workup. \par Workup in the hospital by Neph and Urology found ATN as the main culprit and in addition moderate left hydro that required stenting. He had ureteral bx that was concerning for urothelial cancer (initial read). Renal sonogram did show multiple cysts bl consistent with likely PCKD (prior PET had shown that as well).\par Cystoscopy/ureteroscopy with stenting. Bx with invasive urothelial carcinoma. His GIOVANNI improved with stenting and hydration, and holding ARB and thiazides and on dc was 2.5 range.\par \par SInce last visit, his labs in nov 2021 was 2.1 crt. In dec 2021, hospitalized in [place] for UTI and found to have incidental COVID as well. Got abx and no COVID specific treatment, no MAB. on solo pembro for now.\par His most recent crt is 1.7 per [place] discharge. His bp is 110- 105 SBP range. He was taken off his hydralazine as well. \par \par \par

## 2022-01-14 ENCOUNTER — APPOINTMENT (OUTPATIENT)
Dept: UROLOGY | Facility: CLINIC | Age: 77
End: 2022-01-14
Payer: MEDICARE

## 2022-01-14 VITALS
DIASTOLIC BLOOD PRESSURE: 72 MMHG | SYSTOLIC BLOOD PRESSURE: 122 MMHG | BODY MASS INDEX: 27 KG/M2 | HEIGHT: 67 IN | HEART RATE: 70 BPM | WEIGHT: 172 LBS

## 2022-01-14 PROCEDURE — 99214 OFFICE O/P EST MOD 30 MIN: CPT

## 2022-01-15 NOTE — HISTORY OF PRESENT ILLNESS
[FreeTextEntry1] : 77 yo M for follow up\par has advanced gastric cancer\par see full note with all providers in previous visit\par \par recently elevated liver enzymes\par  treatment was stopped\par still undergoing work up to determine status of disease and treatment needed\par \par recently admitted with UTI and improved with abx treatment\par had stent exchange in 10/2021\par \par due to the recent worsening of liver enzymes and uncertainty regarding disease status will not attempt ay stent removal at this time.\par the family agrees and understands\par also, due to the recent UTI will schedule stent exchange earlier\par \par plan:\par - will continue medical and oncological treatment\par - will schedule stent exchange in the next few weeks

## 2022-01-15 NOTE — ASSESSMENT
[FreeTextEntry1] : \par plan:\par - will continue medical and oncological treatment\par - will schedule stent exchange in the next few weeks

## 2022-01-25 ENCOUNTER — OUTPATIENT (OUTPATIENT)
Dept: OUTPATIENT SERVICES | Facility: HOSPITAL | Age: 77
LOS: 1 days | End: 2022-01-25
Payer: MEDICARE

## 2022-01-25 VITALS
HEIGHT: 67 IN | DIASTOLIC BLOOD PRESSURE: 80 MMHG | TEMPERATURE: 98 F | RESPIRATION RATE: 16 BRPM | SYSTOLIC BLOOD PRESSURE: 142 MMHG | WEIGHT: 171.96 LBS | HEART RATE: 65 BPM

## 2022-01-25 DIAGNOSIS — H40.9 UNSPECIFIED GLAUCOMA: ICD-10-CM

## 2022-01-25 DIAGNOSIS — I10 ESSENTIAL (PRIMARY) HYPERTENSION: ICD-10-CM

## 2022-01-25 DIAGNOSIS — Z29.9 ENCOUNTER FOR PROPHYLACTIC MEASURES, UNSPECIFIED: ICD-10-CM

## 2022-01-25 DIAGNOSIS — C79.9 SECONDARY MALIGNANT NEOPLASM OF UNSPECIFIED SITE: ICD-10-CM

## 2022-01-25 DIAGNOSIS — I82.409 ACUTE EMBOLISM AND THROMBOSIS OF UNSPECIFIED DEEP VEINS OF UNSPECIFIED LOWER EXTREMITY: ICD-10-CM

## 2022-01-25 DIAGNOSIS — Z01.818 ENCOUNTER FOR OTHER PREPROCEDURAL EXAMINATION: ICD-10-CM

## 2022-01-25 DIAGNOSIS — H26.9 UNSPECIFIED CATARACT: Chronic | ICD-10-CM

## 2022-01-25 LAB
ANION GAP SERPL CALC-SCNC: 10 MMOL/L — SIGNIFICANT CHANGE UP (ref 5–17)
APPEARANCE UR: CLEAR — SIGNIFICANT CHANGE UP
APTT BLD: 32.2 SEC — SIGNIFICANT CHANGE UP (ref 27.5–35.5)
BACTERIA # UR AUTO: ABNORMAL
BILIRUB UR-MCNC: NEGATIVE — SIGNIFICANT CHANGE UP
BLD GP AB SCN SERPL QL: SIGNIFICANT CHANGE UP
BUN SERPL-MCNC: 35 MG/DL — HIGH (ref 8–20)
CALCIUM SERPL-MCNC: 9.9 MG/DL — SIGNIFICANT CHANGE UP (ref 8.6–10.2)
CHLORIDE SERPL-SCNC: 109 MMOL/L — HIGH (ref 98–107)
CO2 SERPL-SCNC: 26 MMOL/L — SIGNIFICANT CHANGE UP (ref 22–29)
COLOR SPEC: YELLOW — SIGNIFICANT CHANGE UP
CREAT SERPL-MCNC: 1.57 MG/DL — HIGH (ref 0.5–1.3)
DIFF PNL FLD: ABNORMAL
EPI CELLS # UR: SIGNIFICANT CHANGE UP
GLUCOSE SERPL-MCNC: 123 MG/DL — HIGH (ref 70–99)
GLUCOSE UR QL: NEGATIVE — SIGNIFICANT CHANGE UP
HCT VFR BLD CALC: 33.2 % — LOW (ref 39–50)
HGB BLD-MCNC: 10.6 G/DL — LOW (ref 13–17)
INR BLD: 1.06 RATIO — SIGNIFICANT CHANGE UP (ref 0.88–1.16)
KETONES UR-MCNC: NEGATIVE — SIGNIFICANT CHANGE UP
LEUKOCYTE ESTERASE UR-ACNC: ABNORMAL
MCHC RBC-ENTMCNC: 28.1 PG — SIGNIFICANT CHANGE UP (ref 27–34)
MCHC RBC-ENTMCNC: 31.9 GM/DL — LOW (ref 32–36)
MCV RBC AUTO: 88.1 FL — SIGNIFICANT CHANGE UP (ref 80–100)
NITRITE UR-MCNC: POSITIVE
PH UR: 6 — SIGNIFICANT CHANGE UP (ref 5–8)
PLATELET # BLD AUTO: 212 K/UL — SIGNIFICANT CHANGE UP (ref 150–400)
POTASSIUM SERPL-MCNC: 4.2 MMOL/L — SIGNIFICANT CHANGE UP (ref 3.5–5.3)
POTASSIUM SERPL-SCNC: 4.2 MMOL/L — SIGNIFICANT CHANGE UP (ref 3.5–5.3)
PROT UR-MCNC: 15
PROTHROM AB SERPL-ACNC: 12.3 SEC — SIGNIFICANT CHANGE UP (ref 10.6–13.6)
RBC # BLD: 3.77 M/UL — LOW (ref 4.2–5.8)
RBC # FLD: 17 % — HIGH (ref 10.3–14.5)
RBC CASTS # UR COMP ASSIST: ABNORMAL /HPF (ref 0–4)
SARS-COV-2 RNA SPEC QL NAA+PROBE: SIGNIFICANT CHANGE UP
SODIUM SERPL-SCNC: 145 MMOL/L — SIGNIFICANT CHANGE UP (ref 135–145)
SP GR SPEC: 1.01 — SIGNIFICANT CHANGE UP (ref 1.01–1.02)
UROBILINOGEN FLD QL: NEGATIVE — SIGNIFICANT CHANGE UP
WBC # BLD: 4.14 K/UL — SIGNIFICANT CHANGE UP (ref 3.8–10.5)
WBC # FLD AUTO: 4.14 K/UL — SIGNIFICANT CHANGE UP (ref 3.8–10.5)
WBC UR QL: ABNORMAL /HPF (ref 0–5)

## 2022-01-25 PROCEDURE — G0463: CPT

## 2022-01-25 PROCEDURE — 93010 ELECTROCARDIOGRAM REPORT: CPT

## 2022-01-25 PROCEDURE — 93005 ELECTROCARDIOGRAM TRACING: CPT

## 2022-01-25 RX ORDER — ENOXAPARIN SODIUM 100 MG/ML
0 INJECTION SUBCUTANEOUS
Qty: 0 | Refills: 0 | DISCHARGE

## 2022-01-25 RX ORDER — DOCUSATE SODIUM 100 MG
1 CAPSULE ORAL
Qty: 0 | Refills: 0 | DISCHARGE

## 2022-01-25 RX ORDER — CEFAZOLIN SODIUM 1 G
2000 VIAL (EA) INJECTION ONCE
Refills: 0 | Status: DISCONTINUED | OUTPATIENT
Start: 2022-01-27 | End: 2022-02-10

## 2022-01-25 NOTE — H&P PST ADULT - PROBLEM SELECTOR PLAN 5
Exchange of Left Ureteral stent by Dr. Cash on 1/27/22. Covid vaccine series completed, card in chart, (pfizer X 2 4/21/21) covid test is on 1/25/22. Medical Clearance pending

## 2022-01-25 NOTE — H&P PST ADULT - PROBLEM SELECTOR PLAN 1
continue medications as instructed. Asked the patient to take the Blood pressure medication/ heart medication or any other important meds with a sip of water in the AM of surgery

## 2022-01-25 NOTE — H&P PST ADULT - PROBLEM SELECTOR PLAN 3
Caprini Score 1 High risk,  Surgical team should assess /Strongly recommend pharmacological and mechanical measures for VTE prophylaxis

## 2022-01-25 NOTE — H&P PST ADULT - NSICDXPASTMEDICALHX_GEN_ALL_CORE_FT
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

## 2022-01-25 NOTE — H&P PST ADULT - ASSESSMENT
76 year old male  diagnosed with metastatic gastric cancer in beginning of  had chemotherapy that was switched to immunotherapy due to weight loss, 2021 was at [facility] due to elevated GIOVANNI up to 4.1, was diagnosed with left ureteral lesion, had ureteroscopy with biopsy and insertion of stent ,pathology showed HG urothelial carcinoma with invasion to lamina propria (invasive),does not have any urinary complains and no hematuria. He is here for PST for a Exchange of Left Ureteral stent by Dr. Cash on 22. Covid vaccine series completed, card in chart, (pfizer X 2 21) covid test is on 22. Medical Clearance pending     medications reviewed, instructions given on what medications to take and what not to take. Asked the patient to take the Blood pressure medication/ heart medication or any other important meds with a sip of water in the AM of surgery (Amlodipine), patient was Instructed by Dr. Cash to stop Eliquis on 22 AM. All other meds can be continued till the night before surgery. Asked the pt not to take any NSAID's 5-7 days before surgery and told the pt Tylenol is okay to take for pain, pt verbalized understanding. pt is not taking ASA or Plavix at this time.     CAPRINI VTE 2.0 SCORE [CLOT updated 2019]    AGE RELATED RISK FACTORS                                                       MOBILITY RELATED FACTORS  [ ] Age 41-60 years                                            (1 Point)                    [ ] Bed rest                                                        (1 Point)  [ ] Age: 61-74 years                                           (2 Points)                  [ ] Plaster cast                                                   (2 Points)  [x ] Age= 75 years                                              (3 Points)                    [ ] Bed bound for more than 72 hours                 (2 Points)    DISEASE RELATED RISK FACTORS                                               GENDER SPECIFIC FACTORS  [x ] Edema in the lower extremities                       (1 Point)              [ ] Pregnancy                                                     (1 Point)  [ ] Varicose veins                                               (1 Point)                     [ ] Post-partum < 6 weeks                                   (1 Point)             [ x] BMI > 25 Kg/m2                                            (1 Point)                     [ ] Hormonal therapy  or oral contraception          (1 Point)                 [ ] Sepsis (in the previous month)                        (1 Point)               [ ] History of pregnancy complications                 (1 point)  [ ] Pneumonia or serious lung disease                                               [ ] Unexplained or recurrent                     (1 Point)           (in the previous month)                               (1 Point)  [ ] Abnormal pulmonary function test                     (1 Point)                 SURGERY RELATED RISK FACTORS  [ ] Acute myocardial infarction                              (1 Point)               [ ]  Section                                             (1 Point)  [ ] Congestive heart failure (in the previous month)  (1 Point)      [x ] Minor surgery                                                  (1 Point)   [ ] Inflammatory bowel disease                             (1 Point)               [ ] Arthroscopic surgery                                        (2 Points)  [ ] Central venous access                                      (2 Points)                [ ] General surgery lasting more than 45 minutes (2 points)  [x ] Malignancy- Present or previous                   (2 Points)                [ ] Elective arthroplasty                                         (5 points)    [ ] Stroke (in the previous month)                          (5 Points)                                                                                                                                                           HEMATOLOGY RELATED FACTORS                                                 TRAUMA RELATED RISK FACTORS  [x ] Prior episodes of VTE                                     (3 Points)                [ ] Fracture of the hip, pelvis, or leg                       (5 Points)  [ ] Positive family history for VTE                         (3 Points)             [ ] Acute spinal cord injury (in the previous month)  (5 Points)  [ ] Prothrombin 72065 A                                     (3 Points)               [ ] Paralysis  (less than 1 month)                             (5 Points)  [ ] Factor V Leiden                                             (3 Points)                  [ ] Multiple Trauma within 1 month                        (5 Points)  [ ] Lupus anticoagulants                                     (3 Points)                                                           [ ] Anticardiolipin antibodies                               (3 Points)                                                       [ ] High homocysteine in the blood                      (3 Points)                                             [ ] Other congenital or acquired thrombophilia      (3 Points)                                                [ ] Heparin induced thrombocytopenia                  (3 Points)                                     Total Score [      11    ]    OPIOID RISK TOOL    AURELIA EACH BOX THAT APPLIES AND ADD TOTALS AT THE END    FAMILY HISTORY OF SUBSTANCE ABUSE                 FEMALE         MALE                                                Alcohol                             [  ]1 pt          [  ]3pts                                               Illegal Drugs                     [  ]2 pts        [  ]3pts                                               Rx Drugs                           [  ]4 pts        [  ]4 pts    PERSONAL HISTORY OF SUBSTANCE ABUSE                                                                                          Alcohol                             [  ]3 pts       [  ]3 pts                                               Illegal Drugs                     [  ]4 pts        [  ]4 pts                                               Rx Drugs                           [  ]5 pts        [  ]5 pts    AGE BETWEEN 16-45 YEARS                                      [  ]1 pt         [  ]1 pt    HISTORY OF PREADOLESCENT   SEXUAL ABUSE                                                             [  ]3 pts        [  ]0pts    PSYCHOLOGICAL DISEASE                     ADD, OCD, Bipolar, Schizophrenia        [  ]2 pts         [  ]2 pts                      Depression                                               [  ]1 pt           [  ]1 pt           SCORING TOTAL   (add numbers and type here)              ( 0)                                     A score of 3 or lower indicated LOW risk for future opioid abuse  A score of 4 to 7 indicated moderate risk for future opioid abuse  A score of 8 or higher indicates a high risk for opioid abuse

## 2022-01-25 NOTE — H&P PST ADULT - HISTORY OF PRESENT ILLNESS
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  does not have any urinary complains and no hematuria.       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 He is here for PST for a Exchange of Left Ureteral stent by Dr. Cash on 1/27/22. Covid vaccine series completed, card in chart, (pfizer X 2 4/21/21) covid test is on 1/25/22. Medical Clearance pending

## 2022-01-25 NOTE — H&P PST ADULT - OTHER CARE PROVIDERS
will get the last cardiac note from DR. Jimenez will get the last cardiac note from DR. Jimenez [phone]

## 2022-01-25 NOTE — H&P PST ADULT - NSANTHOBSERVEDRD_ENT_A_CORE
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 Moderate background diabetic retinopathy, no signs of neovascularization noted. Plan: No treatment necessary at this time. Patient was instructed to monitor vision for sudden changes and to call if visual changes noted. Discussed ocular and systemic benefits of blood sugar control. No

## 2022-01-25 NOTE — ASU PATIENT PROFILE, ADULT - FALL HARM RISK - HARM RISK INTERVENTIONS

## 2022-01-26 ENCOUNTER — TRANSCRIPTION ENCOUNTER (OUTPATIENT)
Age: 77
End: 2022-01-26

## 2022-01-26 DIAGNOSIS — N39.0 URINARY TRACT INFECTION, SITE NOT SPECIFIED: ICD-10-CM

## 2022-01-26 DIAGNOSIS — A49.9 URINARY TRACT INFECTION, SITE NOT SPECIFIED: ICD-10-CM

## 2022-01-27 ENCOUNTER — APPOINTMENT (OUTPATIENT)
Dept: UROLOGY | Facility: HOSPITAL | Age: 77
End: 2022-01-27

## 2022-01-27 ENCOUNTER — OUTPATIENT (OUTPATIENT)
Dept: INPATIENT UNIT | Facility: HOSPITAL | Age: 77
LOS: 1 days | End: 2022-01-27
Payer: MEDICARE

## 2022-01-27 VITALS
SYSTOLIC BLOOD PRESSURE: 146 MMHG | WEIGHT: 175.93 LBS | RESPIRATION RATE: 16 BRPM | HEART RATE: 73 BPM | OXYGEN SATURATION: 100 % | HEIGHT: 67 IN | DIASTOLIC BLOOD PRESSURE: 57 MMHG | TEMPERATURE: 98 F

## 2022-01-27 VITALS
RESPIRATION RATE: 18 BRPM | OXYGEN SATURATION: 100 % | SYSTOLIC BLOOD PRESSURE: 159 MMHG | HEART RATE: 55 BPM | TEMPERATURE: 98 F | DIASTOLIC BLOOD PRESSURE: 77 MMHG

## 2022-01-27 DIAGNOSIS — C79.9 SECONDARY MALIGNANT NEOPLASM OF UNSPECIFIED SITE: ICD-10-CM

## 2022-01-27 DIAGNOSIS — H26.9 UNSPECIFIED CATARACT: Chronic | ICD-10-CM

## 2022-01-27 PROCEDURE — 50385 CHANGE STENT VIA TRANSURETH: CPT

## 2022-01-27 PROCEDURE — C2617: CPT

## 2022-01-27 PROCEDURE — C1769: CPT

## 2022-01-27 PROCEDURE — 52332 CYSTOSCOPY AND TREATMENT: CPT | Mod: LT

## 2022-01-27 PROCEDURE — 74420 UROGRAPHY RTRGR +-KUB: CPT | Mod: 26

## 2022-01-27 PROCEDURE — 76000 FLUOROSCOPY <1 HR PHYS/QHP: CPT

## 2022-01-27 PROCEDURE — C1758: CPT

## 2022-01-27 DEVICE — GWIRE SENSOR DUAL-FLEX NITINOL0.038INX150CM: Type: IMPLANTABLE DEVICE | Status: FUNCTIONAL

## 2022-01-27 DEVICE — CATH URET AXCESS 6FR 70CM: Type: IMPLANTABLE DEVICE | Status: FUNCTIONAL

## 2022-01-27 DEVICE — CATH URET STONE DISPLC DL 10FR: Type: IMPLANTABLE DEVICE | Status: FUNCTIONAL

## 2022-01-27 DEVICE — GWIRE NITINOL STRT TIP .035IN 150CM: Type: IMPLANTABLE DEVICE | Status: FUNCTIONAL

## 2022-01-27 DEVICE — GUIDEWIRE SENSOR ANG 150CM .038: Type: IMPLANTABLE DEVICE | Status: FUNCTIONAL

## 2022-01-27 DEVICE — STENT URET PERCFLX PLUS 7X26 W/O GDWIRE: Type: IMPLANTABLE DEVICE | Status: FUNCTIONAL

## 2022-01-27 RX ORDER — ONDANSETRON 8 MG/1
4 TABLET, FILM COATED ORAL ONCE
Refills: 0 | Status: DISCONTINUED | OUTPATIENT
Start: 2022-01-27 | End: 2022-01-27

## 2022-01-27 RX ORDER — ACETAMINOPHEN 500 MG
1000 TABLET ORAL ONCE
Refills: 0 | Status: DISCONTINUED | OUTPATIENT
Start: 2022-01-27 | End: 2022-01-27

## 2022-01-27 RX ORDER — SODIUM CHLORIDE 9 MG/ML
3 INJECTION INTRAMUSCULAR; INTRAVENOUS; SUBCUTANEOUS ONCE
Refills: 0 | Status: DISCONTINUED | OUTPATIENT
Start: 2022-01-27 | End: 2022-01-27

## 2022-01-27 RX ORDER — HYDROMORPHONE HYDROCHLORIDE 2 MG/ML
0.5 INJECTION INTRAMUSCULAR; INTRAVENOUS; SUBCUTANEOUS
Refills: 0 | Status: DISCONTINUED | OUTPATIENT
Start: 2022-01-27 | End: 2022-01-27

## 2022-01-27 RX ORDER — ACETAMINOPHEN 500 MG
975 TABLET ORAL ONCE
Refills: 0 | Status: COMPLETED | OUTPATIENT
Start: 2022-01-27 | End: 2022-01-27

## 2022-01-27 NOTE — ASU DISCHARGE PLAN (ADULT/PEDIATRIC) - NS MD DC FALL RISK RISK
For information on Fall & Injury Prevention, visit: [email or web address] OR  [email or web address] OR  https://www.cdc.gov/steadi/patient.html

## 2022-01-27 NOTE — ASU DISCHARGE PLAN (ADULT/PEDIATRIC) - CARE PROVIDER_API CALL
Cisco Cash)  Urology  [address]  [place] [address]  Phone: [phone]  Fax: [phone]  Follow Up Time: Routine

## 2022-01-27 NOTE — BRIEF OPERATIVE NOTE - OPERATION/FINDINGS
retrograde with stent in place documented prompt filling of the collecting system  stent exchanged with ease  7/26 no string

## 2022-01-28 LAB
CULTURE RESULTS: SIGNIFICANT CHANGE UP
SPECIMEN SOURCE: SIGNIFICANT CHANGE UP

## 2022-03-02 ENCOUNTER — RX RENEWAL (OUTPATIENT)
Age: 77
End: 2022-03-02

## 2022-03-02 ENCOUNTER — APPOINTMENT (OUTPATIENT)
Dept: UROLOGY | Facility: CLINIC | Age: 77
End: 2022-03-02
Payer: MEDICARE

## 2022-03-02 PROCEDURE — 99214 OFFICE O/P EST MOD 30 MIN: CPT

## 2022-03-02 NOTE — HISTORY OF PRESENT ILLNESS
[FreeTextEntry1] : 77 yo M for follow up \par known advanced metastatic gastric cancer\par including previous lesion in ureter requiring left ureteral stent\par last exchange of stent 1 month ago\par \par liver function have improved\par patient doing better\par still on treatment, planned for next imaging in 2 weeks\par will only attempt removal of stent once resolution of disease\par \par had hematuria last week and stopped his eliquis for two days with improvement\par says the color is relatively clear pink, but does not completely clear\par urine now is very light pink, no clots\par \par patient instructed to increase fluid intake\par if color worsens or there are any clots will need to contact us and possibly go to the ER\par will start abx treatment for risk of UTI as well\par \par plan:\par - sent urine for culture\par - started augmentin\par - instructed the patient to consult us if the color worsens [Hematuria - Gross] : gross hematuria [None] : None

## 2022-03-02 NOTE — ASSESSMENT
[FreeTextEntry1] : \par plan:\par - sent urine for culture\par - started augmentin\par - instructed the patient to consult us if the color worsens

## 2022-03-05 ENCOUNTER — APPOINTMENT (OUTPATIENT)
Dept: ULTRASOUND IMAGING | Facility: CLINIC | Age: 77
End: 2022-03-05
Payer: MEDICARE

## 2022-03-05 ENCOUNTER — OUTPATIENT (OUTPATIENT)
Dept: OUTPATIENT SERVICES | Facility: HOSPITAL | Age: 77
LOS: 1 days | End: 2022-03-05
Payer: MEDICARE

## 2022-03-05 DIAGNOSIS — H26.9 UNSPECIFIED CATARACT: Chronic | ICD-10-CM

## 2022-03-05 DIAGNOSIS — C79.9 SECONDARY MALIGNANT NEOPLASM OF UNSPECIFIED SITE: ICD-10-CM

## 2022-03-05 PROCEDURE — 76770 US EXAM ABDO BACK WALL COMP: CPT

## 2022-03-05 PROCEDURE — 76770 US EXAM ABDO BACK WALL COMP: CPT | Mod: 26

## 2022-03-08 ENCOUNTER — NON-APPOINTMENT (OUTPATIENT)
Age: 77
End: 2022-03-08

## 2022-03-31 ENCOUNTER — NON-APPOINTMENT (OUTPATIENT)
Age: 77
End: 2022-03-31

## 2022-04-29 ENCOUNTER — APPOINTMENT (OUTPATIENT)
Dept: NEPHROLOGY | Facility: CLINIC | Age: 77
End: 2022-04-29
Payer: MEDICARE

## 2022-04-29 VITALS
TEMPERATURE: 97.8 F | WEIGHT: 174.16 LBS | OXYGEN SATURATION: 96 % | HEIGHT: 67 IN | DIASTOLIC BLOOD PRESSURE: 76 MMHG | BODY MASS INDEX: 27.34 KG/M2 | SYSTOLIC BLOOD PRESSURE: 177 MMHG

## 2022-04-29 VITALS — DIASTOLIC BLOOD PRESSURE: 80 MMHG | SYSTOLIC BLOOD PRESSURE: 130 MMHG

## 2022-04-29 DIAGNOSIS — I10 ESSENTIAL (PRIMARY) HYPERTENSION: ICD-10-CM

## 2022-04-29 DIAGNOSIS — R31.0 GROSS HEMATURIA: ICD-10-CM

## 2022-04-29 DIAGNOSIS — N17.9 ACUTE KIDNEY FAILURE, UNSPECIFIED: ICD-10-CM

## 2022-04-29 PROCEDURE — 99214 OFFICE O/P EST MOD 30 MIN: CPT

## 2022-04-29 NOTE — PHYSICAL EXAM
[Outer Ear] : the ears and nose were normal in appearance [Oropharynx] : the oropharynx was normal [Neck Appearance] : the appearance of the neck was normal [Neck Cervical Mass (___cm)] : no neck mass was observed [Jugular Venous Distention Increased] : there was no jugular-venous distention [Thyroid Diffuse Enlargement] : the thyroid was not enlarged [Thyroid Nodule] : there were no palpable thyroid nodules [] : no respiratory distress [Auscultation Breath Sounds / Voice Sounds] : lungs were clear to auscultation bilaterally [Heart Rate And Rhythm] : heart rate was normal and rhythm regular [Heart Sounds Gallop] : no gallops [Heart Sounds] : normal S1 and S2 [Murmurs] : no murmurs [Heart Sounds Pericardial Friction Rub] : no pericardial rub [FreeTextEntry1] : 1= edema bl [Skin Color & Pigmentation] : normal skin color and pigmentation [Oriented To Time, Place, And Person] : oriented to person, place, and time

## 2022-04-29 NOTE — ASSESSMENT
[FreeTextEntry1] : Mr. MINER is a 76 year old male with  gastric cancer and recently dx urothelial primary as well, recent GIOVANNI from obstruction improving after recent dx from 4mg/dl to 2.5mg/dl and now 1.7 -1.9 range.\par \par #GIOVANNI\par - Pt reportedly had normal renal fxn prior to hospitalization in 6/2021. He presented with Cr of 4mg/dl in setting of poor PO intake and ARB use. \par --- suspect that his renal injury was likely ATN that is improving now back to 1.7-2.0 range\par - cont norvasc for now\par - monitor urinalysis for proteinuria and hematuria and check urine rbp/crt ratio given recent booster+ ICI therapy and risk of AIN with that\par \par #HTN\par -as above, off hydralazine\par -for now daily norvasc, doesn't want to increase dose due to edema\par \par # CKD and planning for immunotherapy and chemotherapy\par It is ok to use immunotherapy in CKD patients without any dose adjustments \par However other chemo and targeted therapies may require dose adjustments\par would avoid using a PPI when giving pembrolizumab as PPIs are the biggest risk factor for AIN and GIOVANNI post use of immune checkpt inhibitors. \par --can use H2 blocker\par -- Given recent increase in crt, check urinary rbp/crt ratio and monitor q 2 weeks crt as also got booster recently for covid and on ICI therapy\par \par # ureteral stent- pt to see Urology for usual replacements\par Renal sonogram reviewed and cysts bl noted\par Left large cyst is complex- pt and daughter aware and plan to get MRI with Urology\par \par \par f/i 3 months\par

## 2022-04-29 NOTE — HISTORY OF PRESENT ILLNESS
[FreeTextEntry1] : Mr. MINER is a 76 year old male with recently dx gastric cancer and urothelial cancer here after a hosp stay at [facility] for GIOVANNI( crt in 4 range). He has PMH only of HTN and Raynauds and was having wt loss and fatigued and dx with CT scan and endoscopy- staged IV HER2 positive gastric cancer. He was planning to start chemo and [place] [facility] He was having poor appetite and decreased UO and diarrhea for days. He was taking HCTZ and telmisartan at home prior to seeing [place]ology [place] [facility] Labs done at the office in mid may 2021 showed crt in 4 range and up from normal of 1mg/dl and was sent into [facility] for GIOVANNI workup. \par Workup in the hospital by Neph and Urology found ATN as the main culprit and in addition moderate left hydro that required stenting. He had ureteral bx that was concerning for urothelial cancer (initial read). Renal sonogram did show multiple cysts bl consistent with likely PCKD (prior PET had shown that as well).\par Cystoscopy/ureteroscopy with stenting. Bx with invasive urothelial carcinoma. His GIOVANNI improved with stenting and hydration, and holding ARB and thiazides and on dc was 2.5 range.\par \par SInce last visit, his labs in Jan 2022 crt  was 1.7 and then most recent 1.9mg/dl . In dec 2021, hospitalized in  for UTI and found to have incidental COVID as well. Got abx and no COVID specific treatment, no MAB. on solo pembro for now for almost 1 year.  He got his COVID pfizer booster 1 month ago now.\par He has chronic LE edema. Bp at home in 120-140 SBP range. He feels overall well.\par Cancer seems to be responding. \par \par \par

## 2022-05-03 LAB
APPEARANCE: CLEAR
BACTERIA: NEGATIVE
BILIRUBIN URINE: NEGATIVE
BLOOD URINE: NEGATIVE
COLOR: NORMAL
CREAT SPEC-SCNC: 59 MG/DL
CREAT/PROT UR: 0.7 RATIO
GLUCOSE QUALITATIVE U: NEGATIVE
HYALINE CASTS: 0 /LPF
KETONES URINE: NEGATIVE
LEUKOCYTE ESTERASE URINE: NEGATIVE
MICROSCOPIC-UA: NORMAL
MISCELLANEOUS TEST: NORMAL
NITRITE URINE: NEGATIVE
PH URINE: 7
PROC NAME: NORMAL
PROT UR-MCNC: 39 MG/DL
PROTEIN URINE: ABNORMAL
RED BLOOD CELLS URINE: 5 /HPF
SPECIFIC GRAVITY URINE: 1.01
SQUAMOUS EPITHELIAL CELLS: 1 /HPF
UROBILINOGEN URINE: NORMAL
WHITE BLOOD CELLS URINE: 1 /HPF

## 2022-05-10 ENCOUNTER — OUTPATIENT (OUTPATIENT)
Dept: OUTPATIENT SERVICES | Facility: HOSPITAL | Age: 77
LOS: 1 days | End: 2022-05-10
Payer: MEDICARE

## 2022-05-10 VITALS
TEMPERATURE: 98 F | HEIGHT: 67 IN | RESPIRATION RATE: 20 BRPM | OXYGEN SATURATION: 96 % | WEIGHT: 172.4 LBS | DIASTOLIC BLOOD PRESSURE: 80 MMHG | HEART RATE: 64 BPM | SYSTOLIC BLOOD PRESSURE: 140 MMHG

## 2022-05-10 DIAGNOSIS — Z96.0 PRESENCE OF UROGENITAL IMPLANTS: Chronic | ICD-10-CM

## 2022-05-10 DIAGNOSIS — H26.9 UNSPECIFIED CATARACT: Chronic | ICD-10-CM

## 2022-05-10 DIAGNOSIS — Z01.818 ENCOUNTER FOR OTHER PREPROCEDURAL EXAMINATION: ICD-10-CM

## 2022-05-10 DIAGNOSIS — N13.5 CROSSING VESSEL AND STRICTURE OF URETER WITHOUT HYDRONEPHROSIS: ICD-10-CM

## 2022-05-10 DIAGNOSIS — Z29.9 ENCOUNTER FOR PROPHYLACTIC MEASURES, UNSPECIFIED: ICD-10-CM

## 2022-05-10 LAB
A1C WITH ESTIMATED AVERAGE GLUCOSE RESULT: 5 % — SIGNIFICANT CHANGE UP (ref 4–5.6)
ALBUMIN SERPL ELPH-MCNC: 3.8 G/DL — SIGNIFICANT CHANGE UP (ref 3.3–5.2)
ALP SERPL-CCNC: 74 U/L — SIGNIFICANT CHANGE UP (ref 40–120)
ALT FLD-CCNC: 14 U/L — SIGNIFICANT CHANGE UP
ANION GAP SERPL CALC-SCNC: 9 MMOL/L — SIGNIFICANT CHANGE UP (ref 5–17)
APPEARANCE UR: CLEAR — SIGNIFICANT CHANGE UP
APTT BLD: 31.1 SEC — SIGNIFICANT CHANGE UP (ref 27.5–35.5)
AST SERPL-CCNC: 18 U/L — SIGNIFICANT CHANGE UP
BASOPHILS # BLD AUTO: 0.03 K/UL — SIGNIFICANT CHANGE UP (ref 0–0.2)
BASOPHILS NFR BLD AUTO: 0.8 % — SIGNIFICANT CHANGE UP (ref 0–2)
BILIRUB SERPL-MCNC: 0.3 MG/DL — LOW (ref 0.4–2)
BILIRUB UR-MCNC: NEGATIVE — SIGNIFICANT CHANGE UP
BLD GP AB SCN SERPL QL: SIGNIFICANT CHANGE UP
BUN SERPL-MCNC: 27.5 MG/DL — HIGH (ref 8–20)
CALCIUM SERPL-MCNC: 9.7 MG/DL — SIGNIFICANT CHANGE UP (ref 8.6–10.2)
CHLORIDE SERPL-SCNC: 110 MMOL/L — HIGH (ref 98–107)
CO2 SERPL-SCNC: 24 MMOL/L — SIGNIFICANT CHANGE UP (ref 22–29)
COLOR SPEC: YELLOW — SIGNIFICANT CHANGE UP
CREAT SERPL-MCNC: 2.01 MG/DL — HIGH (ref 0.5–1.3)
DIFF PNL FLD: ABNORMAL
EGFR: 34 ML/MIN/1.73M2 — LOW
EOSINOPHIL # BLD AUTO: 0.14 K/UL — SIGNIFICANT CHANGE UP (ref 0–0.5)
EOSINOPHIL NFR BLD AUTO: 3.7 % — SIGNIFICANT CHANGE UP (ref 0–6)
EPI CELLS # UR: SIGNIFICANT CHANGE UP
ESTIMATED AVERAGE GLUCOSE: 97 MG/DL — SIGNIFICANT CHANGE UP (ref 68–114)
GLUCOSE SERPL-MCNC: 115 MG/DL — HIGH (ref 70–99)
GLUCOSE UR QL: NEGATIVE MG/DL — SIGNIFICANT CHANGE UP
HCT VFR BLD CALC: 35.5 % — LOW (ref 39–50)
HGB BLD-MCNC: 11.6 G/DL — LOW (ref 13–17)
IMM GRANULOCYTES NFR BLD AUTO: 0 % — SIGNIFICANT CHANGE UP (ref 0–1.5)
INR BLD: 1.2 RATIO — HIGH (ref 0.88–1.16)
KETONES UR-MCNC: NEGATIVE — SIGNIFICANT CHANGE UP
LEUKOCYTE ESTERASE UR-ACNC: ABNORMAL
LYMPHOCYTES # BLD AUTO: 1.16 K/UL — SIGNIFICANT CHANGE UP (ref 1–3.3)
LYMPHOCYTES # BLD AUTO: 30.4 % — SIGNIFICANT CHANGE UP (ref 13–44)
MCHC RBC-ENTMCNC: 28.4 PG — SIGNIFICANT CHANGE UP (ref 27–34)
MCHC RBC-ENTMCNC: 32.7 GM/DL — SIGNIFICANT CHANGE UP (ref 32–36)
MCV RBC AUTO: 87 FL — SIGNIFICANT CHANGE UP (ref 80–100)
MONOCYTES # BLD AUTO: 0.43 K/UL — SIGNIFICANT CHANGE UP (ref 0–0.9)
MONOCYTES NFR BLD AUTO: 11.3 % — SIGNIFICANT CHANGE UP (ref 2–14)
NEUTROPHILS # BLD AUTO: 2.05 K/UL — SIGNIFICANT CHANGE UP (ref 1.8–7.4)
NEUTROPHILS NFR BLD AUTO: 53.8 % — SIGNIFICANT CHANGE UP (ref 43–77)
NITRITE UR-MCNC: NEGATIVE — SIGNIFICANT CHANGE UP
PH UR: 5 — SIGNIFICANT CHANGE UP (ref 5–8)
PLATELET # BLD AUTO: 211 K/UL — SIGNIFICANT CHANGE UP (ref 150–400)
POTASSIUM SERPL-MCNC: 4.4 MMOL/L — SIGNIFICANT CHANGE UP (ref 3.5–5.3)
POTASSIUM SERPL-SCNC: 4.4 MMOL/L — SIGNIFICANT CHANGE UP (ref 3.5–5.3)
PROT SERPL-MCNC: 6.5 G/DL — LOW (ref 6.6–8.7)
PROT UR-MCNC: 30 MG/DL
PROTHROM AB SERPL-ACNC: 14 SEC — HIGH (ref 10.5–13.4)
RBC # BLD: 4.08 M/UL — LOW (ref 4.2–5.8)
RBC # FLD: 14.7 % — HIGH (ref 10.3–14.5)
RBC CASTS # UR COMP ASSIST: ABNORMAL /HPF (ref 0–4)
SODIUM SERPL-SCNC: 143 MMOL/L — SIGNIFICANT CHANGE UP (ref 135–145)
SP GR SPEC: 1.01 — SIGNIFICANT CHANGE UP (ref 1.01–1.02)
UROBILINOGEN FLD QL: NEGATIVE MG/DL — SIGNIFICANT CHANGE UP
WBC # BLD: 3.81 K/UL — SIGNIFICANT CHANGE UP (ref 3.8–10.5)
WBC # FLD AUTO: 3.81 K/UL — SIGNIFICANT CHANGE UP (ref 3.8–10.5)
WBC UR QL: SIGNIFICANT CHANGE UP /HPF (ref 0–5)

## 2022-05-10 PROCEDURE — 87086 URINE CULTURE/COLONY COUNT: CPT

## 2022-05-10 PROCEDURE — 80053 COMPREHEN METABOLIC PANEL: CPT

## 2022-05-10 PROCEDURE — 86850 RBC ANTIBODY SCREEN: CPT

## 2022-05-10 PROCEDURE — 81001 URINALYSIS AUTO W/SCOPE: CPT

## 2022-05-10 PROCEDURE — 86901 BLOOD TYPING SEROLOGIC RH(D): CPT

## 2022-05-10 PROCEDURE — 93010 ELECTROCARDIOGRAM REPORT: CPT

## 2022-05-10 PROCEDURE — 83036 HEMOGLOBIN GLYCOSYLATED A1C: CPT

## 2022-05-10 PROCEDURE — 36415 COLL VENOUS BLD VENIPUNCTURE: CPT

## 2022-05-10 PROCEDURE — 85610 PROTHROMBIN TIME: CPT

## 2022-05-10 PROCEDURE — 85730 THROMBOPLASTIN TIME PARTIAL: CPT

## 2022-05-10 PROCEDURE — G0463: CPT

## 2022-05-10 PROCEDURE — 86900 BLOOD TYPING SEROLOGIC ABO: CPT

## 2022-05-10 PROCEDURE — 93005 ELECTROCARDIOGRAM TRACING: CPT

## 2022-05-10 PROCEDURE — 85025 COMPLETE CBC W/AUTO DIFF WBC: CPT

## 2022-05-10 RX ORDER — CEFAZOLIN SODIUM 1 G
2000 VIAL (EA) INJECTION ONCE
Refills: 0 | Status: COMPLETED | OUTPATIENT
Start: 2022-05-17 | End: 2022-05-17

## 2022-05-10 NOTE — H&P PST ADULT - NSICDXPASTMEDICALHX_GEN_ALL_CORE_FT
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

## 2022-05-10 NOTE — H&P PST ADULT - HISTORY OF PRESENT ILLNESS
75 yo M PMH of HTN, HLD, known advanced metastatic gastric cancer, previous lesion in ureter requiring left ureteral stent, last exchange of stent 1 month ago, patient doing better, still on treatment, will only attempt removal of stent once resolution of disease. Pt reports hematuria last week and stopped his eliquis for two days with improvement, says the color is relatively clear pink, but does not completely clear, was started on abx treatment for risk of UTI. Preop assessment prior to left exchange of ureteral stent w/Dr Cash scheduled for 5/17/2022   75 yo M PMH of HTN, HLD, DVT in trisha GUTIERREZ (on Eliquis), known advanced metastatic gastric cancer, previous lesion in ureter requiring left ureteral stent, last exchange of stent 1 month ago, patient doing better, still on treatment, will only attempt removal of stent once resolution of disease. Pt reports hematuria last week and stopped his eliquis for two days with improvement, says the color is relatively clear pink, but does not completely clear, was started on abx treatment for risk of UTI. Preop assessment prior to left exchange of ureteral stent w/Dr Cash scheduled for 5/17/2022   77 yo M PMH of HTN, HLD, DVT in right LE in 9/2021 (on Eliquis), known advanced metastatic gastric cancer s/p chemo and immunotherapy, previous lesion in ureter requiring left ureteral stent, last exchange of stent in 2/2022. Pt reported hematuria last week and stopped his eliquis for two days with improvement, completed course of abx for risk of UTI. Today patient reports feeling well, he denies fevers, chills, any pain, says urine is now clear yellow, denies dysuria, urgency, frequency. Preop assessment prior to left exchange of ureteral stent w/Dr Cash scheduled for 5/17/2022

## 2022-05-10 NOTE — H&P PST ADULT - ASSESSMENT
75 yo M PMH of HTN, HLD, known advanced metastatic gastric cancer, previous lesion in ureter requiring left ureteral stent, last exchange of stent 1 month ago, patient doing better, still on treatment, will only attempt removal of stent once resolution of disease. Pt reports hematuria last week and stopped his eliquis for two days with improvement, says the color is relatively clear pink, but does not completely clear, was started on abx treatment for risk of UTI. Preop assessment prior to left exchange of ureteral stent w/Dr Cash scheduled for 2022    Pt was educated on preop preparation with written and verbal instructions. Pt was informed to obtain clearances >3 days before surgery. Pt will review medications with PCP. Pt was educated on NSAIDs, multivitamins and herbals that increase the risk of bleeding and need to be stopped 7 days before procedure. Pt was educated on covid testing and covid prevention, i.e. social distancing, handwashing, mask wearing. Pt verbalized understanding of the above.     OPIOID RISK TOOL    AURELIA EACH BOX THAT APPLIES AND ADD TOTALS AT THE END    FAMILY HISTORY OF SUBSTANCE ABUSE                 FEMALE         MALE                                                Alcohol                             [  ]1 pt          [  ]3pts                                               Illegal Durgs                     [  ]2 pts        [  ]3pts                                               Rx Drugs                           [  ]4 pts        [  ]4 pts    PERSONAL HISTORY OF SUBSTANCE ABUSE                                                                                          Alcohol                             [  ]3 pts       [  ]3 pts                                               Illegal Drugs                     [  ]4 pts        [  ]4 pts                                               Rx Drugs                           [  ]5 pts        [  ]5 pts    AGE BETWEEN 16-45 YEARS                                      [  ]1 pt         [  ]1 pt    HISTORY OF PREADOLESCENT   SEXUAL ABUSE                                                             [  ]3 pts        [  ]0pts    PSYCHOLOGICAL DISEASE                     ADD, OCD, Bipolar, Schizophrenia        [  ]2 pts         [  ]2 pts                      Depression                                               [  ]1 pt           [  ]1 pt           SCORING TOTAL   (add numbers and type here)              ( 0 )                                     A score of 3 or lower indicated LOW risk for future opioid abuse  A score of 4 to 7 indicated moderate risk for future opioid abuse  A score of 8 or higher indicates a high risk for opioid abuse    CAPRINI VTE 2.0 SCORE [CLOT updated 2019]    AGE RELATED RISK FACTORS                                                       MOBILITY RELATED FACTORS  [ ] Age 41-60 years                                            (1 Point)                    [ ] Bed rest                                                        (1 Point)  [ ] Age: 61-74 years                                           (2 Points)                  [ ] Plaster cast                                                   (2 Points)  [ x] Age= 75 years                                              (3 Points)                    [ ] Bed bound for more than 72 hours                 (2 Points)    DISEASE RELATED RISK FACTORS                                               GENDER SPECIFIC FACTORS  [ ] Edema in the lower extremities                       (1 Point)              [ ] Pregnancy                                                     (1 Point)  [ ] Varicose veins                                               (1 Point)                     [ ] Post-partum < 6 weeks                                   (1 Point)             [x ] BMI > 25 Kg/m2                                            (1 Point)                     [ ] Hormonal therapy  or oral contraception          (1 Point)                 [ ] Sepsis (in the previous month)                        (1 Point)               [ ] History of pregnancy complications                 (1 point)  [ ] Pneumonia or serious lung disease                                               [ ] Unexplained or recurrent                     (1 Point)           (in the previous month)                               (1 Point)  [ ] Abnormal pulmonary function test                     (1 Point)                 SURGERY RELATED RISK FACTORS  [ ] Acute myocardial infarction                              (1 Point)               [ ]  Section                                             (1 Point)  [ ] Congestive heart failure (in the previous month)  (1 Point)      [ ] Minor surgery                                                  (1 Point)   [ ] Inflammatory bowel disease                             (1 Point)               [ ] Arthroscopic surgery                                        (2 Points)  [ ] Central venous access                                      (2 Points)                [ x] General surgery lasting more than 45 minutes (2 points)  [x ] Malignancy- Present or previous                   (2 Points)                [ ] Elective arthroplasty                                         (5 points)    [ ] Stroke (in the previous month)                          (5 Points)                                                                                                                                                           HEMATOLOGY RELATED FACTORS                                                 TRAUMA RELATED RISK FACTORS  [ ] Prior episodes of VTE                                     (3 Points)                [ ] Fracture of the hip, pelvis, or leg                       (5 Points)  [ ] Positive family history for VTE                         (3 Points)             [ ] Acute spinal cord injury (in the previous month)  (5 Points)  [ ] Prothrombin 99215 A                                     (3 Points)               [ ] Paralysis  (less than 1 month)                             (5 Points)  [ ] Factor V Leiden                                             (3 Points)                  [ ] Multiple Trauma within 1 month                        (5 Points)  [ ] Lupus anticoagulants                                     (3 Points)                                                           [ ] Anticardiolipin antibodies                               (3 Points)                                                       [ ] High homocysteine in the blood                      (3 Points)                                             [ ] Other congenital or acquired thrombophilia      (3 Points)                                                [ ] Heparin induced thrombocytopenia                  (3 Points)                                     Total Score [     8     ] 77 yo M PMH of HTN, HLD, DVT in right LE in 2021 (on Eliquis), known advanced metastatic gastric cancer s/p chemo and immunotherapy, previous lesion in ureter requiring left ureteral stent, last exchange of stent in 2022. Pt reported hematuria last week and stopped his eliquis for two days with improvement, completed course of abx for risk of UTI. Today patient reports feeling well, he denies fevers, chills, any pain, says urine is now clear yellow, denies dysuria, urgency, frequency. Preop assessment prior to left exchange of ureteral stent w/Dr Cash scheduled for 2022    Pt was educated on preop preparation with written and verbal instructions. Pt was informed to obtain clearances >3 days before surgery. Pt will review medications with PCP. Pt was educated on NSAIDs, multivitamins and herbals that increase the risk of bleeding and need to be stopped 7 days before procedure. Pt was educated on covid testing and covid prevention, i.e. social distancing, handwashing, mask wearing. Pt verbalized understanding of the above.     OPIOID RISK TOOL    AURELIA EACH BOX THAT APPLIES AND ADD TOTALS AT THE END    FAMILY HISTORY OF SUBSTANCE ABUSE                 FEMALE         MALE                                                Alcohol                             [  ]1 pt          [  ]3pts                                               Illegal Durgs                     [  ]2 pts        [  ]3pts                                               Rx Drugs                           [  ]4 pts        [  ]4 pts    PERSONAL HISTORY OF SUBSTANCE ABUSE                                                                                          Alcohol                             [  ]3 pts       [  ]3 pts                                               Illegal Drugs                     [  ]4 pts        [  ]4 pts                                               Rx Drugs                           [  ]5 pts        [  ]5 pts    AGE BETWEEN 16-45 YEARS                                      [  ]1 pt         [  ]1 pt    HISTORY OF PREADOLESCENT   SEXUAL ABUSE                                                             [  ]3 pts        [  ]0pts    PSYCHOLOGICAL DISEASE                     ADD, OCD, Bipolar, Schizophrenia        [  ]2 pts         [  ]2 pts                      Depression                                               [  ]1 pt           [  ]1 pt           SCORING TOTAL   (add numbers and type here)              ( 0 )                                     A score of 3 or lower indicated LOW risk for future opioid abuse  A score of 4 to 7 indicated moderate risk for future opioid abuse  A score of 8 or higher indicates a high risk for opioid abuse    CAPRINI VTE 2.0 SCORE [CLOT updated 2019]    AGE RELATED RISK FACTORS                                                       MOBILITY RELATED FACTORS  [ ] Age 41-60 years                                            (1 Point)                    [ ] Bed rest                                                        (1 Point)  [ ] Age: 61-74 years                                           (2 Points)                  [ ] Plaster cast                                                   (2 Points)  [ x] Age= 75 years                                              (3 Points)                    [ ] Bed bound for more than 72 hours                 (2 Points)    DISEASE RELATED RISK FACTORS                                               GENDER SPECIFIC FACTORS  [ ] Edema in the lower extremities                       (1 Point)              [ ] Pregnancy                                                     (1 Point)  [ ] Varicose veins                                               (1 Point)                     [ ] Post-partum < 6 weeks                                   (1 Point)             [x ] BMI > 25 Kg/m2                                            (1 Point)                     [ ] Hormonal therapy  or oral contraception          (1 Point)                 [ ] Sepsis (in the previous month)                        (1 Point)               [ ] History of pregnancy complications                 (1 point)  [ ] Pneumonia or serious lung disease                                               [ ] Unexplained or recurrent                     (1 Point)           (in the previous month)                               (1 Point)  [ ] Abnormal pulmonary function test                     (1 Point)                 SURGERY RELATED RISK FACTORS  [ ] Acute myocardial infarction                              (1 Point)               [ ]  Section                                             (1 Point)  [ ] Congestive heart failure (in the previous month)  (1 Point)      [ ] Minor surgery                                                  (1 Point)   [ ] Inflammatory bowel disease                             (1 Point)               [ ] Arthroscopic surgery                                        (2 Points)  [ ] Central venous access                                      (2 Points)                [ x] General surgery lasting more than 45 minutes (2 points)  [x ] Malignancy- Present or previous                   (2 Points)                [ ] Elective arthroplasty                                         (5 points)    [ ] Stroke (in the previous month)                          (5 Points)                                                                                                                                                           HEMATOLOGY RELATED FACTORS                                                 TRAUMA RELATED RISK FACTORS  [ ] Prior episodes of VTE                                     (3 Points)                [ ] Fracture of the hip, pelvis, or leg                       (5 Points)  [ ] Positive family history for VTE                         (3 Points)             [ ] Acute spinal cord injury (in the previous month)  (5 Points)  [ ] Prothrombin 97826 A                                     (3 Points)               [ ] Paralysis  (less than 1 month)                             (5 Points)  [ ] Factor V Leiden                                             (3 Points)                  [ ] Multiple Trauma within 1 month                        (5 Points)  [ ] Lupus anticoagulants                                     (3 Points)                                                           [ ] Anticardiolipin antibodies                               (3 Points)                                                       [ ] High homocysteine in the blood                      (3 Points)                                             [ ] Other congenital or acquired thrombophilia      (3 Points)                                                [ ] Heparin induced thrombocytopenia                  (3 Points)                                     Total Score [     8     ]

## 2022-05-10 NOTE — H&P PST ADULT - PROBLEM SELECTOR PLAN 1
preop assessment, medical and cardiac clearance pending, left exchange of ureteral stent w/Dr Cash scheduled for 5/17/2022

## 2022-05-10 NOTE — H&P PST ADULT - NSSUBSTANCEUSE_GEN_ALL_CORE_SD
lactation availability./multiparous mom
caffeine

## 2022-05-10 NOTE — H&P PST ADULT - NSICDXPASTSURGICALHX_GEN_ALL_CORE_FT
PAST SURGICAL HISTORY:  Cataract b/l extraction     PAST SURGICAL HISTORY:  Cataract b/l extraction    S/P ureteral stent placement

## 2022-05-11 ENCOUNTER — NON-APPOINTMENT (OUTPATIENT)
Age: 77
End: 2022-05-11

## 2022-05-11 LAB
CULTURE RESULTS: NO GROWTH — SIGNIFICANT CHANGE UP
SPECIMEN SOURCE: SIGNIFICANT CHANGE UP

## 2022-05-11 RX ORDER — AMLODIPINE BESYLATE 2.5 MG/1
1 TABLET ORAL
Qty: 0 | Refills: 0 | DISCHARGE

## 2022-05-12 ENCOUNTER — LABORATORY RESULT (OUTPATIENT)
Age: 77
End: 2022-05-12

## 2022-05-16 ENCOUNTER — TRANSCRIPTION ENCOUNTER (OUTPATIENT)
Age: 77
End: 2022-05-16

## 2022-05-17 ENCOUNTER — APPOINTMENT (OUTPATIENT)
Dept: UROLOGY | Facility: HOSPITAL | Age: 77
End: 2022-05-17

## 2022-05-17 ENCOUNTER — TRANSCRIPTION ENCOUNTER (OUTPATIENT)
Age: 77
End: 2022-05-17

## 2022-05-17 ENCOUNTER — OUTPATIENT (OUTPATIENT)
Dept: OUTPATIENT SERVICES | Facility: HOSPITAL | Age: 77
LOS: 1 days | End: 2022-05-17
Payer: MEDICARE

## 2022-05-17 VITALS
TEMPERATURE: 97 F | HEART RATE: 62 BPM | SYSTOLIC BLOOD PRESSURE: 163 MMHG | DIASTOLIC BLOOD PRESSURE: 85 MMHG | RESPIRATION RATE: 16 BRPM | OXYGEN SATURATION: 99 %

## 2022-05-17 VITALS
RESPIRATION RATE: 16 BRPM | HEIGHT: 67 IN | SYSTOLIC BLOOD PRESSURE: 142 MMHG | TEMPERATURE: 99 F | HEART RATE: 70 BPM | OXYGEN SATURATION: 100 % | WEIGHT: 172.4 LBS | DIASTOLIC BLOOD PRESSURE: 69 MMHG

## 2022-05-17 DIAGNOSIS — H26.9 UNSPECIFIED CATARACT: Chronic | ICD-10-CM

## 2022-05-17 DIAGNOSIS — N13.5 CROSSING VESSEL AND STRICTURE OF URETER WITHOUT HYDRONEPHROSIS: ICD-10-CM

## 2022-05-17 DIAGNOSIS — Z96.0 PRESENCE OF UROGENITAL IMPLANTS: Chronic | ICD-10-CM

## 2022-05-17 PROCEDURE — C2617: CPT

## 2022-05-17 PROCEDURE — 52332 CYSTOSCOPY AND TREATMENT: CPT | Mod: LT

## 2022-05-17 PROCEDURE — 52332 CYSTOSCOPY AND TREATMENT: CPT

## 2022-05-17 PROCEDURE — C1769: CPT

## 2022-05-17 PROCEDURE — 76000 FLUOROSCOPY <1 HR PHYS/QHP: CPT

## 2022-05-17 DEVICE — CATH URET AXCESS 6FR 70CM: Type: IMPLANTABLE DEVICE | Status: FUNCTIONAL

## 2022-05-17 DEVICE — GWIRE SENS NIT 0.035INX150CM: Type: IMPLANTABLE DEVICE | Status: FUNCTIONAL

## 2022-05-17 DEVICE — WIRE SENSORFLX STR .035 X 150CM: Type: IMPLANTABLE DEVICE | Status: FUNCTIONAL

## 2022-05-17 DEVICE — BASKET ZEROTIP NITINOL 1.9FR 120CM X 12MM 4 WIRES: Type: IMPLANTABLE DEVICE | Status: FUNCTIONAL

## 2022-05-17 DEVICE — LASER FIBER FLEXIVA 365: Type: IMPLANTABLE DEVICE | Status: FUNCTIONAL

## 2022-05-17 DEVICE — LASER FIBER FLEXIVA 242 HI POWER: Type: IMPLANTABLE DEVICE | Status: FUNCTIONAL

## 2022-05-17 DEVICE — STENT URET PERCFLX PLUS 7X26 W/O GDWIRE: Type: IMPLANTABLE DEVICE | Status: FUNCTIONAL

## 2022-05-17 RX ORDER — ACETAMINOPHEN 500 MG
975 TABLET ORAL ONCE
Refills: 0 | Status: COMPLETED | OUTPATIENT
Start: 2022-05-17 | End: 2022-05-17

## 2022-05-17 RX ADMIN — Medication 100 MILLIGRAM(S): at 15:30

## 2022-05-17 RX ADMIN — Medication 975 MILLIGRAM(S): at 13:30

## 2022-05-17 NOTE — ASU DISCHARGE PLAN (ADULT/PEDIATRIC) - CALL YOUR DOCTOR IF YOU HAVE ANY OF THE FOLLOWING:
Bleeding that does not stop/Pain not relieved by Medications/Fever greater than (need to indicate Fahrenheit or Celsius)/Wound/Surgical Site with redness, or foul smelling discharge or pus/Nausea and vomiting that does not stop Bleeding that does not stop/Pain not relieved by Medications/Fever greater than (need to indicate Fahrenheit or Celsius)/Wound/Surgical Site with redness, or foul smelling discharge or pus/Nausea and vomiting that does not stop/Unable to urinate

## 2022-05-17 NOTE — ASU DISCHARGE PLAN (ADULT/PEDIATRIC) - COMMENTS
You should follow up with Dr. Cash in his office in about 6 months.  If arrangements were not made at you at discharge; you can call the office in one to two weeks from discharge to confirm follow up in 6 months from surgery.

## 2022-05-17 NOTE — ASU DISCHARGE PLAN (ADULT/PEDIATRIC) - ASU DC SPECIAL INSTRUCTIONSFT
It is common to have blood in the urine after your procedure. It may be pink or red.  If you have significant amount of clots in urine, difficulty urinating or unable to urinate inform your doctor. Drink plenty of liquids.    Call office for appointment followup with Dr Cash It is common to have blood in the urine after your procedure. It may be pink or red.  If you have significant amount of clots in urine, difficulty urinating or unable to urinate inform your doctor. Drink plenty of liquids.    will schedule stent exchange in 6 months

## 2022-05-17 NOTE — ASU DISCHARGE PLAN (ADULT/PEDIATRIC) - NS MD DC FALL RISK RISK
For information on Fall & Injury Prevention, visit: [email or web address] OR  [email or web address] OR  https://www.cdc.gov/steadi/patient.html

## 2022-05-17 NOTE — ASU DISCHARGE PLAN (ADULT/PEDIATRIC) - CARE PROVIDER_API CALL
Cisco Cash)  Urology  [address], [place] [address]  Phone: [phone]  Fax: [phone]  Follow Up Time:

## 2022-05-18 PROBLEM — H40.9 UNSPECIFIED GLAUCOMA: Chronic | Status: ACTIVE | Noted: 2021-09-24

## 2022-05-18 PROBLEM — I82.409 ACUTE EMBOLISM AND THROMBOSIS OF UNSPECIFIED DEEP VEINS OF UNSPECIFIED LOWER EXTREMITY: Chronic | Status: ACTIVE | Noted: 2021-09-24

## 2022-05-18 PROBLEM — C16.9 MALIGNANT NEOPLASM OF STOMACH, UNSPECIFIED: Chronic | Status: ACTIVE | Noted: 2021-05-20

## 2022-05-24 ENCOUNTER — APPOINTMENT (OUTPATIENT)
Dept: UROLOGY | Facility: CLINIC | Age: 77
End: 2022-05-24

## 2022-06-15 ENCOUNTER — APPOINTMENT (OUTPATIENT)
Dept: UROLOGY | Facility: CLINIC | Age: 77
End: 2022-06-15
Payer: MEDICARE

## 2022-06-15 PROCEDURE — 99213 OFFICE O/P EST LOW 20 MIN: CPT

## 2022-06-16 NOTE — HISTORY OF PRESENT ILLNESS
[FreeTextEntry1] : 75 yo M\par see previous notes\par metastatic gastric cancer\par lesion in the ureter causing hydro - with ureteral stent\par still receiving therapy and planned for more treatment\par \par last stent exchange was in an interval of 5 months and had no encrustation\par patients oncologist prefers stent to stay until end of treatment\par no need to perform ureteroscopy to check for ureteral lesions or obstruction now\par \par will plan next stent exchange towards 11-12/2022\par patient understands and agrees

## 2022-06-30 ENCOUNTER — RX RENEWAL (OUTPATIENT)
Age: 77
End: 2022-06-30

## 2022-06-30 RX ORDER — AMLODIPINE BESYLATE 5 MG/1
5 TABLET ORAL DAILY
Qty: 90 | Refills: 0 | Status: ACTIVE | COMMUNITY
Start: 2021-11-30 | End: 1900-01-01

## 2022-10-25 NOTE — BRIEF OPERATIVE NOTE - PRIMARY SURGEON
jana A-T Advancement Flap Text: The defect edges were debeveled with a #15 scalpel blade.  Given the location of the defect, shape of the defect and the proximity to free margins an A-T advancement flap was deemed most appropriate.  Using a sterile surgical marker, an appropriate advancement flap was drawn incorporating the defect and placing the expected incisions within the relaxed skin tension lines where possible.    The area thus outlined was incised deep to adipose tissue with a #15 scalpel blade.  The skin margins were undermined to an appropriate distance in all directions utilizing iris scissors.

## 2022-11-22 RX ORDER — AMOXICILLIN AND CLAVULANATE POTASSIUM 875; 125 MG/1; MG/1
875-125 TABLET, COATED ORAL
Qty: 14 | Refills: 0 | Status: DISCONTINUED | COMMUNITY
Start: 2022-03-02 | End: 2022-11-22

## 2022-11-22 RX ORDER — AMOXICILLIN AND CLAVULANATE POTASSIUM 875; 125 MG/1; MG/1
875-125 TABLET, COATED ORAL
Qty: 14 | Refills: 0 | Status: DISCONTINUED | COMMUNITY
Start: 2022-05-11 | End: 2022-11-22

## 2022-11-22 RX ORDER — AMOXICILLIN AND CLAVULANATE POTASSIUM 875; 125 MG/1; MG/1
875-125 TABLET, COATED ORAL
Qty: 10 | Refills: 0 | Status: DISCONTINUED | COMMUNITY
Start: 2022-01-26 | End: 2022-11-22

## 2022-11-23 ENCOUNTER — NON-APPOINTMENT (OUTPATIENT)
Age: 77
End: 2022-11-23

## 2022-12-01 NOTE — ASU PREOP CHECKLIST - HAIR REMOVAL
hair removal not indicated Staged Advancement Flap Text: The defect edges were debeveled with a #15 scalpel blade.  Given the location of the defect, shape of the defect and the proximity to free margins a staged advancement flap was deemed most appropriate.  Using a sterile surgical marker, an appropriate advancement flap was drawn incorporating the defect and placing the expected incisions within the relaxed skin tension lines where possible. The area thus outlined was incised deep to adipose tissue with a #15 scalpel blade.  The skin margins were undermined to an appropriate distance in all directions utilizing iris scissors.

## 2022-12-07 ENCOUNTER — OUTPATIENT (OUTPATIENT)
Dept: OUTPATIENT SERVICES | Facility: HOSPITAL | Age: 77
LOS: 1 days | End: 2022-12-07
Payer: MEDICARE

## 2022-12-07 VITALS
DIASTOLIC BLOOD PRESSURE: 70 MMHG | HEART RATE: 70 BPM | TEMPERATURE: 97 F | WEIGHT: 171.96 LBS | SYSTOLIC BLOOD PRESSURE: 118 MMHG | HEIGHT: 67 IN | OXYGEN SATURATION: 100 % | RESPIRATION RATE: 16 BRPM

## 2022-12-07 DIAGNOSIS — H26.9 UNSPECIFIED CATARACT: Chronic | ICD-10-CM

## 2022-12-07 DIAGNOSIS — Z87.448 PERSONAL HISTORY OF OTHER DISEASES OF URINARY SYSTEM: ICD-10-CM

## 2022-12-07 DIAGNOSIS — Z01.818 ENCOUNTER FOR OTHER PREPROCEDURAL EXAMINATION: ICD-10-CM

## 2022-12-07 DIAGNOSIS — N13.5 CROSSING VESSEL AND STRICTURE OF URETER WITHOUT HYDRONEPHROSIS: ICD-10-CM

## 2022-12-07 DIAGNOSIS — R94.31 ABNORMAL ELECTROCARDIOGRAM [ECG] [EKG]: ICD-10-CM

## 2022-12-07 DIAGNOSIS — Z95.828 PRESENCE OF OTHER VASCULAR IMPLANTS AND GRAFTS: Chronic | ICD-10-CM

## 2022-12-07 DIAGNOSIS — Z29.9 ENCOUNTER FOR PROPHYLACTIC MEASURES, UNSPECIFIED: ICD-10-CM

## 2022-12-07 DIAGNOSIS — I10 ESSENTIAL (PRIMARY) HYPERTENSION: ICD-10-CM

## 2022-12-07 DIAGNOSIS — Z96.0 PRESENCE OF UROGENITAL IMPLANTS: Chronic | ICD-10-CM

## 2022-12-07 DIAGNOSIS — U07.1 COVID-19: ICD-10-CM

## 2022-12-07 LAB
ANION GAP SERPL CALC-SCNC: 9 MMOL/L — SIGNIFICANT CHANGE UP (ref 5–17)
APPEARANCE UR: CLEAR — SIGNIFICANT CHANGE UP
APTT BLD: 30.8 SEC — SIGNIFICANT CHANGE UP (ref 27.5–35.5)
BASOPHILS # BLD AUTO: 0.03 K/UL — SIGNIFICANT CHANGE UP (ref 0–0.2)
BASOPHILS NFR BLD AUTO: 0.7 % — SIGNIFICANT CHANGE UP (ref 0–2)
BILIRUB UR-MCNC: NEGATIVE — SIGNIFICANT CHANGE UP
BUN SERPL-MCNC: 40.1 MG/DL — HIGH (ref 8–20)
CALCIUM SERPL-MCNC: 10 MG/DL — SIGNIFICANT CHANGE UP (ref 8.4–10.5)
CHLORIDE SERPL-SCNC: 111 MMOL/L — HIGH (ref 96–108)
CO2 SERPL-SCNC: 26 MMOL/L — SIGNIFICANT CHANGE UP (ref 22–29)
COLOR SPEC: YELLOW — SIGNIFICANT CHANGE UP
CREAT SERPL-MCNC: 2.63 MG/DL — HIGH (ref 0.5–1.3)
DIFF PNL FLD: NEGATIVE — SIGNIFICANT CHANGE UP
EGFR: 24 ML/MIN/1.73M2 — LOW
EOSINOPHIL # BLD AUTO: 0.29 K/UL — SIGNIFICANT CHANGE UP (ref 0–0.5)
EOSINOPHIL NFR BLD AUTO: 6.4 % — HIGH (ref 0–6)
GLUCOSE SERPL-MCNC: 96 MG/DL — SIGNIFICANT CHANGE UP (ref 70–99)
GLUCOSE UR QL: NEGATIVE MG/DL — SIGNIFICANT CHANGE UP
HCT VFR BLD CALC: 34.8 % — LOW (ref 39–50)
HGB BLD-MCNC: 11.6 G/DL — LOW (ref 13–17)
IMM GRANULOCYTES NFR BLD AUTO: 0.2 % — SIGNIFICANT CHANGE UP (ref 0–0.9)
INR BLD: 1.1 RATIO — SIGNIFICANT CHANGE UP (ref 0.88–1.16)
KETONES UR-MCNC: NEGATIVE — SIGNIFICANT CHANGE UP
LEUKOCYTE ESTERASE UR-ACNC: NEGATIVE — SIGNIFICANT CHANGE UP
LYMPHOCYTES # BLD AUTO: 1.86 K/UL — SIGNIFICANT CHANGE UP (ref 1–3.3)
LYMPHOCYTES # BLD AUTO: 40.9 % — SIGNIFICANT CHANGE UP (ref 13–44)
MCHC RBC-ENTMCNC: 28.6 PG — SIGNIFICANT CHANGE UP (ref 27–34)
MCHC RBC-ENTMCNC: 33.3 GM/DL — SIGNIFICANT CHANGE UP (ref 32–36)
MCV RBC AUTO: 85.9 FL — SIGNIFICANT CHANGE UP (ref 80–100)
MONOCYTES # BLD AUTO: 0.49 K/UL — SIGNIFICANT CHANGE UP (ref 0–0.9)
MONOCYTES NFR BLD AUTO: 10.8 % — SIGNIFICANT CHANGE UP (ref 2–14)
NEUTROPHILS # BLD AUTO: 1.87 K/UL — SIGNIFICANT CHANGE UP (ref 1.8–7.4)
NEUTROPHILS NFR BLD AUTO: 41 % — LOW (ref 43–77)
NITRITE UR-MCNC: NEGATIVE — SIGNIFICANT CHANGE UP
PH UR: 6 — SIGNIFICANT CHANGE UP (ref 5–8)
PLATELET # BLD AUTO: 232 K/UL — SIGNIFICANT CHANGE UP (ref 150–400)
POTASSIUM SERPL-MCNC: 4.2 MMOL/L — SIGNIFICANT CHANGE UP (ref 3.5–5.3)
POTASSIUM SERPL-SCNC: 4.2 MMOL/L — SIGNIFICANT CHANGE UP (ref 3.5–5.3)
PROT UR-MCNC: NEGATIVE — SIGNIFICANT CHANGE UP
PROTHROM AB SERPL-ACNC: 12.8 SEC — SIGNIFICANT CHANGE UP (ref 10.5–13.4)
RBC # BLD: 4.05 M/UL — LOW (ref 4.2–5.8)
RBC # FLD: 15.9 % — HIGH (ref 10.3–14.5)
SODIUM SERPL-SCNC: 146 MMOL/L — HIGH (ref 135–145)
SP GR SPEC: 1.01 — SIGNIFICANT CHANGE UP (ref 1.01–1.02)
UROBILINOGEN FLD QL: NEGATIVE MG/DL — SIGNIFICANT CHANGE UP
WBC # BLD: 4.55 K/UL — SIGNIFICANT CHANGE UP (ref 3.8–10.5)
WBC # FLD AUTO: 4.55 K/UL — SIGNIFICANT CHANGE UP (ref 3.8–10.5)

## 2022-12-07 PROCEDURE — G0463: CPT

## 2022-12-07 PROCEDURE — 85610 PROTHROMBIN TIME: CPT

## 2022-12-07 PROCEDURE — 36415 COLL VENOUS BLD VENIPUNCTURE: CPT

## 2022-12-07 PROCEDURE — 93005 ELECTROCARDIOGRAM TRACING: CPT

## 2022-12-07 PROCEDURE — 81003 URINALYSIS AUTO W/O SCOPE: CPT

## 2022-12-07 PROCEDURE — 85025 COMPLETE CBC W/AUTO DIFF WBC: CPT

## 2022-12-07 PROCEDURE — 93010 ELECTROCARDIOGRAM REPORT: CPT

## 2022-12-07 PROCEDURE — 87086 URINE CULTURE/COLONY COUNT: CPT

## 2022-12-07 PROCEDURE — 80048 BASIC METABOLIC PNL TOTAL CA: CPT

## 2022-12-07 PROCEDURE — 85730 THROMBOPLASTIN TIME PARTIAL: CPT

## 2022-12-07 RX ORDER — CEFAZOLIN SODIUM 1 G
2000 VIAL (EA) INJECTION ONCE
Refills: 0 | Status: COMPLETED | OUTPATIENT
Start: 2022-12-13 | End: 2022-12-13

## 2022-12-07 RX ORDER — GABAPENTIN 400 MG/1
0 CAPSULE ORAL
Qty: 0 | Refills: 0 | DISCHARGE

## 2022-12-07 RX ORDER — SODIUM CHLORIDE 9 MG/ML
3 INJECTION INTRAMUSCULAR; INTRAVENOUS; SUBCUTANEOUS ONCE
Refills: 0 | Status: DISCONTINUED | OUTPATIENT
Start: 2022-12-13 | End: 2022-12-13

## 2022-12-07 NOTE — H&P PST ADULT - NSICDXPASTMEDICALHX_GEN_ALL_CORE_FT
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

## 2022-12-07 NOTE — H&P PST ADULT - NSHP PST DIAGOTHER LIST_GEN_A_CORE
bun 40.1 creat 2.63 faxed to Dr. Tassy k damico np 12/7/22 bun 40.1 creat 2.63 faxed to Dr. Tassy k damico np 12/7/22 email sent to [email or web address] bee  .

## 2022-12-07 NOTE — H&P PST ADULT - PROBLEM SELECTOR PLAN 1
caprini score 12  surgical team assess need for dvt prophylaxis  hx DVT on eliquis to discuss when to stop pre op with cardiology

## 2022-12-07 NOTE — H&P PST ADULT - ADDITIONAL PE
trace swelling pacheco lower legs trace swelling pacheco lower legs  right upper chest port intact ,

## 2022-12-07 NOTE — H&P PST ADULT - GENITOURINARY COMMENTS
left ureteral stent in place hydronephrosis secondary to gastric cancer  stent changed  last in MAy 2022 ureteral stent in place

## 2022-12-07 NOTE — H&P PST ADULT - HISTORY OF PRESENT ILLNESS
77 yr old male with history of htn , DVT from 2021 on eliquis , gastric cancer and cataracts  presents with history of gastric cancer in 2021 , Pt treats with immunotherapy 2/ month with DR. Arellano , pt had hydronephrosis due to metastatic disease , stents placed and changed every few months since then, last stent placement MAy 2022, denies any hematuria or dysuria.

## 2022-12-07 NOTE — H&P PST ADULT - NSICDXPROCEDURE_GEN_ALL_CORE_FT
PROCEDURES:  Cystoscopic replacement of ureteral stent 07-Dec-2022 07:48:55 ureteral stricture D'Amico, Karyn L

## 2022-12-07 NOTE — H&P PST ADULT - ASSESSMENT
77 yr old male with history of htn , DVT from  on eliquis , gastric cancer and cataracts  presents with history of gastric cancer in  , Pt treats with immunotherapy 2/ month with DR. Arellano , pt had hydronephrosis due to metastatic disease , stents placed and changed every few months since then, last stent placement MAy 2022, denies any hematuria or dysuria. PCR to be obtained pre op 3- 5 days pre op   vaccinated x3 . medical and cardiac clearance to be obtained.  MAllampati score II   OPIOID RISK TOOL    AURELIA EACH BOX THAT APPLIES AND ADD TOTALS AT THE END    FAMILY HISTORY OF SUBSTANCE ABUSE                 FEMALE         MALE                                                Alcohol                             [  ]1 pt          [  ]3pts                                               Illegal Durgs                     [  ]2 pts        [  ]3pts                                               Rx Drugs                           [  ]4 pts        [  ]4 pts    PERSONAL HISTORY OF SUBSTANCE ABUSE                                                                                          Alcohol                             [  ]3 pts       [  ]3 pts                                               Illegal Durgs                     [  ]4 pts        [  ]4 pts                                               Rx Drugs                           [  ]5 pts        [  ]5 pts    AGE BETWEEN 16-45 YEARS                                      [  ]1 pt         [  ]1 pt    HISTORY OF PREADOLESCENT   SEXUAL ABUSE                                                             [  ]3 pts        [  ]0pts    PSYCHOLOGICAL DISEASE                     ADD, OCD, Bipolar, Schizophrenia        [  ]2 pts         [  ]2 pts                      Depression                                               [  ]1 pt           [  ]1 pt           SCORING TOTAL   (add numbers and type here)              (0***)                                     A score of 3 or lower indicated LOW risk for future opiod abuse  A score of 4 to 7 indicated moderate risk for future opiod abuse  A score of 8 or higher indicates a high risk for opiod abuse  CAPRINI VTE 2.0 SCORE [CLOT updated 2019]    AGE RELATED RISK FACTORS                                                       MOBILITY RELATED FACTORS  [ ] Age 41-60 years                                            (1 Point)                    [ ] Bed rest                                                        (1 Point)  [ ] Age: 61-74 years                                           (2 Points)                  [ ] Plaster cast                                                   (2 Points)  [X ] Age= 75 years                                              (3 Points)                    [ ] Bed bound for more than 72 hours                 (2 Points)    DISEASE RELATED RISK FACTORS                                               GENDER SPECIFIC FACTORS  [X ] Edema in the lower extremities                       (1 Point)              [ ] Pregnancy                                                     (1 Point)  [ ] Varicose veins                                               (1 Point)                     [ ] Post-partum < 6 weeks                                   (1 Point)             [X ] BMI > 25 Kg/m2                                            (1 Point)                     [ ] Hormonal therapy  or oral contraception          (1 Point)                 [ ] Sepsis (in the previous month)                        (1 Point)               [ ] History of pregnancy complications                 (1 point)  [ ] Pneumonia or serious lung disease                                               [ ] Unexplained or recurrent                     (1 Point)           (in the previous month)                               (1 Point)  [ ] Abnormal pulmonary function test                     (1 Point)                 SURGERY RELATED RISK FACTORS  [ ] Acute myocardial infarction                              (1 Point)               [ ]  Section                                             (1 Point)  [ ] Congestive heart failure (in the previous month)  (1 Point)      [ ] Minor surgery                                                  (1 Point)   [ ] Inflammatory bowel disease                             (1 Point)               [ ] Arthroscopic surgery                                        (2 Points)  [ ] Central venous access                                      (2 Points)                [ X] General surgery lasting more than 45 minutes (2 points)  [X ] Malignancy- Present or previous                   (2 Points)                [ ] Elective arthroplasty                                         (5 points)    [ ] Stroke (in the previous month)                          (5 Points)                                                                                                                                                           HEMATOLOGY RELATED FACTORS                                                 TRAUMA RELATED RISK FACTORS  [ X] Prior episodes of VTE                                     (3 Points)                [ ] Fracture of the hip, pelvis, or leg                       (5 Points)  [ ] Positive family history for VTE                         (3 Points)             [ ] Acute spinal cord injury (in the previous month)  (5 Points)  [ ] Prothrombin 84331 A                                     (3 Points)               [ ] Paralysis  (less than 1 month)                             (5 Points)  [ ] Factor V Leiden                                             (3 Points)                  [ ] Multiple Trauma within 1 month                        (5 Points)  [ ] Lupus anticoagulants                                     (3 Points)                                                           [ ] Anticardiolipin antibodies                               (3 Points)                                                       [ ] High homocysteine in the blood                      (3 Points)                                             [ ] Other congenital or acquired thrombophilia      (3 Points)                                                [ ] Heparin induced thrombocytopenia                  (3 Points)                                     Total Score [     12     ]

## 2022-12-07 NOTE — H&P PST ADULT - NSICDXPASTSURGICALHX_GEN_ALL_CORE_FT
PAST SURGICAL HISTORY:  Cataract b/l extraction    S/P ureteral stent placement      PAST SURGICAL HISTORY:  Cataract b/l extraction    Port-A-Cath in place     S/P ureteral stent placement

## 2022-12-07 NOTE — H&P PST ADULT - PROBLEM SELECTOR PLAN 3
left exchange of ureteral stent on 12/13/22 with DR. RON   medical and cardiac clearances to be obtained

## 2022-12-07 NOTE — H&P PST ADULT - GASTROINTESTINAL COMMENTS
gastric cancer   treated with immunotherapy 2/ month Dr Arellano diagnosed 2021 gastric cancer   treated with immunotherapy 2/ month Dr. Arellano diagnosed 2021

## 2022-12-08 LAB
CULTURE RESULTS: SIGNIFICANT CHANGE UP
SPECIMEN SOURCE: SIGNIFICANT CHANGE UP

## 2022-12-12 ENCOUNTER — TRANSCRIPTION ENCOUNTER (OUTPATIENT)
Age: 77
End: 2022-12-12

## 2022-12-12 NOTE — ASU PATIENT PROFILE, ADULT - NSICDXPASTSURGICALHX_GEN_ALL_CORE_FT
PAST SURGICAL HISTORY:  Cataract b/l extraction    Port-A-Cath in place     S/P ureteral stent placement

## 2022-12-13 ENCOUNTER — APPOINTMENT (OUTPATIENT)
Dept: UROLOGY | Facility: HOSPITAL | Age: 77
End: 2022-12-13

## 2022-12-13 ENCOUNTER — OUTPATIENT (OUTPATIENT)
Dept: INPATIENT UNIT | Facility: HOSPITAL | Age: 77
LOS: 1 days | End: 2022-12-13
Payer: MEDICARE

## 2022-12-13 ENCOUNTER — TRANSCRIPTION ENCOUNTER (OUTPATIENT)
Age: 77
End: 2022-12-13

## 2022-12-13 VITALS
DIASTOLIC BLOOD PRESSURE: 75 MMHG | HEART RATE: 61 BPM | TEMPERATURE: 98 F | RESPIRATION RATE: 15 BRPM | OXYGEN SATURATION: 97 % | SYSTOLIC BLOOD PRESSURE: 155 MMHG

## 2022-12-13 VITALS
HEIGHT: 67 IN | DIASTOLIC BLOOD PRESSURE: 66 MMHG | SYSTOLIC BLOOD PRESSURE: 139 MMHG | TEMPERATURE: 99 F | RESPIRATION RATE: 16 BRPM | OXYGEN SATURATION: 100 % | WEIGHT: 171.96 LBS | HEART RATE: 65 BPM

## 2022-12-13 DIAGNOSIS — Z95.828 PRESENCE OF OTHER VASCULAR IMPLANTS AND GRAFTS: Chronic | ICD-10-CM

## 2022-12-13 DIAGNOSIS — Z96.0 PRESENCE OF UROGENITAL IMPLANTS: Chronic | ICD-10-CM

## 2022-12-13 DIAGNOSIS — H26.9 UNSPECIFIED CATARACT: Chronic | ICD-10-CM

## 2022-12-13 DIAGNOSIS — N13.5 CROSSING VESSEL AND STRICTURE OF URETER WITHOUT HYDRONEPHROSIS: ICD-10-CM

## 2022-12-13 LAB
APTT BLD: 34 SEC — SIGNIFICANT CHANGE UP (ref 27.5–35.5)
INR BLD: 1.08 RATIO — SIGNIFICANT CHANGE UP (ref 0.88–1.16)
PROTHROM AB SERPL-ACNC: 12.5 SEC — SIGNIFICANT CHANGE UP (ref 10.5–13.4)

## 2022-12-13 PROCEDURE — 52332 CYSTOSCOPY AND TREATMENT: CPT | Mod: LT

## 2022-12-13 PROCEDURE — C2617: CPT

## 2022-12-13 PROCEDURE — C1769: CPT

## 2022-12-13 PROCEDURE — 85730 THROMBOPLASTIN TIME PARTIAL: CPT

## 2022-12-13 PROCEDURE — C1758: CPT

## 2022-12-13 PROCEDURE — 85610 PROTHROMBIN TIME: CPT

## 2022-12-13 PROCEDURE — 36415 COLL VENOUS BLD VENIPUNCTURE: CPT

## 2022-12-13 DEVICE — GUIDEWIRE SENSOR NITINOL STRAIGHT .035" X 150CM: Type: IMPLANTABLE DEVICE | Status: FUNCTIONAL

## 2022-12-13 DEVICE — URETERAL CATH OPEN END FLEXI-TIP 5FR .038" X 70CM: Type: IMPLANTABLE DEVICE | Status: FUNCTIONAL

## 2022-12-13 DEVICE — URETERAL STENT PERCUFLEX PLUS 7FR 26CM: Type: IMPLANTABLE DEVICE | Status: FUNCTIONAL

## 2022-12-13 RX ORDER — FENTANYL CITRATE 50 UG/ML
25 INJECTION INTRAVENOUS
Refills: 0 | Status: DISCONTINUED | OUTPATIENT
Start: 2022-12-13 | End: 2022-12-28

## 2022-12-13 RX ORDER — AMLODIPINE BESYLATE 2.5 MG/1
1 TABLET ORAL
Qty: 0 | Refills: 0 | DISCHARGE

## 2022-12-13 RX ORDER — SODIUM CHLORIDE 9 MG/ML
1000 INJECTION, SOLUTION INTRAVENOUS
Refills: 0 | Status: DISCONTINUED | OUTPATIENT
Start: 2022-12-13 | End: 2022-12-13

## 2022-12-13 NOTE — ASU DISCHARGE PLAN (ADULT/PEDIATRIC) - NS MD DC FALL RISK RISK
Pt admitted, awaiting bed. Medicated as ordered. Will continue to monitor. For information on Fall & Injury Prevention, visit: [email or web address] OR  [email or web address] OR  https://www.cdc.gov/steadi/patient.html

## 2022-12-13 NOTE — ASU DISCHARGE PLAN (ADULT/PEDIATRIC) - CARE PROVIDER_API CALL
[employer])  Urology  [address], [place] [address]  Phone: [phone]  Fax: [phone]  Follow Up Time:

## 2023-01-04 NOTE — H&P PST ADULT - BREASTS
Received VM from Serge not examined

## 2023-01-18 ENCOUNTER — APPOINTMENT (OUTPATIENT)
Dept: NEPHROLOGY | Facility: CLINIC | Age: 78
End: 2023-01-18

## 2023-02-15 ENCOUNTER — APPOINTMENT (OUTPATIENT)
Dept: UROLOGY | Facility: CLINIC | Age: 78
End: 2023-02-15

## 2023-02-16 ENCOUNTER — APPOINTMENT (OUTPATIENT)
Dept: NEPHROLOGY | Facility: CLINIC | Age: 78
End: 2023-02-16

## 2023-05-17 ENCOUNTER — OUTPATIENT (OUTPATIENT)
Dept: OUTPATIENT SERVICES | Facility: HOSPITAL | Age: 78
LOS: 1 days | End: 2023-05-17
Payer: MEDICARE

## 2023-05-17 VITALS
SYSTOLIC BLOOD PRESSURE: 160 MMHG | HEART RATE: 67 BPM | RESPIRATION RATE: 18 BRPM | TEMPERATURE: 98 F | HEIGHT: 67 IN | OXYGEN SATURATION: 96 % | DIASTOLIC BLOOD PRESSURE: 82 MMHG | WEIGHT: 170.86 LBS

## 2023-05-17 DIAGNOSIS — H26.9 UNSPECIFIED CATARACT: Chronic | ICD-10-CM

## 2023-05-17 DIAGNOSIS — N13.5 CROSSING VESSEL AND STRICTURE OF URETER WITHOUT HYDRONEPHROSIS: ICD-10-CM

## 2023-05-17 DIAGNOSIS — Z96.0 PRESENCE OF UROGENITAL IMPLANTS: Chronic | ICD-10-CM

## 2023-05-17 DIAGNOSIS — I10 ESSENTIAL (PRIMARY) HYPERTENSION: ICD-10-CM

## 2023-05-17 DIAGNOSIS — Z01.818 ENCOUNTER FOR OTHER PREPROCEDURAL EXAMINATION: ICD-10-CM

## 2023-05-17 DIAGNOSIS — Z95.828 PRESENCE OF OTHER VASCULAR IMPLANTS AND GRAFTS: Chronic | ICD-10-CM

## 2023-05-17 DIAGNOSIS — Z91.89 OTHER SPECIFIED PERSONAL RISK FACTORS, NOT ELSEWHERE CLASSIFIED: ICD-10-CM

## 2023-05-17 DIAGNOSIS — G47.33 OBSTRUCTIVE SLEEP APNEA (ADULT) (PEDIATRIC): ICD-10-CM

## 2023-05-17 LAB
A1C WITH ESTIMATED AVERAGE GLUCOSE RESULT: 5.6 % — SIGNIFICANT CHANGE UP (ref 4–5.6)
ANION GAP SERPL CALC-SCNC: 9 MMOL/L — SIGNIFICANT CHANGE UP (ref 5–17)
APPEARANCE UR: CLEAR — SIGNIFICANT CHANGE UP
APTT BLD: 30.7 SEC — SIGNIFICANT CHANGE UP (ref 27.5–35.5)
BACTERIA # UR AUTO: ABNORMAL
BASOPHILS # BLD AUTO: 0.01 K/UL — SIGNIFICANT CHANGE UP (ref 0–0.2)
BASOPHILS NFR BLD AUTO: 0.2 % — SIGNIFICANT CHANGE UP (ref 0–2)
BILIRUB UR-MCNC: NEGATIVE — SIGNIFICANT CHANGE UP
BUN SERPL-MCNC: 30 MG/DL — HIGH (ref 8–20)
CALCIUM SERPL-MCNC: 9.8 MG/DL — SIGNIFICANT CHANGE UP (ref 8.4–10.5)
CHLORIDE SERPL-SCNC: 107 MMOL/L — SIGNIFICANT CHANGE UP (ref 96–108)
CO2 SERPL-SCNC: 28 MMOL/L — SIGNIFICANT CHANGE UP (ref 22–29)
COLOR SPEC: YELLOW — SIGNIFICANT CHANGE UP
CREAT SERPL-MCNC: 2.09 MG/DL — HIGH (ref 0.5–1.3)
DIFF PNL FLD: ABNORMAL
EGFR: 32 ML/MIN/1.73M2 — LOW
EOSINOPHIL # BLD AUTO: 0.18 K/UL — SIGNIFICANT CHANGE UP (ref 0–0.5)
EOSINOPHIL NFR BLD AUTO: 2.9 % — SIGNIFICANT CHANGE UP (ref 0–6)
EPI CELLS # UR: SIGNIFICANT CHANGE UP
ESTIMATED AVERAGE GLUCOSE: 114 MG/DL — SIGNIFICANT CHANGE UP (ref 68–114)
GLUCOSE SERPL-MCNC: 79 MG/DL — SIGNIFICANT CHANGE UP (ref 70–99)
GLUCOSE UR QL: NEGATIVE MG/DL — SIGNIFICANT CHANGE UP
HCT VFR BLD CALC: 39.2 % — SIGNIFICANT CHANGE UP (ref 39–50)
HGB BLD-MCNC: 13.2 G/DL — SIGNIFICANT CHANGE UP (ref 13–17)
IMM GRANULOCYTES NFR BLD AUTO: 0.2 % — SIGNIFICANT CHANGE UP (ref 0–0.9)
INR BLD: 1.26 RATIO — HIGH (ref 0.88–1.16)
KETONES UR-MCNC: NEGATIVE — SIGNIFICANT CHANGE UP
LEUKOCYTE ESTERASE UR-ACNC: ABNORMAL
LYMPHOCYTES # BLD AUTO: 2.27 K/UL — SIGNIFICANT CHANGE UP (ref 1–3.3)
LYMPHOCYTES # BLD AUTO: 36.4 % — SIGNIFICANT CHANGE UP (ref 13–44)
MCHC RBC-ENTMCNC: 29.2 PG — SIGNIFICANT CHANGE UP (ref 27–34)
MCHC RBC-ENTMCNC: 33.7 GM/DL — SIGNIFICANT CHANGE UP (ref 32–36)
MCV RBC AUTO: 86.7 FL — SIGNIFICANT CHANGE UP (ref 80–100)
MONOCYTES # BLD AUTO: 0.55 K/UL — SIGNIFICANT CHANGE UP (ref 0–0.9)
MONOCYTES NFR BLD AUTO: 8.8 % — SIGNIFICANT CHANGE UP (ref 2–14)
NEUTROPHILS # BLD AUTO: 3.21 K/UL — SIGNIFICANT CHANGE UP (ref 1.8–7.4)
NEUTROPHILS NFR BLD AUTO: 51.5 % — SIGNIFICANT CHANGE UP (ref 43–77)
NITRITE UR-MCNC: NEGATIVE — SIGNIFICANT CHANGE UP
PH UR: 7 — SIGNIFICANT CHANGE UP (ref 5–8)
PLATELET # BLD AUTO: 171 K/UL — SIGNIFICANT CHANGE UP (ref 150–400)
POTASSIUM SERPL-MCNC: 4 MMOL/L — SIGNIFICANT CHANGE UP (ref 3.5–5.3)
POTASSIUM SERPL-SCNC: 4 MMOL/L — SIGNIFICANT CHANGE UP (ref 3.5–5.3)
PROT UR-MCNC: NEGATIVE — SIGNIFICANT CHANGE UP
PROTHROM AB SERPL-ACNC: 14.6 SEC — HIGH (ref 10.5–13.4)
RBC # BLD: 4.52 M/UL — SIGNIFICANT CHANGE UP (ref 4.2–5.8)
RBC # FLD: 14.9 % — HIGH (ref 10.3–14.5)
RBC CASTS # UR COMP ASSIST: ABNORMAL /HPF (ref 0–4)
SODIUM SERPL-SCNC: 144 MMOL/L — SIGNIFICANT CHANGE UP (ref 135–145)
SP GR SPEC: 1.01 — SIGNIFICANT CHANGE UP (ref 1.01–1.02)
UROBILINOGEN FLD QL: NEGATIVE MG/DL — SIGNIFICANT CHANGE UP
WBC # BLD: 6.23 K/UL — SIGNIFICANT CHANGE UP (ref 3.8–10.5)
WBC # FLD AUTO: 6.23 K/UL — SIGNIFICANT CHANGE UP (ref 3.8–10.5)
WBC UR QL: SIGNIFICANT CHANGE UP /HPF (ref 0–5)

## 2023-05-17 PROCEDURE — G0463: CPT

## 2023-05-17 RX ORDER — ACETAMINOPHEN 500 MG
975 TABLET ORAL ONCE
Refills: 0 | Status: COMPLETED | OUTPATIENT
Start: 2023-05-26 | End: 2023-05-26

## 2023-05-17 RX ORDER — NIFEDIPINE 30 MG
1 TABLET, EXTENDED RELEASE 24 HR ORAL
Qty: 0 | Refills: 0 | DISCHARGE

## 2023-05-17 RX ORDER — DOCUSATE SODIUM 100 MG
1 CAPSULE ORAL
Qty: 0 | Refills: 0 | DISCHARGE

## 2023-05-17 RX ORDER — SODIUM CHLORIDE 9 MG/ML
3 INJECTION INTRAMUSCULAR; INTRAVENOUS; SUBCUTANEOUS ONCE
Refills: 0 | Status: DISCONTINUED | OUTPATIENT
Start: 2023-05-26 | End: 2023-05-26

## 2023-05-17 NOTE — H&P PST ADULT - CARDIOVASCULAR
normal/regular rate and rhythm/S1 S2 present/no gallops/no rub/no murmur details… regular rate and rhythm/S1 S2 present/no gallops/no rub/no murmur/pedal edema

## 2023-05-17 NOTE — H&P PST ADULT - EKG AND INTERPRETATION
nsr  60  t wave abnormality , consider lateral ischemia    faxed to DR. Linder reviewed from 12/2022 - NSR 61, t wave abnormality, consider lateral ischemia    Cardiac clearance pending

## 2023-05-17 NOTE — H&P PST ADULT - RESPIRATORY
normal/clear to auscultation bilaterally/no wheezes/no rales/no rhonchi normal/clear to auscultation bilaterally/no wheezes/no rales/no rhonchi/breath sounds equal/good air movement

## 2023-05-17 NOTE — H&P PST ADULT - ASSESSMENT
Patient educated on surgical scrub, preadmission instructions, medical clearance and day of procedure medications, verbalizes understanding. Pt instructed to stop vitamins/supplements/herbal medications/ASA/NSAIDS for one week prior to surgery and discuss with PMD.    CAPRINI SCORE    AGE RELATED RISK FACTORS                                                             [ ] Age 41-60 years                                            (1 Point)  [ ] Age: 61-74 years                                           (2 Points)                 [ ] Age= 75 years                                                (3 Points)             DISEASE RELATED RISK FACTORS                                                       [ ] Edema in the lower extremities                 (1 Point)                     [ ] Varicose veins                                               (1 Point)                                 [ ] BMI > 25 Kg/m2                                            (1 Point)                                  [ ] Serious infection (ie PNA)                            (1 Point)                     [ ] Lung disease ( COPD, Emphysema)            (1 Point)                                                                          [ ] Acute myocardial infarction                         (1 Point)                  [ ] Congestive heart failure (in the previous month)  (1 Point)         [ ] Inflammatory bowel disease                            (1 Point)                  [ ] Central venous access, PICC or Port               (2 points)       (within the last month)                                                                [ ] Stroke (in the previous month)                        (5 Points)    [ ] Previous or present malignancy                       (2 points)                                                                                                                                                         HEMATOLOGY RELATED FACTORS                                                         [ ] Prior episodes of VTE                                     (3 Points)                     [ ] Positive family history for VTE                      (3 Points)                  [ ] Prothrombin 72552 A                                     (3 Points)                     [ ] Factor V Leiden                                                (3 Points)                        [ ] Lupus anticoagulants                                      (3 Points)                                                           [ ] Anticardiolipin antibodies                              (3 Points)                                                       [ ] High homocysteine in the blood                   (3 Points)                                             [ ] Other congenital or acquired thrombophilia      (3 Points)                                                [ ] Heparin induced thrombocytopenia                  (3 Points)                                        MOBILITY RELATED FACTORS  [ ] Bed rest                                                         (1 Point)  [ ] Plaster cast                                                    (2 points)  [ ] Bed bound for more than 72 hours           (2 Points)    GENDER SPECIFIC FACTORS  [ ] Pregnancy or had a baby within the last month   (1 Point)  [ ] Post-partum < 6 weeks                                   (1 Point)  [ ] Hormonal therapy  or oral contraception   (1 Point)  [ ] History of pregnancy complications              (1 point)  [ ] Unexplained or recurrent              (1 Point)    OTHER RISK FACTORS                                           (1 Point)  [ ] BMI >40, smoking, diabetes requiring insulin, chemotherapy  blood transfusions and length of surgery over 2 hours    SURGERY RELATED RISK FACTORS  [ ]  Section within the last month     (1 Point)  [ ] Minor surgery                                                  (1 Point)  [ ] Arthroscopic surgery                                       (2 Points)  [ ] Planned major surgery lasting more            (2 Points)      than 45 minutes     [ ] Elective hip or knee joint replacement       (5 points)       surgery                                                TRAUMA RELATED RISK FACTORS  [ ] Fracture of the hip, pelvis, or leg                       (5 Points)  [ ] Spinal cord injury resulting in paralysis             (5 points)       (in the previous month)    [ ] Paralysis  (less than 1 month)                             (5 Points)  [ ] Multiple Trauma within 1 month                        (5 Points)    Total Score [        ]    Caprini Score 0-2: Low Risk, NO VTE prophylaxis required for most patients, encourage ambulation  Caprini Score 3-6: Moderate Risk , pharmacologic VTE prophylaxis is indicated for most patients (in the absence of contraindications)  Caprini Score Greater than or =7: High risk, pharmocologic VTE prophylaxis indicated for most patients (in the absence of contraindications)    OPIOID RISK TOOL    AURELIA EACH BOX THAT APPLIES AND ADD TOTALS AT THE END    FAMILY HISTORY OF SUBSTANCE ABUSE                 FEMALE         MALE                                                Alcohol                             [  ]1 pt          [  ]3pts                                               Illegal Durgs                     [  ]2 pts        [  ]3pts                                               Rx Drugs                           [  ]4 pts        [  ]4 pts    PERSONAL HISTORY OF SUBSTANCE ABUSE                                                                                          Alcohol                             [  ]3 pts       [  ]3 pts                                               Illegal Drugs                     [  ]4 pts        [  ]4 pts                                               Rx Drugs                           [  ]5 pts        [  ]5 pts    AGE BETWEEN 16-45 YEARS                                      [  ]1 pt         [  ]1 pt    HISTORY OF PREADOLESCENT   SEXUAL ABUSE                                                             [  ]3 pts        [  ]0pts    PSYCHOLOGICAL DISEASE                     ADD, OCD, Bipolar, Schizophrenia        [  ]2 pts         [  ]2 pts                      Depression                                               [  ]1 pt           [  ]1 pt           SCORING TOTAL   (add numbers and type here)              (***)                                     A score of 3 or lower indicated LOW risk for future opioid abuse  A score of 4 to 7 indicated moderate risk for future opioid abuse  A score of 8 or higher indicates a high risk for opioid abuse     Patient educated on surgical scrub, preadmission instructions, medical clearance, cardiac clearance and day of procedure medications, verbalizes understanding. Pt instructed to stop vitamins/supplements/herbal medications/ASA/NSAIDS for one week prior to surgery and discuss with PMD.    CAPRINI SCORE    AGE RELATED RISK FACTORS                                                             [ ] Age 41-60 years                                            (1 Point)  [ ] Age: 61-74 years                                           (2 Points)                 [ ] Age= 75 years                                                (3 Points)             DISEASE RELATED RISK FACTORS                                                       [ ] Edema in the lower extremities                 (1 Point)                     [ ] Varicose veins                                               (1 Point)                                 [ ] BMI > 25 Kg/m2                                            (1 Point)                                  [ ] Serious infection (ie PNA)                            (1 Point)                     [ ] Lung disease ( COPD, Emphysema)            (1 Point)                                                                          [ ] Acute myocardial infarction                         (1 Point)                  [ ] Congestive heart failure (in the previous month)  (1 Point)         [ ] Inflammatory bowel disease                            (1 Point)                  [ ] Central venous access, PICC or Port               (2 points)       (within the last month)                                                                [ ] Stroke (in the previous month)                        (5 Points)    [ ] Previous or present malignancy                       (2 points)                                                                                                                                                         HEMATOLOGY RELATED FACTORS                                                         [ ] Prior episodes of VTE                                     (3 Points)                     [ ] Positive family history for VTE                      (3 Points)                  [ ] Prothrombin 18655 A                                     (3 Points)                     [ ] Factor V Leiden                                                (3 Points)                        [ ] Lupus anticoagulants                                      (3 Points)                                                           [ ] Anticardiolipin antibodies                              (3 Points)                                                       [ ] High homocysteine in the blood                   (3 Points)                                             [ ] Other congenital or acquired thrombophilia      (3 Points)                                                [ ] Heparin induced thrombocytopenia                  (3 Points)                                        MOBILITY RELATED FACTORS  [ ] Bed rest                                                         (1 Point)  [ ] Plaster cast                                                    (2 points)  [ ] Bed bound for more than 72 hours           (2 Points)    GENDER SPECIFIC FACTORS  [ ] Pregnancy or had a baby within the last month   (1 Point)  [ ] Post-partum < 6 weeks                                   (1 Point)  [ ] Hormonal therapy  or oral contraception   (1 Point)  [ ] History of pregnancy complications              (1 point)  [ ] Unexplained or recurrent              (1 Point)    OTHER RISK FACTORS                                           (1 Point)  [ ] BMI >40, smoking, diabetes requiring insulin, chemotherapy  blood transfusions and length of surgery over 2 hours    SURGERY RELATED RISK FACTORS  [ ]  Section within the last month     (1 Point)  [ ] Minor surgery                                                  (1 Point)  [ ] Arthroscopic surgery                                       (2 Points)  [ ] Planned major surgery lasting more            (2 Points)      than 45 minutes     [ ] Elective hip or knee joint replacement       (5 points)       surgery                                                TRAUMA RELATED RISK FACTORS  [ ] Fracture of the hip, pelvis, or leg                       (5 Points)  [ ] Spinal cord injury resulting in paralysis             (5 points)       (in the previous month)    [ ] Paralysis  (less than 1 month)                             (5 Points)  [ ] Multiple Trauma within 1 month                        (5 Points)    Total Score [        ]    Caprini Score 0-2: Low Risk, NO VTE prophylaxis required for most patients, encourage ambulation  Caprini Score 3-6: Moderate Risk , pharmacologic VTE prophylaxis is indicated for most patients (in the absence of contraindications)  Caprini Score Greater than or =7: High risk, pharmocologic VTE prophylaxis indicated for most patients (in the absence of contraindications)    OPIOID RISK TOOL    AURELIA EACH BOX THAT APPLIES AND ADD TOTALS AT THE END    FAMILY HISTORY OF SUBSTANCE ABUSE                 FEMALE         MALE                                                Alcohol                             [  ]1 pt          [  ]3pts                                               Illegal Durgs                     [  ]2 pts        [  ]3pts                                               Rx Drugs                           [  ]4 pts        [  ]4 pts    PERSONAL HISTORY OF SUBSTANCE ABUSE                                                                                          Alcohol                             [  ]3 pts       [  ]3 pts                                               Illegal Drugs                     [  ]4 pts        [  ]4 pts                                               Rx Drugs                           [  ]5 pts        [  ]5 pts    AGE BETWEEN 16-45 YEARS                                      [  ]1 pt         [  ]1 pt    HISTORY OF PREADOLESCENT   SEXUAL ABUSE                                                             [  ]3 pts        [  ]0pts    PSYCHOLOGICAL DISEASE                     ADD, OCD, Bipolar, Schizophrenia        [  ]2 pts         [  ]2 pts                      Depression                                               [  ]1 pt           [  ]1 pt           SCORING TOTAL   (add numbers and type here)              (***)                                     A score of 3 or lower indicated LOW risk for future opioid abuse  A score of 4 to 7 indicated moderate risk for future opioid abuse  A score of 8 or higher indicates a high risk for opioid abuse   76 y/o male with PMH of HTN, HLD, DVT and gastric cancer presents to PST with complaints of needing his ureteral stent replaced. States due to his hx of gastric cancer he has had obstruction to left ureter. He currently has a stent in place, which was placed in 2022. Due to his gastric cancer he has been found to have left hydronephrosis. Denies pelvic, dysuria, urinary urgency or hematuria. Patient is scheduled for left exchange O stent on 23 with Dr. Cash. Patient educated on preadmission instructions, medical clearance, cardiac clearance and day of procedure medications, verbalizes understanding. Pt instructed to stop vitamins/supplements/herbal medications/ASA/NSAIDS for one week prior to surgery and discuss with PMD.    CAPRINI SCORE    AGE RELATED RISK FACTORS                                                             [ ] Age 41-60 years                                            (1 Point)  [ ] Age: 61-74 years                                           (2 Points)                 [x ] Age= 75 years                                                (3 Points)             DISEASE RELATED RISK FACTORS                                                       [x ] Edema in the lower extremities                 (1 Point)                     [ ] Varicose veins                                               (1 Point)                                 [ x] BMI > 25 Kg/m2                                            (1 Point)                                  [ ] Serious infection (ie PNA)                            (1 Point)                     [ ] Lung disease ( COPD, Emphysema)            (1 Point)                                                                          [ ] Acute myocardial infarction                         (1 Point)                  [ ] Congestive heart failure (in the previous month)  (1 Point)         [ ] Inflammatory bowel disease                            (1 Point)                  [ ] Central venous access, PICC or Port               (2 points)       (within the last month)                                                                [ ] Stroke (in the previous month)                        (5 Points)    [ x] Previous or present malignancy                       (2 points)                                                                                                                                                         HEMATOLOGY RELATED FACTORS                                                         [x ] Prior episodes of VTE                                     (3 Points)                     [ ] Positive family history for VTE                      (3 Points)                  [ ] Prothrombin 38513 A                                     (3 Points)                     [ ] Factor V Leiden                                                (3 Points)                        [ ] Lupus anticoagulants                                      (3 Points)                                                           [ ] Anticardiolipin antibodies                              (3 Points)                                                       [ ] High homocysteine in the blood                   (3 Points)                                             [ ] Other congenital or acquired thrombophilia      (3 Points)                                                [ ] Heparin induced thrombocytopenia                  (3 Points)                                        MOBILITY RELATED FACTORS  [ ] Bed rest                                                         (1 Point)  [ ] Plaster cast                                                    (2 points)  [ ] Bed bound for more than 72 hours           (2 Points)    GENDER SPECIFIC FACTORS  [ ] Pregnancy or had a baby within the last month   (1 Point)  [ ] Post-partum < 6 weeks                                   (1 Point)  [ ] Hormonal therapy  or oral contraception   (1 Point)  [ ] History of pregnancy complications              (1 point)  [ ] Unexplained or recurrent              (1 Point)    OTHER RISK FACTORS                                           (1 Point)  [ ] BMI >40, smoking, diabetes requiring insulin, chemotherapy  blood transfusions and length of surgery over 2 hours    SURGERY RELATED RISK FACTORS  [ ]  Section within the last month     (1 Point)  [x ] Minor surgery                                                  (1 Point)  [ ] Arthroscopic surgery                                       (2 Points)  [ ] Planned major surgery lasting more            (2 Points)      than 45 minutes     [ ] Elective hip or knee joint replacement       (5 points)       surgery                                                TRAUMA RELATED RISK FACTORS  [ ] Fracture of the hip, pelvis, or leg                       (5 Points)  [ ] Spinal cord injury resulting in paralysis             (5 points)       (in the previous month)    [ ] Paralysis  (less than 1 month)                             (5 Points)  [ ] Multiple Trauma within 1 month                        (5 Points)    Total Score [    11    ]    Caprini Score 0-2: Low Risk, NO VTE prophylaxis required for most patients, encourage ambulation  Caprini Score 3-6: Moderate Risk , pharmacologic VTE prophylaxis is indicated for most patients (in the absence of contraindications)  Caprini Score Greater than or =7: High risk, pharmocologic VTE prophylaxis indicated for most patients (in the absence of contraindications)    OPIOID RISK TOOL    AURELIA EACH BOX THAT APPLIES AND ADD TOTALS AT THE END    FAMILY HISTORY OF SUBSTANCE ABUSE                 FEMALE         MALE                                                Alcohol                             [  ]1 pt          [  ]3pts                                               Illegal Durgs                     [  ]2 pts        [  ]3pts                                               Rx Drugs                           [  ]4 pts        [  ]4 pts    PERSONAL HISTORY OF SUBSTANCE ABUSE                                                                                          Alcohol                             [  ]3 pts       [  ]3 pts                                               Illegal Drugs                     [  ]4 pts        [  ]4 pts                                               Rx Drugs                           [  ]5 pts        [  ]5 pts    AGE BETWEEN 16-45 YEARS                                      [  ]1 pt         [  ]1 pt    HISTORY OF PREADOLESCENT   SEXUAL ABUSE                                                             [  ]3 pts        [  ]0pts    PSYCHOLOGICAL DISEASE                     ADD, OCD, Bipolar, Schizophrenia        [  ]2 pts         [  ]2 pts                      Depression                                               [  ]1 pt           [  ]1 pt           SCORING TOTAL   (add numbers and type here)              (*0**)                                     A score of 3 or lower indicated LOW risk for future opioid abuse  A score of 4 to 7 indicated moderate risk for future opioid abuse  A score of 8 or higher indicates a high risk for opioid abuse

## 2023-05-17 NOTE — H&P PST ADULT - GASTROINTESTINAL
normal/soft/nontender/nondistended/normal active bowel sounds details… normal/soft/nontender/nondistended/normal active bowel sounds/no guarding/no rigidity/no organomegaly

## 2023-05-17 NOTE — H&P PST ADULT - PROBLEM SELECTOR PLAN 1
medical and cardiac clearance pending  Patient is scheduled for left exchange O stent on 5/26/23 with Dr. Cash.

## 2023-05-17 NOTE — H&P PST ADULT - HISTORY OF PRESENT ILLNESS
78 y/o male with PMH of       Patient is scheduled for left exchange O stent on 5/26/23 with Dr. Cash.  78 y/o male with PMH of HTN, HLD, DVT and gastric cancer presents to PST with complaints of needing his ureteral stent replaced. States due to his hx of gastric cancer he has had obstruction to left ureter. He currently has a stent in place, which was placed in 12/2022.     Denies pelvic, dysuria, urinary urgency or hematuria. Patient is scheduled for left exchange O stent on 5/26/23 with Dr. Cash.  76 y/o male with PMH of HTN, HLD, DVT and gastric cancer presents to PST with complaints of needing his ureteral stent replaced. States due to his hx of gastric cancer he has had obstruction to left ureter. He currently has a stent in place, which was placed in 12/2022. Due to his gastric cancer he has been found to have left hydronephrosis. Denies pelvic, dysuria, urinary urgency or hematuria. Patient is scheduled for left exchange O stent on 5/26/23 with Dr. Cash.

## 2023-05-17 NOTE — H&P PST ADULT - GASTROINTESTINAL COMMENTS
gastric cancer   treated with immunotherapy 2/ month Dr. Arellano diagnosed 2021 gastric cancer - treated with immunotherapy every 3 weeks month Dr. Arellano diagnosed 2021

## 2023-05-17 NOTE — H&P PST ADULT - GENITOURINARY COMMENTS
ureteral stent in place left ureteral stent in place hydronephrosis secondary to gastric cancer  stent changed  last in [place] 2022 left ureteral stent in place hydronephrosis secondary to gastric cancer  stent changed  last in 12/2022

## 2023-05-18 LAB
CULTURE RESULTS: SIGNIFICANT CHANGE UP
SPECIMEN SOURCE: SIGNIFICANT CHANGE UP

## 2023-05-25 ENCOUNTER — TRANSCRIPTION ENCOUNTER (OUTPATIENT)
Age: 78
End: 2023-05-25

## 2023-05-26 ENCOUNTER — TRANSCRIPTION ENCOUNTER (OUTPATIENT)
Age: 78
End: 2023-05-26

## 2023-05-26 ENCOUNTER — APPOINTMENT (OUTPATIENT)
Dept: UROLOGY | Facility: HOSPITAL | Age: 78
End: 2023-05-26

## 2023-05-26 ENCOUNTER — OUTPATIENT (OUTPATIENT)
Dept: INPATIENT UNIT | Facility: HOSPITAL | Age: 78
LOS: 1 days | End: 2023-05-26
Payer: MEDICARE

## 2023-05-26 VITALS
WEIGHT: 170.86 LBS | RESPIRATION RATE: 18 BRPM | TEMPERATURE: 99 F | HEART RATE: 60 BPM | DIASTOLIC BLOOD PRESSURE: 80 MMHG | HEIGHT: 67 IN | SYSTOLIC BLOOD PRESSURE: 154 MMHG | OXYGEN SATURATION: 100 %

## 2023-05-26 VITALS
TEMPERATURE: 98 F | OXYGEN SATURATION: 100 % | DIASTOLIC BLOOD PRESSURE: 89 MMHG | HEART RATE: 56 BPM | RESPIRATION RATE: 15 BRPM | SYSTOLIC BLOOD PRESSURE: 166 MMHG

## 2023-05-26 DIAGNOSIS — H26.9 UNSPECIFIED CATARACT: Chronic | ICD-10-CM

## 2023-05-26 DIAGNOSIS — Z95.828 PRESENCE OF OTHER VASCULAR IMPLANTS AND GRAFTS: Chronic | ICD-10-CM

## 2023-05-26 DIAGNOSIS — Z96.0 PRESENCE OF UROGENITAL IMPLANTS: Chronic | ICD-10-CM

## 2023-05-26 DIAGNOSIS — N13.5 CROSSING VESSEL AND STRICTURE OF URETER WITHOUT HYDRONEPHROSIS: ICD-10-CM

## 2023-05-26 LAB
APTT BLD: 28.3 SEC — SIGNIFICANT CHANGE UP (ref 27.5–35.5)
INR BLD: 1.12 RATIO — SIGNIFICANT CHANGE UP (ref 0.88–1.16)
PROTHROM AB SERPL-ACNC: 13 SEC — SIGNIFICANT CHANGE UP (ref 10.5–13.4)

## 2023-05-26 PROCEDURE — C1758: CPT

## 2023-05-26 PROCEDURE — 74420 UROGRAPHY RTRGR +-KUB: CPT | Mod: 26,LT

## 2023-05-26 PROCEDURE — 52332 CYSTOSCOPY AND TREATMENT: CPT | Mod: LT

## 2023-05-26 PROCEDURE — 85730 THROMBOPLASTIN TIME PARTIAL: CPT

## 2023-05-26 PROCEDURE — C1769: CPT

## 2023-05-26 PROCEDURE — 85610 PROTHROMBIN TIME: CPT

## 2023-05-26 PROCEDURE — 36415 COLL VENOUS BLD VENIPUNCTURE: CPT

## 2023-05-26 PROCEDURE — C2617: CPT

## 2023-05-26 DEVICE — GUIDEWIRE SENSOR DUAL-FLEX NITINOL STRAIGHT .035" X 150CM: Type: IMPLANTABLE DEVICE | Site: LEFT | Status: FUNCTIONAL

## 2023-05-26 DEVICE — URETERAL CATH OPEN END FLEXI-TIP 5FR .038" X 70CM: Type: IMPLANTABLE DEVICE | Site: LEFT | Status: FUNCTIONAL

## 2023-05-26 DEVICE — URETERAL STENT CONTOUR 7FR 26CM: Type: IMPLANTABLE DEVICE | Site: LEFT | Status: FUNCTIONAL

## 2023-05-26 RX ORDER — CEFAZOLIN SODIUM 1 G
2000 VIAL (EA) INJECTION ONCE
Refills: 0 | Status: DISCONTINUED | OUTPATIENT
Start: 2023-05-26 | End: 2023-05-26

## 2023-05-26 RX ORDER — GABAPENTIN 400 MG/1
1 CAPSULE ORAL
Refills: 0 | DISCHARGE

## 2023-05-26 RX ORDER — CEPHALEXIN 500 MG
1 CAPSULE ORAL
Qty: 6 | Refills: 0
Start: 2023-05-26 | End: 2023-05-28

## 2023-05-26 RX ADMIN — Medication 975 MILLIGRAM(S): at 15:40

## 2023-05-26 NOTE — ASU DISCHARGE PLAN (ADULT/PEDIATRIC) - MODE OF TRANSPORTATION
Wheelchair/Stroller

## 2023-05-26 NOTE — ASU DISCHARGE PLAN (ADULT/PEDIATRIC) - ASU DC SPECIAL INSTRUCTIONSFT
continue keflex antibiotics for 3 days.   Dr. Cash's office will reach out to you for your next appointment.   Please call your surgeon for any concerning symptoms , unable to urinate, fevers/chills, nausea/vomiting, chest pain, shortness of breath.

## 2023-05-26 NOTE — ASU DISCHARGE PLAN (ADULT/PEDIATRIC) - NS MD DC FALL RISK RISK
For information on Fall & Injury Prevention, visit: [email or web address] OR  [email or web address] OR  https://www.cdc.gov/steadi/patient.html

## 2023-07-05 ENCOUNTER — APPOINTMENT (OUTPATIENT)
Dept: UROLOGY | Facility: CLINIC | Age: 78
End: 2023-07-05

## 2023-07-12 ENCOUNTER — APPOINTMENT (OUTPATIENT)
Dept: UROLOGY | Facility: CLINIC | Age: 78
End: 2023-07-12
Payer: MEDICARE

## 2023-07-12 PROCEDURE — 99214 OFFICE O/P EST MOD 30 MIN: CPT

## 2023-07-14 NOTE — HISTORY OF PRESENT ILLNESS
[FreeTextEntry1] : was in the hospital a month ago\par had a fall and pneumonia\par now recovering, still in rehab \par \par see previous notes\par with metastatic disease, being treated with imunotherapy, improving\par with left hydronephrosis managed with a stent exchanged every 6 months\par last stent exchange 05/26/2023\par should have another exchange around 11/2023\par \par is planned to finish his immunotherapy treatment in 1-2 months\par will schedule another appointment (TTM) in September to review the results of the treatment\par assuming he is doing well, will plan for a ureteroscopy with possible removal of the stent around 11/2023\par \par discussed the possibility of removal of the stent\par will need confirmation from most recent CT/MRI of improvement in his disease and no risk of obstruction \par will then plan for a ureteroscopy with retrograde images before removing the stent\par discussed the procedure, risks and possible complications with the patient\par answered all questions\par Risks, benefits and alternatives discussed. Procedure, in hospital stay and recovery explained. Risk of infection, multiple procedures, ureteral injury, ureteral stricture, incomplete stone clearance, sepsis, bleeding, and retention, all discussed.\par  \par plan:\par - will schedule another appointment (TTM) in September

## 2023-09-13 ENCOUNTER — APPOINTMENT (OUTPATIENT)
Dept: UROLOGY | Facility: CLINIC | Age: 78
End: 2023-09-13
Payer: MEDICARE

## 2023-09-13 PROCEDURE — 99443: CPT | Mod: 95

## 2023-10-30 ENCOUNTER — OUTPATIENT (OUTPATIENT)
Dept: OUTPATIENT SERVICES | Facility: HOSPITAL | Age: 78
LOS: 1 days | End: 2023-10-30
Payer: MEDICARE

## 2023-10-30 VITALS
TEMPERATURE: 97 F | RESPIRATION RATE: 20 BRPM | HEART RATE: 56 BPM | OXYGEN SATURATION: 98 % | WEIGHT: 173.5 LBS | HEIGHT: 67 IN | SYSTOLIC BLOOD PRESSURE: 140 MMHG | DIASTOLIC BLOOD PRESSURE: 70 MMHG

## 2023-10-30 DIAGNOSIS — C16.9 MALIGNANT NEOPLASM OF STOMACH, UNSPECIFIED: ICD-10-CM

## 2023-10-30 DIAGNOSIS — H26.9 UNSPECIFIED CATARACT: Chronic | ICD-10-CM

## 2023-10-30 DIAGNOSIS — I82.409 ACUTE EMBOLISM AND THROMBOSIS OF UNSPECIFIED DEEP VEINS OF UNSPECIFIED LOWER EXTREMITY: ICD-10-CM

## 2023-10-30 DIAGNOSIS — Z96.0 PRESENCE OF UROGENITAL IMPLANTS: Chronic | ICD-10-CM

## 2023-10-30 DIAGNOSIS — Z01.818 ENCOUNTER FOR OTHER PREPROCEDURAL EXAMINATION: ICD-10-CM

## 2023-10-30 DIAGNOSIS — Z91.89 OTHER SPECIFIED PERSONAL RISK FACTORS, NOT ELSEWHERE CLASSIFIED: ICD-10-CM

## 2023-10-30 DIAGNOSIS — I10 ESSENTIAL (PRIMARY) HYPERTENSION: ICD-10-CM

## 2023-10-30 DIAGNOSIS — N13.5 CROSSING VESSEL AND STRICTURE OF URETER WITHOUT HYDRONEPHROSIS: ICD-10-CM

## 2023-10-30 DIAGNOSIS — Z29.9 ENCOUNTER FOR PROPHYLACTIC MEASURES, UNSPECIFIED: ICD-10-CM

## 2023-10-30 DIAGNOSIS — Z95.828 PRESENCE OF OTHER VASCULAR IMPLANTS AND GRAFTS: Chronic | ICD-10-CM

## 2023-10-30 DIAGNOSIS — N18.9 CHRONIC KIDNEY DISEASE, UNSPECIFIED: ICD-10-CM

## 2023-10-30 LAB
A1C WITH ESTIMATED AVERAGE GLUCOSE RESULT: 5.9 % — HIGH (ref 4–5.6)
A1C WITH ESTIMATED AVERAGE GLUCOSE RESULT: 5.9 % — HIGH (ref 4–5.6)
ANION GAP SERPL CALC-SCNC: 9 MMOL/L — SIGNIFICANT CHANGE UP (ref 5–17)
ANION GAP SERPL CALC-SCNC: 9 MMOL/L — SIGNIFICANT CHANGE UP (ref 5–17)
APPEARANCE UR: CLEAR — SIGNIFICANT CHANGE UP
APPEARANCE UR: CLEAR — SIGNIFICANT CHANGE UP
APTT BLD: 28.4 SEC — SIGNIFICANT CHANGE UP (ref 24.5–35.6)
APTT BLD: 28.4 SEC — SIGNIFICANT CHANGE UP (ref 24.5–35.6)
BACTERIA # UR AUTO: ABNORMAL
BACTERIA # UR AUTO: ABNORMAL
BASOPHILS # BLD AUTO: 0.02 K/UL — SIGNIFICANT CHANGE UP (ref 0–0.2)
BASOPHILS # BLD AUTO: 0.02 K/UL — SIGNIFICANT CHANGE UP (ref 0–0.2)
BASOPHILS NFR BLD AUTO: 0.3 % — SIGNIFICANT CHANGE UP (ref 0–2)
BASOPHILS NFR BLD AUTO: 0.3 % — SIGNIFICANT CHANGE UP (ref 0–2)
BILIRUB UR-MCNC: NEGATIVE — SIGNIFICANT CHANGE UP
BILIRUB UR-MCNC: NEGATIVE — SIGNIFICANT CHANGE UP
BUN SERPL-MCNC: 35.3 MG/DL — HIGH (ref 8–20)
BUN SERPL-MCNC: 35.3 MG/DL — HIGH (ref 8–20)
CALCIUM SERPL-MCNC: 10.1 MG/DL — SIGNIFICANT CHANGE UP (ref 8.4–10.5)
CALCIUM SERPL-MCNC: 10.1 MG/DL — SIGNIFICANT CHANGE UP (ref 8.4–10.5)
CHLORIDE SERPL-SCNC: 106 MMOL/L — SIGNIFICANT CHANGE UP (ref 96–108)
CHLORIDE SERPL-SCNC: 106 MMOL/L — SIGNIFICANT CHANGE UP (ref 96–108)
CO2 SERPL-SCNC: 28 MMOL/L — SIGNIFICANT CHANGE UP (ref 22–29)
CO2 SERPL-SCNC: 28 MMOL/L — SIGNIFICANT CHANGE UP (ref 22–29)
COLOR SPEC: YELLOW — SIGNIFICANT CHANGE UP
COLOR SPEC: YELLOW — SIGNIFICANT CHANGE UP
CREAT SERPL-MCNC: 2.41 MG/DL — HIGH (ref 0.5–1.3)
CREAT SERPL-MCNC: 2.41 MG/DL — HIGH (ref 0.5–1.3)
DIFF PNL FLD: ABNORMAL
DIFF PNL FLD: ABNORMAL
EGFR: 27 ML/MIN/1.73M2 — LOW
EGFR: 27 ML/MIN/1.73M2 — LOW
EOSINOPHIL # BLD AUTO: 0.09 K/UL — SIGNIFICANT CHANGE UP (ref 0–0.5)
EOSINOPHIL # BLD AUTO: 0.09 K/UL — SIGNIFICANT CHANGE UP (ref 0–0.5)
EOSINOPHIL NFR BLD AUTO: 1.2 % — SIGNIFICANT CHANGE UP (ref 0–6)
EOSINOPHIL NFR BLD AUTO: 1.2 % — SIGNIFICANT CHANGE UP (ref 0–6)
EPI CELLS # UR: NEGATIVE — SIGNIFICANT CHANGE UP
EPI CELLS # UR: NEGATIVE — SIGNIFICANT CHANGE UP
ESTIMATED AVERAGE GLUCOSE: 123 MG/DL — HIGH (ref 68–114)
ESTIMATED AVERAGE GLUCOSE: 123 MG/DL — HIGH (ref 68–114)
GLUCOSE SERPL-MCNC: 105 MG/DL — HIGH (ref 70–99)
GLUCOSE SERPL-MCNC: 105 MG/DL — HIGH (ref 70–99)
GLUCOSE UR QL: NEGATIVE MG/DL — SIGNIFICANT CHANGE UP
GLUCOSE UR QL: NEGATIVE MG/DL — SIGNIFICANT CHANGE UP
HCT VFR BLD CALC: 39.6 % — SIGNIFICANT CHANGE UP (ref 39–50)
HCT VFR BLD CALC: 39.6 % — SIGNIFICANT CHANGE UP (ref 39–50)
HGB BLD-MCNC: 13.6 G/DL — SIGNIFICANT CHANGE UP (ref 13–17)
HGB BLD-MCNC: 13.6 G/DL — SIGNIFICANT CHANGE UP (ref 13–17)
IMM GRANULOCYTES NFR BLD AUTO: 0.4 % — SIGNIFICANT CHANGE UP (ref 0–0.9)
IMM GRANULOCYTES NFR BLD AUTO: 0.4 % — SIGNIFICANT CHANGE UP (ref 0–0.9)
INR BLD: 1.04 RATIO — SIGNIFICANT CHANGE UP (ref 0.85–1.18)
INR BLD: 1.04 RATIO — SIGNIFICANT CHANGE UP (ref 0.85–1.18)
KETONES UR-MCNC: NEGATIVE — SIGNIFICANT CHANGE UP
KETONES UR-MCNC: NEGATIVE — SIGNIFICANT CHANGE UP
LEUKOCYTE ESTERASE UR-ACNC: ABNORMAL
LEUKOCYTE ESTERASE UR-ACNC: ABNORMAL
LYMPHOCYTES # BLD AUTO: 1.74 K/UL — SIGNIFICANT CHANGE UP (ref 1–3.3)
LYMPHOCYTES # BLD AUTO: 1.74 K/UL — SIGNIFICANT CHANGE UP (ref 1–3.3)
LYMPHOCYTES # BLD AUTO: 23.9 % — SIGNIFICANT CHANGE UP (ref 13–44)
LYMPHOCYTES # BLD AUTO: 23.9 % — SIGNIFICANT CHANGE UP (ref 13–44)
MCHC RBC-ENTMCNC: 29.7 PG — SIGNIFICANT CHANGE UP (ref 27–34)
MCHC RBC-ENTMCNC: 29.7 PG — SIGNIFICANT CHANGE UP (ref 27–34)
MCHC RBC-ENTMCNC: 34.3 GM/DL — SIGNIFICANT CHANGE UP (ref 32–36)
MCHC RBC-ENTMCNC: 34.3 GM/DL — SIGNIFICANT CHANGE UP (ref 32–36)
MCV RBC AUTO: 86.5 FL — SIGNIFICANT CHANGE UP (ref 80–100)
MCV RBC AUTO: 86.5 FL — SIGNIFICANT CHANGE UP (ref 80–100)
MONOCYTES # BLD AUTO: 0.67 K/UL — SIGNIFICANT CHANGE UP (ref 0–0.9)
MONOCYTES # BLD AUTO: 0.67 K/UL — SIGNIFICANT CHANGE UP (ref 0–0.9)
MONOCYTES NFR BLD AUTO: 9.2 % — SIGNIFICANT CHANGE UP (ref 2–14)
MONOCYTES NFR BLD AUTO: 9.2 % — SIGNIFICANT CHANGE UP (ref 2–14)
NEUTROPHILS # BLD AUTO: 4.72 K/UL — SIGNIFICANT CHANGE UP (ref 1.8–7.4)
NEUTROPHILS # BLD AUTO: 4.72 K/UL — SIGNIFICANT CHANGE UP (ref 1.8–7.4)
NEUTROPHILS NFR BLD AUTO: 65 % — SIGNIFICANT CHANGE UP (ref 43–77)
NEUTROPHILS NFR BLD AUTO: 65 % — SIGNIFICANT CHANGE UP (ref 43–77)
NITRITE UR-MCNC: NEGATIVE — SIGNIFICANT CHANGE UP
NITRITE UR-MCNC: NEGATIVE — SIGNIFICANT CHANGE UP
PH UR: 7 — SIGNIFICANT CHANGE UP (ref 5–8)
PH UR: 7 — SIGNIFICANT CHANGE UP (ref 5–8)
PLATELET # BLD AUTO: 176 K/UL — SIGNIFICANT CHANGE UP (ref 150–400)
PLATELET # BLD AUTO: 176 K/UL — SIGNIFICANT CHANGE UP (ref 150–400)
POTASSIUM SERPL-MCNC: 3.9 MMOL/L — SIGNIFICANT CHANGE UP (ref 3.5–5.3)
POTASSIUM SERPL-MCNC: 3.9 MMOL/L — SIGNIFICANT CHANGE UP (ref 3.5–5.3)
POTASSIUM SERPL-SCNC: 3.9 MMOL/L — SIGNIFICANT CHANGE UP (ref 3.5–5.3)
POTASSIUM SERPL-SCNC: 3.9 MMOL/L — SIGNIFICANT CHANGE UP (ref 3.5–5.3)
PROT UR-MCNC: 15
PROT UR-MCNC: 15
PROTHROM AB SERPL-ACNC: 11.5 SEC — SIGNIFICANT CHANGE UP (ref 9.5–13)
PROTHROM AB SERPL-ACNC: 11.5 SEC — SIGNIFICANT CHANGE UP (ref 9.5–13)
RBC # BLD: 4.58 M/UL — SIGNIFICANT CHANGE UP (ref 4.2–5.8)
RBC # BLD: 4.58 M/UL — SIGNIFICANT CHANGE UP (ref 4.2–5.8)
RBC # FLD: 15.6 % — HIGH (ref 10.3–14.5)
RBC # FLD: 15.6 % — HIGH (ref 10.3–14.5)
RBC CASTS # UR COMP ASSIST: ABNORMAL /HPF (ref 0–4)
RBC CASTS # UR COMP ASSIST: ABNORMAL /HPF (ref 0–4)
SODIUM SERPL-SCNC: 143 MMOL/L — SIGNIFICANT CHANGE UP (ref 135–145)
SODIUM SERPL-SCNC: 143 MMOL/L — SIGNIFICANT CHANGE UP (ref 135–145)
SP GR SPEC: 1 — LOW (ref 1.01–1.02)
SP GR SPEC: 1 — LOW (ref 1.01–1.02)
UROBILINOGEN FLD QL: NEGATIVE MG/DL — SIGNIFICANT CHANGE UP
UROBILINOGEN FLD QL: NEGATIVE MG/DL — SIGNIFICANT CHANGE UP
WBC # BLD: 7.27 K/UL — SIGNIFICANT CHANGE UP (ref 3.8–10.5)
WBC # BLD: 7.27 K/UL — SIGNIFICANT CHANGE UP (ref 3.8–10.5)
WBC # FLD AUTO: 7.27 K/UL — SIGNIFICANT CHANGE UP (ref 3.8–10.5)
WBC # FLD AUTO: 7.27 K/UL — SIGNIFICANT CHANGE UP (ref 3.8–10.5)
WBC UR QL: SIGNIFICANT CHANGE UP /HPF (ref 0–5)
WBC UR QL: SIGNIFICANT CHANGE UP /HPF (ref 0–5)

## 2023-10-30 PROCEDURE — 93005 ELECTROCARDIOGRAM TRACING: CPT

## 2023-10-30 PROCEDURE — G0463: CPT

## 2023-10-30 PROCEDURE — 93010 ELECTROCARDIOGRAM REPORT: CPT

## 2023-10-30 RX ORDER — FAMOTIDINE 10 MG/ML
1 INJECTION INTRAVENOUS
Refills: 0 | DISCHARGE

## 2023-10-30 RX ORDER — HYDROCHLOROTHIAZIDE 25 MG
1 TABLET ORAL
Refills: 0 | DISCHARGE

## 2023-10-30 NOTE — H&P PST ADULT - PROBLEM SELECTOR PLAN 6
Caprini Score 12 High risk,  Surgical team should assess /Strongly recommend pharmacological and mechanical measures for VTE prophylaxis

## 2023-10-30 NOTE — H&P PST ADULT - HISTORY OF PRESENT ILLNESS
76 y/o male with PMH of HTN, HLD, DVT and gastric cancer presents to PST with complaints of needing his ureteral stent replaced. States due to his hx of gastric cancer he has had obstruction to left ureter. He currently has a stent in place, which was placed in 12/2022. Due to his gastric cancer he has been found to have left hydronephrosis. Denies pelvic, dysuria, urinary urgency or hematuria. Patient is scheduled for left exchange O stent on 5/26/23 with Dr. Cash.  77 y/o male with PMH of HTN, HLD, CKDm DVT 2022 right leg, and gastric cancer s/p immunotherapy follows with Dr Eileen rubio/onc.  Patient presents to PST s/p urtereal stent placement 5/26/23 following obstruction/hydronephrosis due to gastric cancer. He denies fever, chills, flank pain, hematuria, dysuria.  Patient is scheduled for left ureteroscopy possible stent exchange on 11/17/23 with Dr Cash.  Medical and hematology evaluation pending 79 y/o male with PMH of HTN, HLD, adrenal insufficiency following with Rheumatology, CKD Stage 3B, DVT 9/2021 right leg, and gastric cancer s/p immunotherapy follows with Dr Eileen rubio/onc.  Patient presents to PST s/p ureteral stent placement 5/26/23 following obstruction/hydronephrosis due to gastric cancer. He denies fever, chills, flank pain, hematuria, dysuria.  Patient is scheduled for left ureteroscopy possible stent exchange on 11/17/23 with Dr Cash.  Medical and hematology evaluation pending  Rheumatology recommendations pending  Last cardiac note

## 2023-10-30 NOTE — H&P PST ADULT - ASSESSMENT
78 y/o male with PMH of HTN, HLD, DVT and gastric cancer presents to PST with complaints of needing his ureteral stent replaced. States due to his hx of gastric cancer he has had obstruction to left ureter. He currently has a stent in place, which was placed in 2022. Due to his gastric cancer he has been found to have left hydronephrosis. Denies pelvic, dysuria, urinary urgency or hematuria. Patient is scheduled for left exchange O stent on 23 with Dr. Cash. Patient educated on preadmission instructions, medical clearance, cardiac clearance and day of procedure medications, verbalizes understanding. Pt instructed to stop vitamins/supplements/herbal medications/ASA/NSAIDS for one week prior to surgery and discuss with PMD.    CAPRINI SCORE    AGE RELATED RISK FACTORS                                                             [ ] Age 41-60 years                                            (1 Point)  [ ] Age: 61-74 years                                           (2 Points)                 [x ] Age= 75 years                                                (3 Points)             DISEASE RELATED RISK FACTORS                                                       [x ] Edema in the lower extremities                 (1 Point)                     [ ] Varicose veins                                               (1 Point)                                 [ x] BMI > 25 Kg/m2                                            (1 Point)                                  [ ] Serious infection (ie PNA)                            (1 Point)                     [ ] Lung disease ( COPD, Emphysema)            (1 Point)                                                                          [ ] Acute myocardial infarction                         (1 Point)                  [ ] Congestive heart failure (in the previous month)  (1 Point)         [ ] Inflammatory bowel disease                            (1 Point)                  [ ] Central venous access, PICC or Port               (2 points)       (within the last month)                                                                [ ] Stroke (in the previous month)                        (5 Points)    [ x] Previous or present malignancy                       (2 points)                                                                                                                                                         HEMATOLOGY RELATED FACTORS                                                         [x ] Prior episodes of VTE                                     (3 Points)                     [ ] Positive family history for VTE                      (3 Points)                  [ ] Prothrombin 32995 A                                     (3 Points)                     [ ] Factor V Leiden                                                (3 Points)                        [ ] Lupus anticoagulants                                      (3 Points)                                                           [ ] Anticardiolipin antibodies                              (3 Points)                                                       [ ] High homocysteine in the blood                   (3 Points)                                             [ ] Other congenital or acquired thrombophilia      (3 Points)                                                [ ] Heparin induced thrombocytopenia                  (3 Points)                                        MOBILITY RELATED FACTORS  [ ] Bed rest                                                         (1 Point)  [ ] Plaster cast                                                    (2 points)  [ ] Bed bound for more than 72 hours           (2 Points)    GENDER SPECIFIC FACTORS  [ ] Pregnancy or had a baby within the last month   (1 Point)  [ ] Post-partum < 6 weeks                                   (1 Point)  [ ] Hormonal therapy  or oral contraception   (1 Point)  [ ] History of pregnancy complications              (1 point)  [ ] Unexplained or recurrent              (1 Point)    OTHER RISK FACTORS                                           (1 Point)  [ ] BMI >40, smoking, diabetes requiring insulin, chemotherapy  blood transfusions and length of surgery over 2 hours    SURGERY RELATED RISK FACTORS  [ ]  Section within the last month     (1 Point)  [x ] Minor surgery                                                  (1 Point)  [ ] Arthroscopic surgery                                       (2 Points)  [ ] Planned major surgery lasting more            (2 Points)      than 45 minutes     [ ] Elective hip or knee joint replacement       (5 points)       surgery                                                TRAUMA RELATED RISK FACTORS  [ ] Fracture of the hip, pelvis, or leg                       (5 Points)  [ ] Spinal cord injury resulting in paralysis             (5 points)       (in the previous month)    [ ] Paralysis  (less than 1 month)                             (5 Points)  [ ] Multiple Trauma within 1 month                        (5 Points)    Total Score [    11    ]    Caprini Score 0-2: Low Risk, NO VTE prophylaxis required for most patients, encourage ambulation  Caprini Score 3-6: Moderate Risk , pharmacologic VTE prophylaxis is indicated for most patients (in the absence of contraindications)  Caprini Score Greater than or =7: High risk, pharmocologic VTE prophylaxis indicated for most patients (in the absence of contraindications)    OPIOID RISK TOOL    AURELIA EACH BOX THAT APPLIES AND ADD TOTALS AT THE END    FAMILY HISTORY OF SUBSTANCE ABUSE                 FEMALE         MALE                                                Alcohol                             [  ]1 pt          [  ]3pts                                               Illegal Durgs                     [  ]2 pts        [  ]3pts                                               Rx Drugs                           [  ]4 pts        [  ]4 pts    PERSONAL HISTORY OF SUBSTANCE ABUSE                                                                                          Alcohol                             [  ]3 pts       [  ]3 pts                                               Illegal Drugs                     [  ]4 pts        [  ]4 pts                                               Rx Drugs                           [  ]5 pts        [  ]5 pts    AGE BETWEEN 16-45 YEARS                                      [  ]1 pt         [  ]1 pt    HISTORY OF PREADOLESCENT   SEXUAL ABUSE                                                             [  ]3 pts        [  ]0pts    PSYCHOLOGICAL DISEASE                     ADD, OCD, Bipolar, Schizophrenia        [  ]2 pts         [  ]2 pts                      Depression                                               [  ]1 pt           [  ]1 pt           SCORING TOTAL   (add numbers and type here)              (*0**)                                     A score of 3 or lower indicated LOW risk for future opioid abuse  A score of 4 to 7 indicated moderate risk for future opioid abuse  A score of 8 or higher indicates a high risk for opioid abuse   CAPRINI SCORE    AGE RELATED RISK FACTORS                                                             [ ] Age 41-60 years                                            (1 Point)  [ ] Age: 61-74 years                                           (2 Points)                 [x ] Age= 75 years                                                (3 Points)             DISEASE RELATED RISK FACTORS                                                       [x ] Edema in the lower extremities                 (1 Point)                     [ ] Varicose veins                                               (1 Point)                                 [ x] BMI > 25 Kg/m2                                            (1 Point)                                  [ ] Serious infection (ie PNA)                            (1 Point)                     [ ] Lung disease ( COPD, Emphysema)            (1 Point)                                                                          [ ] Acute myocardial infarction                         (1 Point)                  [ ] Congestive heart failure (in the previous month)  (1 Point)         [ ] Inflammatory bowel disease                            (1 Point)                  [ ] Central venous access, PICC or Port               (2 points)       (within the last month)                                                                [ ] Stroke (in the previous month)                        (5 Points)    [ x] Previous or present malignancy                       (2 points)                                                                                                                                                         HEMATOLOGY RELATED FACTORS                                                         [x ] Prior episodes of VTE                                     (3 Points)                     [ ] Positive family history for VTE                      (3 Points)                  [ ] Prothrombin 11120 A                                     (3 Points)                     [ ] Factor V Leiden                                                (3 Points)                        [ ] Lupus anticoagulants                                      (3 Points)                                                           [ ] Anticardiolipin antibodies                              (3 Points)                                                       [ ] High homocysteine in the blood                   (3 Points)                                             [ ] Other congenital or acquired thrombophilia      (3 Points)                                                [ ] Heparin induced thrombocytopenia                  (3 Points)                                        MOBILITY RELATED FACTORS  [ ] Bed rest                                                         (1 Point)  [ ] Plaster cast                                                    (2 points)  [ ] Bed bound for more than 72 hours           (2 Points)    GENDER SPECIFIC FACTORS  [ ] Pregnancy or had a baby within the last month   (1 Point)  [ ] Post-partum < 6 weeks                                   (1 Point)  [ ] Hormonal therapy  or oral contraception   (1 Point)  [ ] History of pregnancy complications              (1 point)  [ ] Unexplained or recurrent              (1 Point)    OTHER RISK FACTORS                                           (1 Point)  [ ] BMI >40, smoking, diabetes requiring insulin, chemotherapy  blood transfusions and length of surgery over 2 hours    SURGERY RELATED RISK FACTORS  [ ]  Section within the last month     (1 Point)  [x ] Minor surgery                                                  (1 Point)  [ ] Arthroscopic surgery                                       (2 Points)  [ ] Planned major surgery lasting more            (2 Points)      than 45 minutes     [ ] Elective hip or knee joint replacement       (5 points)       surgery                                                TRAUMA RELATED RISK FACTORS  [ ] Fracture of the hip, pelvis, or leg                       (5 Points)  [ ] Spinal cord injury resulting in paralysis             (5 points)       (in the previous month)    [ ] Paralysis  (less than 1 month)                             (5 Points)  [ ] Multiple Trauma within 1 month                        (5 Points)    Total Score [    11    ]    Caprini Score 0-2: Low Risk, NO VTE prophylaxis required for most patients, encourage ambulation  Caprini Score 3-6: Moderate Risk , pharmacologic VTE prophylaxis is indicated for most patients (in the absence of contraindications)  Caprini Score Greater than or =7: High risk, pharmocologic VTE prophylaxis indicated for most patients (in the absence of contraindications)    OPIOID RISK TOOL    AURELIA EACH BOX THAT APPLIES AND ADD TOTALS AT THE END    FAMILY HISTORY OF SUBSTANCE ABUSE                 FEMALE         MALE                                                Alcohol                             [  ]1 pt          [  ]3pts                                               Illegal Durgs                     [  ]2 pts        [  ]3pts                                               Rx Drugs                           [  ]4 pts        [  ]4 pts    PERSONAL HISTORY OF SUBSTANCE ABUSE                                                                                          Alcohol                             [  ]3 pts       [  ]3 pts                                               Illegal Drugs                     [  ]4 pts        [  ]4 pts                                               Rx Drugs                           [  ]5 pts        [  ]5 pts    AGE BETWEEN 16-45 YEARS                                      [  ]1 pt         [  ]1 pt    HISTORY OF PREADOLESCENT   SEXUAL ABUSE                                                             [  ]3 pts        [  ]0pts    PSYCHOLOGICAL DISEASE                     ADD, OCD, Bipolar, Schizophrenia        [  ]2 pts         [  ]2 pts                      Depression                                               [  ]1 pt           [  ]1 pt           SCORING TOTAL   (add numbers and type here)              (*0**)                                     A score of 3 or lower indicated LOW risk for future opioid abuse  A score of 4 to 7 indicated moderate risk for future opioid abuse  A score of 8 or higher indicates a high risk for opioid abuse   CAPRINI SCORE    AGE RELATED RISK FACTORS                                                             [ ] Age 41-60 years                                            (1 Point)  [ ] Age: 61-74 years                                           (2 Points)                 [x ] Age= 75 years                                                (3 Points)             DISEASE RELATED RISK FACTORS                                                       [x ] Edema in the lower extremities                 (1 Point)                     [ ] Varicose veins                                               (1 Point)                                 [ x] BMI > 25 Kg/m2                                            (1 Point)                                  [ ] Serious infection (ie PNA)                            (1 Point)                     [ ] Lung disease ( COPD, Emphysema)            (1 Point)                                                                          [ ] Acute myocardial infarction                         (1 Point)                  [ ] Congestive heart failure (in the previous month)  (1 Point)         [ ] Inflammatory bowel disease                            (1 Point)                  [ ] Central venous access, PICC or Port               (2 points)       (within the last month)                                                                [ ] Stroke (in the previous month)                        (5 Points)    [ x] Previous or present malignancy                       (2 points)                                                                                                                                                         HEMATOLOGY RELATED FACTORS                                                         [x ] Prior episodes of VTE                                     (3 Points)                     [ ] Positive family history for VTE                      (3 Points)                  [ ] Prothrombin 57306 A                                     (3 Points)                     [ ] Factor V Leiden                                                (3 Points)                        [ ] Lupus anticoagulants                                      (3 Points)                                                           [ ] Anticardiolipin antibodies                              (3 Points)                                                       [ ] High homocysteine in the blood                   (3 Points)                                             [ ] Other congenital or acquired thrombophilia      (3 Points)                                                [ ] Heparin induced thrombocytopenia                  (3 Points)                                        MOBILITY RELATED FACTORS  [ ] Bed rest                                                         (1 Point)  [ ] Plaster cast                                                    (2 points)  [ ] Bed bound for more than 72 hours           (2 Points)    GENDER SPECIFIC FACTORS  [ ] Pregnancy or had a baby within the last month   (1 Point)  [ ] Post-partum < 6 weeks                                   (1 Point)  [ ] Hormonal therapy  or oral contraception   (1 Point)  [ ] History of pregnancy complications              (1 point)  [ ] Unexplained or recurrent              (1 Point)    OTHER RISK FACTORS                                           (1 Point)  [ ] BMI >40, smoking, diabetes requiring insulin, chemotherapy  blood transfusions and length of surgery over 2 hours    SURGERY RELATED RISK FACTORS  [ ]  Section within the last month     (1 Point)  [ ] Minor surgery                                                  (1 Point)  [ ] Arthroscopic surgery                                       (2 Points)  [ x] Planned major surgery lasting more            (2 Points)      than 45 minutes     [ ] Elective hip or knee joint replacement       (5 points)       surgery                                                TRAUMA RELATED RISK FACTORS  [ ] Fracture of the hip, pelvis, or leg                       (5 Points)  [ ] Spinal cord injury resulting in paralysis             (5 points)       (in the previous month)    [ ] Paralysis  (less than 1 month)                             (5 Points)  [ ] Multiple Trauma within 1 month                        (5 Points)    Total Score [    12    ]    Caprini Score 0-2: Low Risk, NO VTE prophylaxis required for most patients, encourage ambulation  Caprini Score 3-6: Moderate Risk , pharmacologic VTE prophylaxis is indicated for most patients (in the absence of contraindications)  Caprini Score Greater than or =7: High risk, pharmocologic VTE prophylaxis indicated for most patients (in the absence of contraindications)    OPIOID RISK TOOL    AURELIA EACH BOX THAT APPLIES AND ADD TOTALS AT THE END    FAMILY HISTORY OF SUBSTANCE ABUSE                 FEMALE         MALE                                                Alcohol                             [  ]1 pt          [  ]3pts                                               Illegal Durgs                     [  ]2 pts        [  ]3pts                                               Rx Drugs                           [  ]4 pts        [  ]4 pts    PERSONAL HISTORY OF SUBSTANCE ABUSE                                                                                          Alcohol                             [  ]3 pts       [  ]3 pts                                               Illegal Drugs                     [  ]4 pts        [  ]4 pts                                               Rx Drugs                           [  ]5 pts        [  ]5 pts    AGE BETWEEN 16-45 YEARS                                      [  ]1 pt         [  ]1 pt    HISTORY OF PREADOLESCENT   SEXUAL ABUSE                                                             [  ]3 pts        [  ]0pts    PSYCHOLOGICAL DISEASE                     ADD, OCD, Bipolar, Schizophrenia        [  ]2 pts         [  ]2 pts                      Depression                                               [  ]1 pt           [  ]1 pt           SCORING TOTAL   (add numbers and type here)              (*0**)                                     A score of 3 or lower indicated LOW risk for future opioid abuse  A score of 4 to 7 indicated moderate risk for future opioid abuse  A score of 8 or higher indicates a high risk for opioid abuse    77 y/o male with PMH of HTN, HLD, adrenal insufficiency following with Rheumatology, CKD Stage 3B, DVT 2021 right leg, and gastric cancer s/p immunotherapy follows with Dr Arellano heme/onc.  Patient presents to PST s/p ureteral stent placement 23 following obstruction/hydronephrosis due to gastric cancer. He denies fever, chills, flank pain, hematuria, dysuria.  Patient is scheduled for left ureteroscopy possible stent exchange on 23 with Dr Cash. Patient educated on surgical scrub, NPO after MN, preadmission instructions, medical, heme/onc clearance, rheumatology recommendations for hydrocortisone and day of procedure medications, verbalizes understanding. Pt instructed to stop vitamins/supplements/herbal medications/ASA/NSAIDS for one week prior to surgery and discuss with PMD.

## 2023-10-30 NOTE — H&P PST ADULT - MS GEN HX ROS MEA POS PC
back pain

## 2023-10-30 NOTE — H&P PST ADULT - EKG AND INTERPRETATION
Sinus bradycardia ST & T wave abnormality, consider inferior ischemia  ST & T wave abnormality consider anterolateral ischemia 56 Sinus bradycardia ST & T wave abnormality, consider inferior ischemia  ST & T wave abnormality consider anterolateral ischemia 56    no changes when compared to previous EKGS

## 2023-10-30 NOTE — H&P PST ADULT - GENITOURINARY COMMENTS
left ureteral stent in place hydronephrosis secondary to gastric cancer  stent changed  last in 12/2022 left ureteral stent in place hydronephrosis secondary to gastric cancer  stent changed  last in 5/2023

## 2023-10-30 NOTE — H&P PST ADULT - MUSCULOSKELETAL
details… negative ROM intact/no joint swelling/no joint erythema/no calf tenderness/normal gait/strength 5/5 bilateral upper extremities/strength 5/5 bilateral lower extremities ROM intact/no joint swelling/no joint erythema/no calf tenderness/normal gait/strength 5/5 bilateral upper extremities/strength 5/5 bilateral lower extremities/back exam

## 2023-10-30 NOTE — H&P PST ADULT - OTHER CARE PROVIDERS
Dr. Chaudhry [phone]  PCP / Dr. Linder [phone] cardiology Dr. Chaudhry [phone]  PCP / Dr. Linder [phone] cardiology, Dr Arellano [phone] Dr. Chaudhry [phone]  PCP / Dr. Linder [phone] cardiology, Dr Arellano [phone]

## 2023-10-30 NOTE — H&P PST ADULT - CARDIOVASCULAR
details… negative regular rate and rhythm/S1 S2 present/no gallops/no rub/no JVD/normal PMI/no pedal edema/murmur

## 2023-10-30 NOTE — H&P PST ADULT - PROBLEM SELECTOR PLAN 4
Intermediate Risk, Stop Bang Score 3, MARICARMEN precautions Has appt with Dr Arellano this week, heme/onc pending   On eliquis 25mg BID, last procedure stopped for 48 hours, pt advised to confirm instructions for this procedure, verbalized understanding

## 2023-10-30 NOTE — H&P PST ADULT - NEGATIVE GENERAL GENITOURINARY SYMPTOMS
no hematuria/no renal colic/no flank pain L/no flank pain R/no dysuria/normal urinary frequency no hematuria/no renal colic/no flank pain L/no flank pain R/no bladder infections/no dysuria/normal urinary frequency

## 2023-10-30 NOTE — H&P PST ADULT - GASTROINTESTINAL
details… negative soft/nontender/nondistended/normal active bowel sounds/no guarding/no rigidity/no organomegaly

## 2023-10-30 NOTE — H&P PST ADULT - PROBLEM SELECTOR PLAN 2
Caprini Score 11, at high risk, surgical team to order appropriate VTE prophylaxis CKD Stage 3B, BMP pending, medical evaluation pending

## 2023-10-31 LAB
CULTURE RESULTS: SIGNIFICANT CHANGE UP
CULTURE RESULTS: SIGNIFICANT CHANGE UP
SPECIMEN SOURCE: SIGNIFICANT CHANGE UP
SPECIMEN SOURCE: SIGNIFICANT CHANGE UP

## 2023-11-16 ENCOUNTER — TRANSCRIPTION ENCOUNTER (OUTPATIENT)
Age: 78
End: 2023-11-16

## 2023-11-17 ENCOUNTER — APPOINTMENT (OUTPATIENT)
Dept: UROLOGY | Facility: HOSPITAL | Age: 78
End: 2023-11-17

## 2023-11-17 ENCOUNTER — TRANSCRIPTION ENCOUNTER (OUTPATIENT)
Age: 78
End: 2023-11-17

## 2023-11-17 ENCOUNTER — OUTPATIENT (OUTPATIENT)
Dept: OUTPATIENT SERVICES | Facility: HOSPITAL | Age: 78
LOS: 1 days | End: 2023-11-17
Payer: MEDICARE

## 2023-11-17 VITALS
DIASTOLIC BLOOD PRESSURE: 82 MMHG | WEIGHT: 173.5 LBS | OXYGEN SATURATION: 99 % | RESPIRATION RATE: 16 BRPM | SYSTOLIC BLOOD PRESSURE: 170 MMHG | HEART RATE: 59 BPM | TEMPERATURE: 98 F | HEIGHT: 67 IN

## 2023-11-17 VITALS
OXYGEN SATURATION: 100 % | SYSTOLIC BLOOD PRESSURE: 141 MMHG | TEMPERATURE: 98 F | RESPIRATION RATE: 12 BRPM | HEART RATE: 60 BPM | DIASTOLIC BLOOD PRESSURE: 71 MMHG

## 2023-11-17 DIAGNOSIS — Z95.828 PRESENCE OF OTHER VASCULAR IMPLANTS AND GRAFTS: Chronic | ICD-10-CM

## 2023-11-17 DIAGNOSIS — N13.5 CROSSING VESSEL AND STRICTURE OF URETER WITHOUT HYDRONEPHROSIS: ICD-10-CM

## 2023-11-17 DIAGNOSIS — Z96.0 PRESENCE OF UROGENITAL IMPLANTS: Chronic | ICD-10-CM

## 2023-11-17 PROCEDURE — C1758: CPT

## 2023-11-17 PROCEDURE — 76000 FLUOROSCOPY <1 HR PHYS/QHP: CPT

## 2023-11-17 PROCEDURE — 52005 CYSTO W/URTRL CATHJ: CPT | Mod: LT

## 2023-11-17 PROCEDURE — C1769: CPT

## 2023-11-17 PROCEDURE — 52351 CYSTOURETERO & OR PYELOSCOPE: CPT | Mod: LT

## 2023-11-17 PROCEDURE — 74420 UROGRAPHY RTRGR +-KUB: CPT | Mod: 26,LT

## 2023-11-17 DEVICE — URETERAL CATH DUAL LUMEN 10FR 54CM: Type: IMPLANTABLE DEVICE | Site: LEFT | Status: FUNCTIONAL

## 2023-11-17 DEVICE — URETERAL CATH FLEXIMA OPEN END 5FR 70CM: Type: IMPLANTABLE DEVICE | Site: LEFT | Status: FUNCTIONAL

## 2023-11-17 DEVICE — GUIDEWIRE SENSOR DUAL-FLEX NITINOL STRAIGHT .035" X 150CM: Type: IMPLANTABLE DEVICE | Site: LEFT | Status: FUNCTIONAL

## 2023-11-17 RX ORDER — ACETAMINOPHEN 500 MG
975 TABLET ORAL ONCE
Refills: 0 | Status: COMPLETED | OUTPATIENT
Start: 2023-11-17 | End: 2023-11-17

## 2023-11-17 RX ORDER — CEFAZOLIN SODIUM 1 G
2000 VIAL (EA) INJECTION ONCE
Refills: 0 | Status: DISCONTINUED | OUTPATIENT
Start: 2023-11-17 | End: 2023-11-17

## 2023-11-17 RX ORDER — APIXABAN 2.5 MG/1
1 TABLET, FILM COATED ORAL
Qty: 0 | Refills: 0 | DISCHARGE

## 2023-11-17 RX ORDER — HYDROCORTISONE 20 MG
1 TABLET ORAL
Refills: 0 | DISCHARGE

## 2023-11-17 RX ORDER — HYDROCORTISONE 20 MG
50 TABLET ORAL ONCE
Refills: 0 | Status: DISCONTINUED | OUTPATIENT
Start: 2023-11-17 | End: 2023-11-17

## 2023-11-17 RX ORDER — METOPROLOL TARTRATE 50 MG
1 TABLET ORAL
Refills: 0 | DISCHARGE

## 2023-11-17 RX ORDER — CEPHALEXIN 500 MG
1 CAPSULE ORAL
Qty: 6 | Refills: 0
Start: 2023-11-17 | End: 2023-11-19

## 2023-11-17 RX ORDER — HYDRALAZINE HCL 50 MG
1 TABLET ORAL
Refills: 0 | DISCHARGE

## 2023-11-17 RX ORDER — POLYETHYLENE GLYCOL 3350 17 G/17G
17 POWDER, FOR SOLUTION ORAL
Refills: 0 | DISCHARGE

## 2023-11-17 RX ORDER — SENNA PLUS 8.6 MG/1
1 TABLET ORAL
Refills: 0 | DISCHARGE

## 2023-11-17 RX ORDER — LATANOPROST 0.05 MG/ML
1 SOLUTION/ DROPS OPHTHALMIC; TOPICAL
Qty: 0 | Refills: 0 | DISCHARGE

## 2023-11-17 RX ADMIN — Medication 975 MILLIGRAM(S): at 08:50

## 2023-11-17 NOTE — ASU DISCHARGE PLAN (ADULT/PEDIATRIC) - CARE PROVIDER_API CALL
Cisco Cash  Urology  [address], [address]  [place] [address]  Phone: [phone]  Fax: [phone]  Established Patient  Follow Up Time:

## 2023-11-17 NOTE — PROGRESS NOTE ADULT - ASSESSMENT
Chart reviewed, and patient had confusion in regards to Endocrinology (Dr. Debora Baum) instructions for post op care. Patient properly took a double dose of his 20 mg (40 total) of hydrocortisone this morning. And will receive 50 mg of IV hydrocortisone prior to induction. In terms of post op care, the note states he needs a 25 mg hydrocortisone taper Q 8 hours for 24 hours, after he can taper to double the dose daily and resume his regular dose for 24-48 hours after. Patient was unaware of this and brought to Dr. Cash's attention. States he will properly find out dosages prior to surgery. I instructed his wife to call the endocrinologist for more specific instructions as they were not sure what to take after. Everyone in agreement with plan.

## 2023-11-17 NOTE — ASU PREOP CHECKLIST - BP NONINVASIVE DIASTOLIC (MM HG)
Patient brought in by parents reports nausea, vomiting and diarrhea today. As per Mom was in [facility] 2 nights ago for wheezing/ congestion and sent home with albuterol. Rectal temp taken @ 98.5F.
82

## 2023-11-17 NOTE — ASU DISCHARGE PLAN (ADULT/PEDIATRIC) - ASU DC SPECIAL INSTRUCTIONSFT
GENERAL: It is common to have blood in your urine after your procedure. It may be pink or even red; inform your doctor if you have a significant amount of clot in the urine or if you are unable to void at all. The urine may clear and then become bloody again especially as you are more physically active.  BATHING: You may shower or bathe.  DIET: You may resume your regular diet and regular medication regimen.  PAIN: You may take Tylenol (acetaminophen) 650-975mg and/or Motrin (ibuprofen) 400-600mg, both available over the counter, for pain every 6 hours as needed. Do not exceed 4000mg of Tylenol (acetaminophen) daily. You may alternate these medications such that you take one or the other every 3 hours for around the clock pain coverage.   ANTIBIOTICS: You were given a prescription for an antibiotic, please take this medication as instructed and be sure to complete the entire course.  STOOL SOFTENERS: Do not allow yourself to become constipated as straining may cause bleeding. Take stool softeners or a laxative (ex. Miralax, Colace, Senokot, ExLax, etc), available over the counter, if needed.    HOME STEROID MEDICATION:  -TODAY (11/17/23, for 24 hours): Take hydrocortisone 25 mg every 8 hours for 24 hours  -TOMORROW (11/18/23): Take hydrocortisone 40 mg in the morning and 20 mg in the evening  -DAY AFTER TOMORROW (11/19/23): Take hydrocortisone as you normally do--2 mg in the morning and 10 mg in the afternoon.    ACTIVITY: No heavy lifting or strenuous exercise until you are evaluated at your post-operative appointment. Otherwise, you may return to your usual level of physical activity.  FOLLOW-UP: If you did not already schedule your post-operative appointment, please call your urologist to schedule and follow-up appointment.  CALL YOUR UROLOGIST IF: You have any bleeding that does not stop, inability to void >8 hours, fever over 101 F, chills, persistent nausea/vomiting, changes in your incision concerning for infection, or if your pain is not controlled on your discharge pain medications.

## 2023-11-17 NOTE — BRIEF OPERATIVE NOTE - OPERATION/FINDINGS
No left kidney stones/ureteral stones or ureteral stricture No left kidney stones/ureteral stones or ureteral stricture  stent removed

## 2023-11-17 NOTE — BRIEF OPERATIVE NOTE - NSICDXBRIEFPROCEDURE_GEN_ALL_CORE_FT
PROCEDURES:  Removal, stent, ureter, cystoscopic 17-Nov-2023 10:46:48  Maraina Lawler  Cystoscopy with left ureteroscopy 17-Nov-2023 10:46:59  Mariana Lawler  Retrograde pyelogram 17-Nov-2023 10:47:09  Mariana Lawler

## 2023-12-08 ENCOUNTER — APPOINTMENT (OUTPATIENT)
Dept: UROLOGY | Facility: CLINIC | Age: 78
End: 2023-12-08
Payer: MEDICARE

## 2023-12-08 PROCEDURE — 99214 OFFICE O/P EST MOD 30 MIN: CPT

## 2023-12-17 NOTE — ASU DISCHARGE PLAN (ADULT/PEDIATRIC) - DISCHARGE PLAN IS COMPLETE AND GIVEN TO PATIENT
: Yes
CONSTITUTIONAL: Well-appearing;  in no apparent distress.   EYES: PERRL; EOM intact.   ENT: Dry lips and tongue  CARDIOVASCULAR: Normal S1, S2; no murmurs, rubs, or gallops.   RESPIRATORY: Normal chest excursion with respiration; breath sounds clear and equal bilaterally; no wheezes, rhonchi, or rales.  GI/: Normal bowel sounds; non-distended; non-tender; no palpable organomegaly.   MS: No calf swelling and tenderness.  SKIN: Normal for age and race; warm; dry; good turgor; no apparent lesions or exudate.   NEURO/PSYCH: A&O X 4. CN II-XII grossly intact. no drifting. sensation and strength equal to b/l upper and lower extremities. speaking coherently. nml cerebellum test. ambulate with nml and steady gait

## 2023-12-18 ENCOUNTER — APPOINTMENT (OUTPATIENT)
Dept: ULTRASOUND IMAGING | Facility: CLINIC | Age: 78
End: 2023-12-18

## 2023-12-27 ENCOUNTER — APPOINTMENT (OUTPATIENT)
Dept: UROLOGY | Facility: CLINIC | Age: 78
End: 2023-12-27
Payer: MEDICARE

## 2023-12-27 PROCEDURE — 99442: CPT | Mod: 95

## 2023-12-27 NOTE — HISTORY OF PRESENT ILLNESS
[Home] : at home, [unfilled] , at the time of the visit. [Medical Office: ([facility])___] : at the medical office located in  [Verbal consent obtained from patient] : the patient, [unfilled] [FreeTextEntry1] : 79 yo M see previous notes metastatic gastric cancer - on immunotherapy at [facility] had let side obstruction - treated with chronic stent  last CT - still with stent in place ureteroscopy and retrograde images 11/17/2023 - with no obstruction stent was removed  patient waiting for ultrasound and renal scan - still no images feeling well, no pain confirmed with the patient he is scheduled for the tests will schedule a new visit in 2-3 weeks to discuss

## 2024-01-16 ENCOUNTER — OUTPATIENT (OUTPATIENT)
Dept: OUTPATIENT SERVICES | Facility: HOSPITAL | Age: 79
LOS: 1 days | End: 2024-01-16
Payer: MEDICARE

## 2024-01-16 ENCOUNTER — APPOINTMENT (OUTPATIENT)
Dept: ULTRASOUND IMAGING | Facility: CLINIC | Age: 79
End: 2024-01-16
Payer: MEDICARE

## 2024-01-16 DIAGNOSIS — N13.5 CROSSING VESSEL AND STRICTURE OF URETER WITHOUT HYDRONEPHROSIS: ICD-10-CM

## 2024-01-16 DIAGNOSIS — Z95.828 PRESENCE OF OTHER VASCULAR IMPLANTS AND GRAFTS: Chronic | ICD-10-CM

## 2024-01-16 DIAGNOSIS — Z96.0 PRESENCE OF UROGENITAL IMPLANTS: Chronic | ICD-10-CM

## 2024-01-16 DIAGNOSIS — H26.9 UNSPECIFIED CATARACT: Chronic | ICD-10-CM

## 2024-01-16 PROCEDURE — 76770 US EXAM ABDO BACK WALL COMP: CPT

## 2024-01-16 PROCEDURE — 76770 US EXAM ABDO BACK WALL COMP: CPT | Mod: 26

## 2024-01-17 ENCOUNTER — OUTPATIENT (OUTPATIENT)
Dept: OUTPATIENT SERVICES | Facility: HOSPITAL | Age: 79
LOS: 1 days | End: 2024-01-17
Payer: MEDICARE

## 2024-01-17 ENCOUNTER — APPOINTMENT (OUTPATIENT)
Dept: NUCLEAR MEDICINE | Facility: HOSPITAL | Age: 79
End: 2024-01-17
Payer: MEDICARE

## 2024-01-17 DIAGNOSIS — Z95.828 PRESENCE OF OTHER VASCULAR IMPLANTS AND GRAFTS: Chronic | ICD-10-CM

## 2024-01-17 DIAGNOSIS — N13.5 CROSSING VESSEL AND STRICTURE OF URETER WITHOUT HYDRONEPHROSIS: ICD-10-CM

## 2024-01-17 DIAGNOSIS — Z96.0 PRESENCE OF UROGENITAL IMPLANTS: Chronic | ICD-10-CM

## 2024-01-17 DIAGNOSIS — H26.9 UNSPECIFIED CATARACT: Chronic | ICD-10-CM

## 2024-01-17 PROCEDURE — 78708 K FLOW/FUNCT IMAGE W/DRUG: CPT | Mod: 26,MH

## 2024-01-17 PROCEDURE — A9562: CPT

## 2024-01-17 PROCEDURE — 78708 K FLOW/FUNCT IMAGE W/DRUG: CPT

## 2024-02-09 ENCOUNTER — APPOINTMENT (OUTPATIENT)
Dept: UROLOGY | Facility: CLINIC | Age: 79
End: 2024-02-09
Payer: MEDICARE

## 2024-02-09 DIAGNOSIS — N20.0 CALCULUS OF KIDNEY: ICD-10-CM

## 2024-02-09 DIAGNOSIS — C79.9 SECONDARY MALIGNANT NEOPLASM OF UNSPECIFIED SITE: ICD-10-CM

## 2024-02-09 DIAGNOSIS — N13.5 CROSSING VESSEL AND STRICTURE OF URETER W/OUT HYDRONEPHROSIS: ICD-10-CM

## 2024-02-09 DIAGNOSIS — C16.9 SECONDARY MALIGNANT NEOPLASM OF UNSPECIFIED SITE: ICD-10-CM

## 2024-02-09 DIAGNOSIS — N28.1 CYST OF KIDNEY, ACQUIRED: ICD-10-CM

## 2024-02-09 PROCEDURE — 99443: CPT | Mod: 93

## 2024-02-13 PROBLEM — N13.5 URETERAL OBSTRUCTION, LEFT: Status: ACTIVE | Noted: 2021-07-30

## 2024-02-13 PROBLEM — N28.1 RENAL CYST: Status: ACTIVE | Noted: 2024-02-13

## 2024-02-13 PROBLEM — C79.9 METASTASIS FROM GASTRIC CANCER: Status: ACTIVE | Noted: 2021-06-11

## 2024-02-13 NOTE — HISTORY OF PRESENT ILLNESS
[Home] : at home, [unfilled] , at the time of the visit. [Medical Office: ([facility])___] : at the medical office located in  [Verbal consent obtained from patient] : the patient, [unfilled] [FreeTextEntry1] : 77 yo M for follow up  metastatic gastric cancer had left ureteral obstruction requiring ureteral stent and routine exchanges last ureteroscopy with no signs of obstruction and stent was removed patient is feeling well  reviewed NM 1/17/2024: equal function, no obstruction reviewed ultrasound 1/16/202: right side with known stable 9 mm stone, left side with stable 4 cm complex cyst  discussed with the patient, no need for intervention now no obstruction at this point and no need for drainage the complex cyst is stable and will only follow the stone on the right is also stable, and will only follow  plan: - follow up in 4-6 months with new ultrasound

## 2024-04-26 NOTE — H&P PST ADULT - DOES THIS PATIENT HAVE A HISTORY OF OR HAS BEEN DX WITH HEART FAILURE?
Ry Sandhu is a 74 y.o. male on day 22 of admission presenting with Pneumonia.    Subjective   Afebrile and stable overnight with no acute events. POD#3 of right AKA and PEG placement. No complaints this morning, still in mittens to prevent pulling of lines/tubes    Active Medications  aspirin, 81 mg, oral, Daily  atorvastatin, 40 mg, nasogastric tube, Daily  enoxaparin, 40 mg, subcutaneous, q24h  ergocalciferol, 200 mcg, nasogastric tube, Daily  folic acid, 1 mg, nasogastric tube, Daily  melatonin, 5 mg, nasogastric tube, Daily  [START ON 4/27/2024] pantoprazole, 40 mg, oral, Daily before breakfast    PRN medications: acetaminophen, dextrose, dextrose, glucagon, glucagon, HYDROmorphone, ipratropium-albuteroL, loperamide, oxyCODONE, oxygen, polyethylene glycol, silver nitrate applicators                  Objective     Physical Exam  Constitutional:       Appearance: Normal appearance.   HENT:      Head: Normocephalic and atraumatic.      Mouth/Throat:      Mouth: Mucous membranes are dry.      Pharynx: No posterior oropharyngeal erythema.   Eyes:      Extraocular Movements: Extraocular movements intact.      Pupils: Pupils are equal, round, and reactive to light.   Cardiovascular:      Rate and Rhythm: Normal rate and regular rhythm.      Pulses: Normal pulses.      Heart sounds: Normal heart sounds.   Pulmonary:      Comments: Slightly increased WOB, diminished air entry in the bases  Abdominal:      General: Abdomen is flat. There is no distension.      Palpations: Abdomen is soft.      Tenderness: There is no abdominal tenderness.      Comments: PEG in place. Dressing is c/d/i   Musculoskeletal:      Right lower leg: No edema.      Left lower leg: No edema.      Comments: S/p bilateral BKA. Dressings are c/d/i   Skin:     Findings: Bruising present.   Neurological:      General: No focal deficit present.      Mental Status: He is alert.      Comments: Aox2, some deficits in attention         Last Recorded  "Vitals  Blood pressure 120/65, pulse 100, temperature 36.6 °C (97.9 °F), temperature source Temporal, resp. rate 18, height 1.88 m (6' 2\"), weight 48.7 kg (107 lb 5.8 oz), SpO2 92%.  Intake/Output last 3 Shifts:  I/O last 3 completed shifts:  In: 3250 (66.7 mL/kg) [P.O.:20; NG/GT:3230]  Out: - (0 mL/kg)   Weight: 48.7 kg     Relevant Results  Results from last 7 days   Lab Units 04/26/24  0915   WBC AUTO x10*3/uL 7.8   HEMOGLOBIN g/dL 8.5*   HEMATOCRIT % 24.4*   PLATELETS AUTO x10*3/uL 357      Results from last 7 days   Lab Units 04/25/24  1211   SODIUM mmol/L 148*   POTASSIUM mmol/L 3.4*   CHLORIDE mmol/L 110*   CO2 mmol/L 30   BUN mg/dL 33*   CREATININE mg/dL 0.49*   GLUCOSE mg/dL 132*   CALCIUM mg/dL 7.8*          XR chest 1 view    Result Date: 4/16/2024    1. Interval improvement in hazy right-sided opacification, with progression to multifocal patchy airspace opacities and associated costophrenic angle blunting. Persistent silhouetting of the right hemidiaphragm and costophrenic angle. Interval improvement of silhouetting of the right heart border. Findings likely represent a combination of effusion and associated consolidation. 2. Similar hazy left basilar opacities, which may represent a infectious/inflammatory process. 3. Coarse background of interstitial markings, which can be seen in the setting of chronic lung disease.     XR chest 1 view    Result Date: 4/13/2024     1. Interval complete opacification of the right hemithorax. Correlate with mucous plugging and right lung collapse. 2. Patchy airspace opacity in the left mid and lower lung. Correlate with concern for infection and aspiration. 3. Component of interstitial pulmonary prominence and edema.       US right upper quadrant    Result Date: 4/11/2024    1. Diffusely echogenic liver parenchyma, similar to prior exam, consistent with hepatic steatosis. 2. Partially visualized right-sided pleural effusion.      CT head wo IV contrast    Result Date: " 4/10/2024     No acute intracranial abnormality. Chronic intracranial findings as above.       CT chest wo IV contrast    Result Date: 4/6/2024      1.  Interlobular septal thickening with extensive ground-glass and consolidative opacities throughout bilateral lungs with more coarse opacities in the posterior aspect of the right upper and lower lobes. There are moderate sized bilateral pleural effusions. Findings are nonspecific and may represent atypical infection, pulmonary hemorrhage, or pulmonary edema. Acute respiratory distress syndrome may have a similar appearance. 2. Severe coronary artery calcifications. Recommend correlation with coronary artery disease risk factors. 3. Moderate emphysematous changes.     US right upper quadrant    Result Date: 4/5/2024    1. Diffusely echogenic liver parenchyma consistent with steatosis. 2. Thickened/edematous gallbladder wall with no evidence of hyperemia, gallbladder wall distention or cholelithiasis. Findings are equivocal and most likely due to fluid overload.     CT head wo IV contrast    Result Date: 4/5/2024    No evidence of acute intracranial abnormality. If concerns for an acute stroke may consider brain MRI for further evaluation.      XR chest 1 view    Result Date: 4/5/2024    1. Perihilar and predominantly central airspace opacity in bilateral lungs. Findings may be due to severe edema with infection or aspiration not excluded. 2. Bilateral pleural effusion, left more than right. 3. Nonobstructive bowel gas pattern. 4. Medical devices as above.                      Assessment/Plan   Patient is a 74-year-old male with a history of HTN, HLD, COPD, CAD, PAD s/p s/p b/l iliofemoral endarterectomy and bovine patch angioplasty (8/21/23) and L CFA/SFA arterectomy and SFA, YISEL/EIA stent (05/2020) who initially presented with bilateral dry gangrene and colitis. Course complicated by acute hypoxic respiratory failure and septic shock requiring vasopressors and  intubation. Patient s/p right BKA and left AKA. Now extubated and off pressors, planning for revision of amputations.    Update 4/26:  -Dressing change today by Vascular surgery  -Pending SNF placement    #Bilateral dry gangrene  #Peripheral vascular disease  #Coronary artery disease  ::Most recent cath report in 8/23: EF of 40%, moderate coronary artery disease with significant stenosis  ::Extensive history of vascular disease with b/l iliofemoral endarterectomy and angioplasty, arterectomies, stents  ::Taken emergently for right BKA, left AKA on 4/5  -POD#3 of right AKA, dressing change today  -Oxycodone 5mg q4h PRN, Dilaudid 0.4mg PRN for pain  -Atorvastatin 40mg  -Aspirin 81mg daily  -Vascular surgery to follow outpatient    #Acute hypoxic respiratory failure  #Community acquired pneumonia  #Pleural effusion  #Mucus plugging  #Atelectasis  ::Growing Corynebacterium striatum  ::Abx history: Azithromycin 500 mg daily (4/1 - 4/4), Ceftriaxone (3/31 - 4/5), Doxycycline (4/4 - 4/5), Flagyl (3/25 - 4/5), Vancomycin/Zosyn (4/5-4/12, 4/13-4/15)   ::S/p two thoracentesis  -Aggressive bronchopulmonary hygiene    #Acute encephalopathy  #Delirium  #Dysphagia  ::Likely septic in etiology, improved  ::Vitamin B12 and folate WNL, TSH 7.61 from fT4 WNL  -Delirium precautions  -Melatonin 5mg at bedtime  -Tylenol 650mg q6h PRN, Oxy 5mg q4h PRN, Dilaudid 0.4mg for breakthrough pain  -Failed barium swallow, s/p PEG placement  -PEG tube placed with bolus feeds    #Hypernatremia  ::Na of 150 at peak  ::Unclear etiology, possibly insensible losses from tachypnea, wounds  -Titrated free water flushes to 300cc q6h, can titrate further as needed    #Hepatic steatosis  ::Have been increasing for the past few days, liver enzymes have fluctuated during this hospital course  ::RUQ U/S on 4/10 showed hepatic steatosis with no evidence of biliary dilation  -Continue to monitor    #COPD  ::No PFT on file  -DuoNeb q6h PRN  -Titrate oxygen to  88-92%    #Acute on chronic blood loss anemia  ::Oozing for post-operattive wounds  ::Required 1u PRBC  -Active T&S  -No signs of coagulopathy    Code status: DNR, ok for elective intubation  DVT ppx: Lovenox  GI ppx: Protonix  Oxygen: 2LNC  Access: PIV, external urinary catheter, Dobhoff  NOK: Pura Sandhu (wife) [phone]     Dispo: Discharge to rehab after post-op monitoring period         Wayne Rosen MD  PGY-1 Internal Medicine    unknown

## 2024-09-04 ENCOUNTER — APPOINTMENT (OUTPATIENT)
Dept: UROLOGY | Facility: CLINIC | Age: 79
End: 2024-09-04
Payer: MEDICARE

## 2024-09-04 VITALS
BODY MASS INDEX: 27.31 KG/M2 | DIASTOLIC BLOOD PRESSURE: 78 MMHG | HEIGHT: 67 IN | WEIGHT: 174 LBS | SYSTOLIC BLOOD PRESSURE: 192 MMHG

## 2024-09-04 DIAGNOSIS — C16.9 SECONDARY MALIGNANT NEOPLASM OF UNSPECIFIED SITE: ICD-10-CM

## 2024-09-04 DIAGNOSIS — C79.9 SECONDARY MALIGNANT NEOPLASM OF UNSPECIFIED SITE: ICD-10-CM

## 2024-09-04 DIAGNOSIS — N28.1 CYST OF KIDNEY, ACQUIRED: ICD-10-CM

## 2024-09-04 PROCEDURE — 99214 OFFICE O/P EST MOD 30 MIN: CPT

## 2024-09-04 RX ORDER — LOSARTAN POTASSIUM 50 MG/1
50 TABLET, FILM COATED ORAL
Refills: 0 | Status: ACTIVE | COMMUNITY
Start: 2024-09-04

## 2024-09-04 RX ORDER — METOPROLOL SUCCINATE 25 MG/1
25 TABLET, EXTENDED RELEASE ORAL
Refills: 0 | Status: ACTIVE | COMMUNITY
Start: 2024-09-04

## 2024-10-01 ENCOUNTER — APPOINTMENT (OUTPATIENT)
Dept: MRI IMAGING | Facility: CLINIC | Age: 79
End: 2024-10-01
Payer: MEDICARE

## 2024-10-01 ENCOUNTER — OUTPATIENT (OUTPATIENT)
Dept: OUTPATIENT SERVICES | Facility: HOSPITAL | Age: 79
LOS: 1 days | End: 2024-10-01
Payer: MEDICARE

## 2024-10-01 DIAGNOSIS — Z95.828 PRESENCE OF OTHER VASCULAR IMPLANTS AND GRAFTS: Chronic | ICD-10-CM

## 2024-10-01 DIAGNOSIS — H26.9 UNSPECIFIED CATARACT: Chronic | ICD-10-CM

## 2024-10-01 DIAGNOSIS — N28.1 CYST OF KIDNEY, ACQUIRED: ICD-10-CM

## 2024-10-01 DIAGNOSIS — Z96.0 PRESENCE OF UROGENITAL IMPLANTS: Chronic | ICD-10-CM

## 2024-10-01 PROCEDURE — 74181 MRI ABDOMEN W/O CONTRAST: CPT | Mod: 26,MH

## 2024-11-20 PROCEDURE — 74181 MRI ABDOMEN W/O CONTRAST: CPT

## 2025-01-03 NOTE — ASU DISCHARGE PLAN (ADULT/PEDIATRIC) - CARE PROVIDER_API CALL
MCG/ACT nasal spray 2 sprays by Nasal route daily    Probiotic Product (PROBIOTIC PO) Take by mouth    Fexofenadine HCl (ALLEGRA PO) Take by mouth daily    albuterol sulfate HFA (VENTOLIN HFA) 108 (90 Base) MCG/ACT inhaler Inhale 2 puffs into the lungs 4 times daily as needed for Wheezing    ibuprofen (ADVIL;MOTRIN) 600 MG tablet Take 1 tablet by mouth    dicyclomine (BENTYL) 10 MG capsule TAKE 1 CAPSULE BY MOUTH FOUR TIMES A DAY AS NEEDED    levonorgestrel (MIRENA, 52 MG,) IUD 52 mg 1 each by IntraUTERine route once    azithromycin (ZITHROMAX) 250 MG tablet Take 1 tablet by mouth daily (Patient not taking: Reported on 4/9/2024)     No current facility-administered medications for this visit.       Allergies   Allergen Reactions    Amoxicillin Hives     Denies allergy, had allergy test 6/25/24    Sulfa Antibiotics Hives       Past Medical History, Past Surgical History, Family history, Social History, and Medications were all reviewed with the patient today and updated as necessary. Where available I have reviewed outside charts in epic and I have referenced care everywhere where possible.     Compliant with medication: Yes   Side effects from medications:  No           No data to display                   EXAMINATION  Musculoskeletal    GAIT AND STATION   [] WNL   [] RESTRICTED   [] UNSTEADY WALK        [] ABNORMAL   [] UNBALANCED  [] WHEELCHAIR/  WALKER/CANE     PSYCHIATRIC    PSYCHOMOTOR   [x]  WNL   [] RETARDATION   [] AGITATION            GENERAL APPEARANCE:   [x]  WELL GROOMED []     DISHEVELED   []  UNKEMPT      []  UNUSUAL/BIZZARE    [] WNL       ATTITUDE:   [x] COOPERATIVE   [] GUARDED   [] SUSPICIOUS      [] HOSTILE                            BEHAVIOR:   [x] CALM   [] HYPERACTIVE   [] MANNERISMS      [] BIZZARE         SPEECH:   [x] NORMAL FOR CLIENT   [] SPONTANEOUS   [] SLURRED   [] WHISPERING      [] LOUD   [] PRESSURED   [] ARTICULATE        EYE CONTACT:   [x] WNL   [] BLANK STARE   [] INTENSE      [] 
Cisco Cash)  Urology  [address], [address]  [place] [address]  Phone: [phone]  Fax: [phone]  Follow Up Time:

## 2025-01-27 ENCOUNTER — APPOINTMENT (OUTPATIENT)
Dept: ULTRASOUND IMAGING | Facility: CLINIC | Age: 80
End: 2025-01-27
Payer: MEDICARE

## 2025-01-27 ENCOUNTER — OUTPATIENT (OUTPATIENT)
Dept: OUTPATIENT SERVICES | Facility: HOSPITAL | Age: 80
LOS: 1 days | End: 2025-01-27
Payer: MEDICARE

## 2025-01-27 DIAGNOSIS — N20.0 CALCULUS OF KIDNEY: ICD-10-CM

## 2025-01-27 DIAGNOSIS — H26.9 UNSPECIFIED CATARACT: Chronic | ICD-10-CM

## 2025-01-27 DIAGNOSIS — Z96.0 PRESENCE OF UROGENITAL IMPLANTS: Chronic | ICD-10-CM

## 2025-01-27 DIAGNOSIS — Z95.828 PRESENCE OF OTHER VASCULAR IMPLANTS AND GRAFTS: Chronic | ICD-10-CM

## 2025-01-27 PROCEDURE — 76770 US EXAM ABDO BACK WALL COMP: CPT | Mod: 26

## 2025-01-27 PROCEDURE — 76770 US EXAM ABDO BACK WALL COMP: CPT

## 2025-02-14 ENCOUNTER — APPOINTMENT (OUTPATIENT)
Dept: UROLOGY | Facility: CLINIC | Age: 80
End: 2025-02-14
Payer: MEDICARE

## 2025-02-14 DIAGNOSIS — N20.0 CALCULUS OF KIDNEY: ICD-10-CM

## 2025-02-14 DIAGNOSIS — C16.9 SECONDARY MALIGNANT NEOPLASM OF UNSPECIFIED SITE: ICD-10-CM

## 2025-02-14 DIAGNOSIS — N28.1 CYST OF KIDNEY, ACQUIRED: ICD-10-CM

## 2025-02-14 DIAGNOSIS — C79.9 SECONDARY MALIGNANT NEOPLASM OF UNSPECIFIED SITE: ICD-10-CM

## 2025-02-14 PROCEDURE — 99214 OFFICE O/P EST MOD 30 MIN: CPT

## 2025-03-05 ENCOUNTER — APPOINTMENT (OUTPATIENT)
Dept: CT IMAGING | Facility: CLINIC | Age: 80
End: 2025-03-05

## 2025-03-05 ENCOUNTER — OUTPATIENT (OUTPATIENT)
Dept: OUTPATIENT SERVICES | Facility: HOSPITAL | Age: 80
LOS: 1 days | End: 2025-03-05
Payer: MEDICARE

## 2025-03-05 DIAGNOSIS — H26.9 UNSPECIFIED CATARACT: Chronic | ICD-10-CM

## 2025-03-05 DIAGNOSIS — Z95.828 PRESENCE OF OTHER VASCULAR IMPLANTS AND GRAFTS: Chronic | ICD-10-CM

## 2025-03-05 DIAGNOSIS — N20.0 CALCULUS OF KIDNEY: ICD-10-CM

## 2025-03-05 DIAGNOSIS — Z96.0 PRESENCE OF UROGENITAL IMPLANTS: Chronic | ICD-10-CM

## 2025-03-05 PROCEDURE — 74176 CT ABD & PELVIS W/O CONTRAST: CPT

## 2025-03-05 PROCEDURE — 74176 CT ABD & PELVIS W/O CONTRAST: CPT | Mod: 26

## 2025-03-14 ENCOUNTER — APPOINTMENT (OUTPATIENT)
Dept: UROLOGY | Facility: CLINIC | Age: 80
End: 2025-03-14

## 2025-04-14 ENCOUNTER — APPOINTMENT (OUTPATIENT)
Dept: UROLOGY | Facility: CLINIC | Age: 80
End: 2025-04-14

## 2025-04-14 DIAGNOSIS — N20.0 CALCULUS OF KIDNEY: ICD-10-CM

## 2025-04-14 DIAGNOSIS — Q61.3 POLYCYSTIC KIDNEY, UNSPECIFIED: ICD-10-CM

## 2025-04-14 DIAGNOSIS — C79.9 SECONDARY MALIGNANT NEOPLASM OF UNSPECIFIED SITE: ICD-10-CM

## 2025-04-14 DIAGNOSIS — C16.9 SECONDARY MALIGNANT NEOPLASM OF UNSPECIFIED SITE: ICD-10-CM

## 2025-04-14 PROCEDURE — 99215 OFFICE O/P EST HI 40 MIN: CPT

## 2025-07-31 NOTE — BRIEF OPERATIVE NOTE - NSICDXBRIEFPREOP_GEN_ALL_CORE_FT
oriented to person, place and time, normal sensation, cooperative with exam PRE-OP DIAGNOSIS:  Ureteral obstruction 27-Jan-2022 08:05:17  Cisco Cash

## (undated) DEVICE — Device

## (undated) DEVICE — PREP BETADINE SPONGE STICKS

## (undated) DEVICE — OMNIPAQUE 300  30ML

## (undated) DEVICE — VALVE ENDO SURESEAL II 0-5FR

## (undated) DEVICE — SOL IRR BAG NS 0.9% 3000ML

## (undated) DEVICE — GOWN TRIMAX LG

## (undated) DEVICE — VISITEC 4X4

## (undated) DEVICE — MEDICATION LABELS W MARKER

## (undated) DEVICE — GLV 7.5 PROTEXIS (WHITE)

## (undated) DEVICE — GLV 7.5 PROTEXIS

## (undated) DEVICE — TUBING IRR SET FOR CYSTOSCOPY 77"

## (undated) DEVICE — TUBING TUR 2 PRONG

## (undated) DEVICE — DRAPE C ARM UNIVERSAL

## (undated) DEVICE — ADAPTER URETHRAL CATH CONNECTING

## (undated) DEVICE — SOL IRR BAG H2O 3000ML

## (undated) DEVICE — SYR LUER LOK 10CC

## (undated) DEVICE — PRESSURE INFUSOR BAG 3000ML

## (undated) DEVICE — WARMING BLANKET UPPER ADULT

## (undated) DEVICE — DRAPE GYN SURG LINGEMAN

## (undated) DEVICE — SPECIMEN CONTAINER 100ML

## (undated) DEVICE — TUBING SUCTION 20FT

## (undated) DEVICE — WRAP COMPRESSION CALF MED

## (undated) DEVICE — IRR BULB PATHFINDER + 10"

## (undated) DEVICE — VENODYNE/SCD SLEEVE CALF MEDIUM

## (undated) DEVICE — WRAP COMPRESSION CALF LG

## (undated) DEVICE — PACK CYSTOSCOPY TIBURON

## (undated) DEVICE — FOLEY TRAY 16FR LF URINE METER SURESTEP

## (undated) DEVICE — CONTAINER SPECIMEN 100ML

## (undated) DEVICE — SPONGE RAYTEC 4X4 16PLY

## (undated) DEVICE — BLANKET WARMER UPPER ADULT

## (undated) DEVICE — PORT BIOPSY

## (undated) DEVICE — PACK CYSTOSCOPY TIBURON 10/CS